# Patient Record
Sex: MALE | Race: OTHER | Employment: OTHER | URBAN - METROPOLITAN AREA
[De-identification: names, ages, dates, MRNs, and addresses within clinical notes are randomized per-mention and may not be internally consistent; named-entity substitution may affect disease eponyms.]

---

## 2017-01-16 ENCOUNTER — OFFICE VISIT (OUTPATIENT)
Dept: INTERNAL MEDICINE CLINIC | Age: 77
End: 2017-01-16

## 2017-01-16 VITALS
TEMPERATURE: 97.7 F | HEART RATE: 67 BPM | RESPIRATION RATE: 20 BRPM | HEIGHT: 72 IN | WEIGHT: 194 LBS | DIASTOLIC BLOOD PRESSURE: 56 MMHG | SYSTOLIC BLOOD PRESSURE: 98 MMHG | BODY MASS INDEX: 26.28 KG/M2 | OXYGEN SATURATION: 95 %

## 2017-01-16 DIAGNOSIS — D22.9 NUMEROUS MOLES: ICD-10-CM

## 2017-01-16 DIAGNOSIS — F41.9 ANXIETY: ICD-10-CM

## 2017-01-16 DIAGNOSIS — C64.9 RENAL CELL ADENOCARCINOMA, UNSPECIFIED LATERALITY (HCC): ICD-10-CM

## 2017-01-16 DIAGNOSIS — C61 PROSTATE CANCER (HCC): ICD-10-CM

## 2017-01-16 DIAGNOSIS — L71.9 ROSACEA: ICD-10-CM

## 2017-01-16 DIAGNOSIS — Z95.5 STENTED CORONARY ARTERY: ICD-10-CM

## 2017-01-16 DIAGNOSIS — Z90.5 H/O PARTIAL NEPHRECTOMY: ICD-10-CM

## 2017-01-16 DIAGNOSIS — I25.10 CORONARY ARTERY DISEASE INVOLVING NATIVE HEART WITHOUT ANGINA PECTORIS, UNSPECIFIED VESSEL OR LESION TYPE: Primary | ICD-10-CM

## 2017-01-16 RX ORDER — METRONIDAZOLE 10 MG/G
GEL TOPICAL DAILY
Qty: 45 G | Refills: 0 | Status: SHIPPED | OUTPATIENT
Start: 2017-01-16 | End: 2017-08-03 | Stop reason: ALTCHOICE

## 2017-01-16 RX ORDER — LORAZEPAM 1 MG/1
TABLET ORAL
Qty: 30 TAB | Refills: 1 | Status: SHIPPED | OUTPATIENT
Start: 2017-01-16 | End: 2017-11-07 | Stop reason: SDUPTHER

## 2017-01-16 NOTE — MR AVS SNAPSHOT
Visit Information Date & Time Provider Department Dept. Phone Encounter #  
 1/16/2017  1:15 PM [facility] Internal Medicine [phone] [id number] Follow-up Instructions Return in about 6 months (around 7/16/2017). Upcoming Health Maintenance Date Due DTaP/Tdap/Td series (1 - Tdap) 2/1/1962 ZOSTER VACCINE AGE 60> 2/1/2001 GLAUCOMA SCREENING Q2Y 2/1/2006 Pneumococcal 65+ High/Highest Risk (1 of 2 - PCV13) 2/1/2006 MEDICARE YEARLY EXAM 2/1/2006 INFLUENZA AGE 9 TO ADULT 8/1/2016 Allergies as of 1/16/2017  Review Complete On: 1/16/2017 By: Rizwana Saucedo,  Severity Noted Reaction Type Reactions Latex  04/21/2015    Other (comments) Blistering of the skin Current Immunizations  Reviewed on 6/25/2015 Name Date Pneumococcal Polysaccharide (PPSV-23)  Deferred (Patient Refused) Not reviewed this visit You Were Diagnosed With   
  
 Codes Comments Coronary artery disease involving native heart without angina pectoris, unspecified vessel or lesion type    -  Primary ICD-10-CM: I25.10 ICD-9-CM: 414.01 Stented coronary artery     ICD-10-CM: Z95.5 ICD-9-CM: V45.82 Renal cell adenocarcinoma, unspecified [facility]     ICD-10-CM: C64.9 ICD-9-CM: 189.0 Prostate cancer [facility])     ICD-10-CM: Z27 ICD-9-CM: 593 Anxiety     ICD-10-CM: F41.9 ICD-9-CM: 300.00 H/O partial nephrectomy     ICD-10-CM: Z90.5 ICD-9-CM: V15.29 Numerous moles     ICD-10-CM: D22.9 ICD-9-CM: 216.9 Rosacea     ICD-10-CM: L71.9 ICD-9-CM: 695.3 Vitals BP Pulse Temp Resp Height(growth percentile) Weight(growth percentile) 98/56 (BP 1 Location: Right arm, BP Patient Position: Sitting) 67 97.7 °F (36.5 °C) (Oral) 20 6' (1.829 m) 194 lb (88 kg) SpO2 BMI Smoking Status 95% 26.31 kg/m2 Current Every Day Smoker Vitals History BMI and BSA Data Body Mass Index Body Surface Area 26.31 kg/m 2 2.11 m 2 Preferred Pharmacy Pharmacy Name Phone [facility] [address] [facility], [address] [place] [address],[address] [phone] Your Updated Medication List  
  
   
This list is accurate as of: 1/16/17  1:38 PM.  Always use your most recent med list.  
  
  
  
  
 aspirin, buffered 81 mg Tab Take  by mouth. DULCOLAX (BISACODYL) 5 mg EC tablet Generic drug:  bisacodyl Take 5 mg by mouth daily. Indications: CONSTIPATION  
  
 lisinopril 5 mg tablet Commonly known as:  [place] Take 1 Tab by mouth daily. LORazepam 1 mg tablet Commonly known as:  ATIVAN  
1 daily prn anxiety  
  
 metroNIDAZOLE 1 % topical gel Commonly known as:  Rylee Larve Apply  to affected area daily. Use a thin layer to affected areas after washing  
  
 multivitamin tablet Commonly known as:  ONE A DAY Take 1 Tab by mouth daily. saw palmetto 160 mg Cap Take 1 Cap by mouth two (2) times a day. trimethoprim-sulfamethoxazole 160-800 mg per tablet Commonly known as:  BACTRIM DS, SEPTRA DS  
1 po on M,W,F for UTI prophylaxis VITAMIN B COMPLEX PO Take  by mouth daily. Prescriptions Sent to Pharmacy Refills  
 metroNIDAZOLE (METROGEL) 1 % topical gel 0 Sig: Apply  to affected area daily. Use a thin layer to affected areas after washing Class: Normal  
 Pharmacy: [address] [facility], [address] [place] [address],[facility] Floor Ph #: [phone] Route: Topical  
  
We Performed the Following CBC W/O DIFF [40798 CPT(R)] DESTRUC BENIGN LESION, UP TO 14 LESIONS [45467 CPT(R)] LIPID PANEL [35912 CPT(R)] METABOLIC PANEL, COMPREHENSIVE [66227 CPT(R)] Follow-up Instructions Return in about 6 months (around 7/16/2017). Please provide this summary of care documentation to your next provider. Your primary care clinician is listed as Aviva Gilmore.  If you have any questions after today's visit, please call [phone].

## 2017-01-16 NOTE — PROGRESS NOTES
HISTORY OF PRESENT ILLNESS  Faith Pardo is a 77 y.o. male. Patient comes in for f/u. Concerned about moles on his face. No significant changes. Also has rosacea. Has urine incontinence. Uses pads since having TURP for prostate cancer/BPH. No hematuria. Also had R partial nephrectomy for RCC. Has not seen  in a long time. Denies any symptoms. On prophylactic Bactrim. His cardiac status is stable. Has not seen cardiologist in a while either. Sees eye MD for macular degeneration. Medication list and most recent labs reviewed. Needs repeat labs. Needs refill of ativan prn. Skin Problem   Pertinent negatives include no chest pain, no abdominal pain, no headaches and no shortness of breath. Anxiety   Pertinent negatives include no chest pain, no abdominal pain, no headaches and no shortness of breath. Review of Systems   Constitutional: Negative. Eyes: Positive for blurred vision. Respiratory: Negative for shortness of breath. Cardiovascular: Negative for chest pain and leg swelling. Gastrointestinal: Negative for abdominal pain. Genitourinary: Positive for frequency and urgency. Negative for dysuria, flank pain and hematuria. Musculoskeletal: Negative for joint pain. Skin: Negative. Neurological: Negative for dizziness, sensory change and headaches. Psychiatric/Behavioral: Negative for depression. The patient is nervous/anxious. All other systems reviewed and are negative. Physical Exam   Constitutional: He is oriented to person, place, and time. He appears well-developed and well-nourished. No distress. pleasant   HENT:   Head: Normocephalic and atraumatic. Mouth/Throat: Oropharynx is clear and moist.   Eyes: Conjunctivae are normal.   Neck: Normal range of motion. Neck supple. Cardiovascular: Normal rate and regular rhythm. Pulmonary/Chest: Effort normal and breath sounds normal. No respiratory distress. He has no wheezes. He has no rales. Abdominal: Soft.  Bowel sounds are normal. He exhibits no distension. There is no tenderness. Musculoskeletal: He exhibits no tenderness. Neurological: He is alert and oriented to person, place, and time. Coordination normal.   Skin: Skin is warm and dry. No rash noted. There is erythema (nose and face c/w rosacea). Round brown mole on L temple  Small brown mole on R cheek  Both look benign   Psychiatric: He has a normal mood and affect. His behavior is normal.   Nursing note and vitals reviewed. ASSESSMENT and PLAN  Lev was seen today for skin problem, coronary artery disease and anxiety. Diagnoses and all orders for this visit:    Coronary artery disease involving native heart without angina pectoris, unspecified vessel or lesion type  -     CBC W/O DIFF  -     LIPID PANEL  -     METABOLIC PANEL, COMPREHENSIVE    Stented coronary artery    Renal cell adenocarcinoma, unspecified laterality (HCC)    Prostate cancer (HCC)    Anxiety    H/O partial nephrectomy    Numerous moles  -     DESTRUC BENIGN LESION, UP TO 14 LESIONS    Rosacea    Other orders  -     metroNIDAZOLE (METROGEL) 1 % topical gel; Apply  to affected area daily. Use a thin layer to affected areas after washing  -     LORazepam (ATIVAN) 1 mg tablet; 1 daily prn anxiety      Follow-up Disposition:  Return in about 6 months (around 7/16/2017).   reviewed diet, exercise and weight control  reviewed medications and side effects in detail  Reassurance  F/u with other MD's as scheduled

## 2017-01-16 NOTE — PROGRESS NOTES
Chief Complaint   Patient presents with    Skin Problem     mole left side of face, check nose and left hand     Reviewed record  In preparation for visit and have obtained necessary documentation. 1. Have you been to the ER, urgent care clinic since your last visit? Hospitalized since your last visit? [facility]--hernia    2. Have you seen or consulted any other health care providers outside of the [address] [place] [address] since your last visit? Include any pap smears or colon screening.   Dr. Malika Delarosa 2 patient I. D. 's

## 2017-04-04 RX ORDER — SULFAMETHOXAZOLE AND TRIMETHOPRIM 800; 160 MG/1; MG/1
TABLET ORAL
Qty: 15 TAB | Refills: 11 | Status: SHIPPED | OUTPATIENT
Start: 2017-04-04 | End: 2018-04-12 | Stop reason: SDUPTHER

## 2017-07-05 ENCOUNTER — TELEPHONE (OUTPATIENT)
Dept: INTERNAL MEDICINE CLINIC | Age: 77
End: 2017-07-05

## 2017-07-05 NOTE — TELEPHONE ENCOUNTER
Spoke to patient let him know he is due for a Medicare Wellness appt. Patient states he will check schedule and call back.

## 2017-08-03 ENCOUNTER — OFFICE VISIT (OUTPATIENT)
Dept: INTERNAL MEDICINE CLINIC | Age: 77
End: 2017-08-03

## 2017-08-03 VITALS
OXYGEN SATURATION: 98 % | SYSTOLIC BLOOD PRESSURE: 129 MMHG | TEMPERATURE: 96.9 F | DIASTOLIC BLOOD PRESSURE: 59 MMHG | HEART RATE: 43 BPM | BODY MASS INDEX: 26.55 KG/M2 | HEIGHT: 72 IN | WEIGHT: 196 LBS | RESPIRATION RATE: 18 BRPM

## 2017-08-03 DIAGNOSIS — J06.9 URTI (ACUTE UPPER RESPIRATORY INFECTION): Primary | ICD-10-CM

## 2017-08-03 DIAGNOSIS — C61 PROSTATE CANCER (HCC): ICD-10-CM

## 2017-08-03 RX ORDER — DOXYCYCLINE HYCLATE 100 MG
100 TABLET ORAL 2 TIMES DAILY
Qty: 14 TAB | Refills: 0 | Status: SHIPPED | OUTPATIENT
Start: 2017-08-03 | End: 2017-11-07 | Stop reason: ALTCHOICE

## 2017-08-03 NOTE — PROGRESS NOTES
Chief Complaint   Patient presents with    Cough     sx since late yesterday, productive, white phlegm    Back Pain     upper back     Health Maintenance Due   Topic Date Due    DTaP/Tdap/Td series (1 - Tdap) 02/01/1962    ZOSTER VACCINE AGE 60>  12/01/2000    GLAUCOMA SCREENING Q2Y  02/01/2006    Pneumococcal 65+ High/Highest Risk (1 of 2 - PCV13) 02/01/2006    MEDICARE YEARLY EXAM  02/01/2006    INFLUENZA AGE 9 TO ADULT  08/01/2017     Coordination of Care Questions    1. Have you been to the ER, urgent care clinic since your last visit? No       Hospitalized since your last visit? No    2. Have you seen or consulted any other health care providers outside of the [address] [place] [address] since your last visit? Include any pap smears or colon screening.  No

## 2017-08-03 NOTE — MR AVS SNAPSHOT
Visit Information Date & Time Provider Department Dept. Phone Encounter #  
 8/3/2017  2:30 PM Rajni Diaz NP [facility] Internal Medicine [phone] [id number] Upcoming Health Maintenance Date Due DTaP/Tdap/Td series (1 - Tdap) 2/1/1962 ZOSTER VACCINE AGE 60> 12/1/2000 GLAUCOMA SCREENING Q2Y 2/1/2006 Pneumococcal 65+ High/Highest Risk (1 of 2 - PCV13) 2/1/2006 MEDICARE YEARLY EXAM 2/1/2006 INFLUENZA AGE 9 TO ADULT 8/1/2017 Allergies as of 8/3/2017  Review Complete On: 8/3/2017 By: Rajni Diaz NP Severity Noted Reaction Type Reactions Latex  04/21/2015    Other (comments) Blistering of the skin Current Immunizations  Reviewed on 6/25/2015 Name Date Pneumococcal Polysaccharide (PPSV-23)  Deferred (Patient Refused) Not reviewed this visit You Were Diagnosed With   
  
 Codes Comments URTI (acute upper respiratory infection)    -  Primary ICD-10-CM: J06.9 ICD-9-CM: 465.9 Vitals BP Pulse Temp Resp Height(growth percentile) Weight(growth percentile) 129/59 (BP 1 Location: Right arm, BP Patient Position: Sitting) (!) 43 96.9 °F (36.1 °C) (Oral) 18 6' (1.829 m) 196 lb (88.9 kg) SpO2 BMI Smoking Status 98% 26.58 kg/m2 Current Every Day Smoker Vitals History BMI and BSA Data Body Mass Index Body Surface Area  
 26.58 kg/m 2 2.13 m 2 Preferred Pharmacy Pharmacy Name Phone [facility] [address] [place] [address], [address] [place] [address],[facility] Floor [phone] Your Updated Medication List  
  
   
This list is accurate as of: 8/3/17  2:38 PM.  Always use your most recent med list.  
  
  
  
  
 aspirin, buffered 81 mg Tab Take  by mouth. doxycycline 100 mg tablet Commonly known as:  VIBRA-TABS Take 1 Tab by mouth two (2) times a day. DULCOLAX (BISACODYL) 5 mg EC tablet Generic drug:  bisacodyl Take 5 mg by mouth daily.  Indications: CONSTIPATION  
 lisinopril 5 mg tablet Commonly known as:  Gabriella Fiatt Take 1 Tab by mouth daily. LORazepam 1 mg tablet Commonly known as:  ATIVAN  
1 daily prn anxiety  
  
 multivitamin tablet Commonly known as:  ONE A DAY Take 1 Tab by mouth daily. saw palmetto 160 mg Cap Take 1 Cap by mouth two (2) times a day. trimethoprim-sulfamethoxazole 160-800 mg per tablet Commonly known as:  BACTRIM DS, SEPTRA DS  
1 po on M,W,F for UTI prophylaxis Prescriptions Sent to Pharmacy Refills  
 doxycycline (VIBRA-TABS) 100 mg tablet 0 Sig: Take 1 Tab by mouth two (2) times a day. Class: Normal  
 Pharmacy: [address] [facility], [address] [place] [address],[address] Ph #: [phone] Route: Oral  
  
Introducing \[facility]\"" & HEALTH SERVICES! Dear Javier Backers: 
Thank you for requesting a [employer] account. Our records indicate that you have previously registered for a [employer] account but its currently inactive. Please call our [employer] support line at [phone]. Additional Information If you have questions, please visit the Frequently Asked Questions section of the [employer] website at [email or web address] Remember, [employer] is NOT to be used for urgent needs. For medical emergencies, dial 911. Now available from your iPhone and Android! Please provide this summary of care documentation to your next provider. Your primary care clinician is listed as Megan Domingo. If you have any questions after today's visit, please call [phone].

## 2017-08-03 NOTE — PROGRESS NOTES
Subjective:     Chief Complaint   Patient presents with    Cough     sx since late yesterday, productive, white phlegm    Back Pain     upper back       History of Present Illness    Matt Jacobson is a 69y.o. year old male who is a patient of Dr. Robert Villa that presents today with cough with sputum production and upper back pain related to coughing. Pt states the symptoms began last night. He denies and fever or CP. States for the past 2 days he has experienced some SOB while walking up the stairs in the parking deck at work. He is a smoker. The patient is worried that he has pneumonia. He states he took 2 ibuprofen today for the symptoms with moderate relief. His PMH includes CAD, MI, prostate CA, renal cell adenocarcinoma, anxiety, and urinary incontinence. He denies any other complaints today. Reviewed medications, recent lab work and imaging with patient. Pt reports compliance with medications. Current Outpatient Prescriptions on File Prior to Visit   Medication Sig Dispense Refill    trimethoprim-sulfamethoxazole (BACTRIM DS, SEPTRA DS) 160-800 mg per tablet 1 po on M,W,F for UTI prophylaxis 15 Tab 11    LORazepam (ATIVAN) 1 mg tablet 1 daily prn anxiety 30 Tab 1    Aspirin, Buffered 81 mg tab Take  by mouth.  bisacodyl (DULCOLAX, BISACODYL,) 5 mg EC tablet Take 5 mg by mouth daily. Indications: CONSTIPATION      lisinopril (PRINIVIL, ZESTRIL) 5 mg tablet Take 1 Tab by mouth daily. 90 Tab 1    saw palmetto 160 mg cap Take 1 Cap by mouth two (2) times a day.  multivitamin (ONE A DAY) tablet Take 1 Tab by mouth daily.  metroNIDAZOLE (METROGEL) 1 % topical gel Apply  to affected area daily. Use a thin layer to affected areas after washing (Patient not taking: Reported on 8/3/2017) 45 g 0    VITAMIN B COMPLEX PO Take  by mouth daily. No current facility-administered medications on file prior to visit.         Allergies   Allergen Reactions    Latex Other (comments) Blistering of the skin      Past Medical History:   Diagnosis Date    CAD (coronary artery disease)     MI 2004    Cancer [facility])     prostate s/p xrt    Macular degeneration     MI (myocardial infarction) ([facility] 2004      Past Surgical History:   Procedure Laterality Date    CARDIAC SURG PROCEDURE UNLIST      2 Stents    HX APPENDECTOMY  1955    HX CATARACT REMOVAL      bilateral    HX UROLOGICAL  6/24/15    RIGHT ROBOTIC PARTIAL NEPHRECTOMY  (LATEX ALLERGY), renal exploration, cystectomy x 3      Social History   Substance Use Topics    Smoking status: Current Every Day Smoker     Packs/day: 0.50     Years: 40.00    Smokeless tobacco: Never Used      Comment: 1 pack every 4 days    Alcohol use No      Family History   Problem Relation Age of Onset    Alzheimer Mother     Cancer Father      colon        Objective:     Vitals:    08/03/17 1422   BP: 129/59   Pulse: (!) 43   Resp: 18   Temp: 96.9 °F (36.1 °C)   TempSrc: Oral   SpO2: 98%   Weight: 196 lb (88.9 kg)   Height: 6' (1.829 m)       Review of Systems   Constitutional: Negative. HENT: Negative. Eyes: Negative. Respiratory: Positive for cough. Cardiovascular: Negative. Gastrointestinal: Negative. Genitourinary: Negative. Musculoskeletal: Positive for myalgias. Skin: Negative. Neurological: Negative. Psychiatric/Behavioral: Negative. Physical Exam   Constitutional: He is oriented to person, place, and time. He appears well-developed and well-nourished. HENT:   Head: Normocephalic and atraumatic. Eyes: EOM are normal. Pupils are equal, round, and reactive to light. Neck: Normal range of motion. Neck supple. Cardiovascular: Normal rate, regular rhythm and normal heart sounds. No murmur heard. Pulmonary/Chest: Effort normal. He has wheezes in the right upper field, the right middle field, the left upper field and the left middle field. Abdominal: Soft.  Bowel sounds are normal.   Musculoskeletal: Normal range of motion. He exhibits no tenderness or deformity. Neurological: He is alert and oriented to person, place, and time. Skin: Skin is warm. Psychiatric: His behavior is normal. His mood appears anxious. Nursing note and vitals reviewed. Assessment/ Plan:   Diagnoses and all orders for this visit:    1. URTI (acute upper respiratory infection)   Will order   -     doxycycline (VIBRA-TABS) 100 mg tablet; Take 1 Tab by mouth two (2) times a day. 2. Prostate cancer [facility])    Reviewed plan of care with patient. Patient verbalizes understanding. Patient without questions or concerns at this time. Patient encouraged to call or return to office if symptoms do not improve or worsen.

## 2017-08-03 NOTE — PATIENT INSTRUCTIONS

## 2017-11-07 ENCOUNTER — OFFICE VISIT (OUTPATIENT)
Dept: INTERNAL MEDICINE CLINIC | Age: 77
End: 2017-11-07

## 2017-11-07 VITALS
DIASTOLIC BLOOD PRESSURE: 57 MMHG | SYSTOLIC BLOOD PRESSURE: 128 MMHG | HEIGHT: 72 IN | WEIGHT: 198 LBS | BODY MASS INDEX: 26.82 KG/M2 | OXYGEN SATURATION: 97 % | HEART RATE: 41 BPM | RESPIRATION RATE: 18 BRPM

## 2017-11-07 DIAGNOSIS — F41.9 ANXIETY: ICD-10-CM

## 2017-11-07 DIAGNOSIS — I25.10 CORONARY ARTERY DISEASE INVOLVING NATIVE HEART WITHOUT ANGINA PECTORIS, UNSPECIFIED VESSEL OR LESION TYPE: ICD-10-CM

## 2017-11-07 DIAGNOSIS — Z90.5 H/O PARTIAL NEPHRECTOMY: ICD-10-CM

## 2017-11-07 DIAGNOSIS — H35.30 MACULAR DEGENERATION: ICD-10-CM

## 2017-11-07 DIAGNOSIS — Z00.00 MEDICARE ANNUAL WELLNESS VISIT, INITIAL: ICD-10-CM

## 2017-11-07 DIAGNOSIS — D22.39 MELANOCYTIC NEVUS OF FACE, OTHER LOCATION: Primary | ICD-10-CM

## 2017-11-07 DIAGNOSIS — Z90.79 S/P TURP: ICD-10-CM

## 2017-11-07 DIAGNOSIS — Z95.5 STENTED CORONARY ARTERY: ICD-10-CM

## 2017-11-07 DIAGNOSIS — L71.9 ROSACEA: ICD-10-CM

## 2017-11-07 RX ORDER — LORAZEPAM 1 MG/1
TABLET ORAL
Qty: 30 TAB | Refills: 2 | Status: SHIPPED | OUTPATIENT
Start: 2017-11-07 | End: 2019-01-04 | Stop reason: SDUPTHER

## 2017-11-07 NOTE — MR AVS SNAPSHOT
Visit Information Date & Time Provider Department Dept. Phone Encounter #  
 11/7/2017 10:00 AM Reynaldo Angel, [address] [facility] Internal Medicine [phone] [id number] Follow-up Instructions Return in about 6 months (around 5/7/2018). Upcoming Health Maintenance Date Due DTaP/Tdap/Td series (1 - Tdap) 2/1/1962 ZOSTER VACCINE AGE 60> 12/1/2000 GLAUCOMA SCREENING Q2Y 2/1/2006 Pneumococcal 65+ High/Highest Risk (1 of 2 - PCV13) 2/1/2006 MEDICARE YEARLY EXAM 2/1/2006 INFLUENZA AGE 9 TO ADULT 8/1/2017 Allergies as of 11/7/2017  Review Complete On: 11/7/2017 By: Reynaldo Angel, DO Severity Noted Reaction Type Reactions Latex  04/21/2015    Other (comments) Blistering of the skin Current Immunizations  Reviewed on 6/25/2015 Name Date Pneumococcal Polysaccharide (PPSV-23)  Deferred (Patient Refused) Not reviewed this visit You Were Diagnosed With   
  
 Codes Comments Melanocytic nevus of face, other location    -  Primary ICD-10-CM: D22.39 ICD-9-CM: 216.3 Coronary artery disease involving native heart without angina pectoris, unspecified vessel or lesion type     ICD-10-CM: I25.10 ICD-9-CM: 414.01 Stented coronary artery     ICD-10-CM: Z95.5 ICD-9-CM: V45.82 Anxiety     ICD-10-CM: F41.9 ICD-9-CM: 300.00 S/P TURP     ICD-10-CM: Z90.79 ICD-9-CM: V45.89 Macular degeneration     ICD-10-CM: H35.30 ICD-9-CM: 362.50 Rosacea     ICD-10-CM: L71.9 ICD-9-CM: 695.3 H/O partial nephrectomy     ICD-10-CM: Z90.5 ICD-9-CM: V15.29 Vitals BP Pulse Resp Height(growth percentile) Weight(growth percentile) SpO2  
 128/57 (BP 1 Location: Right arm, BP Patient Position: Sitting) (!) 41 18 6' (1.829 m) 198 lb (89.8 kg) 97% BMI Smoking Status 26.85 kg/m2 Current Every Day Smoker Vitals History BMI and BSA Data  Body Mass Index Body Surface Area  
 26.85 kg/m 2 2.14 m 2  
  
  
 Preferred Pharmacy Pharmacy Name Phone [facility] [address] [facility], [address] [place] [address],[address] [phone] Your Updated Medication List  
  
   
This list is accurate as of: 17 10:07 AM.  Always use your most recent med list.  
  
  
  
  
 aspirin, buffered 81 mg Tab Take  by mouth. DULCOLAX (BISACODYL) 5 mg EC tablet Generic drug:  bisacodyl Take 5 mg by mouth daily. Indications: CONSTIPATION  
  
 lisinopril 5 mg tablet Commonly known as:  Ronalee Ruffing Take 1 Tab by mouth daily. LORazepam 1 mg tablet Commonly known as:  ATIVAN  
1 daily prn anxiety  
  
 multivitamin tablet Commonly known as:  ONE A DAY Take 1 Tab by mouth daily. saw palmetto 160 mg Cap Take 1 Cap by mouth two (2) times a day. trimethoprim-sulfamethoxazole 160-800 mg per tablet Commonly known as:  BACTRIM DS, SEPTRA DS  
1 po on M,W,F for UTI prophylaxis Prescriptions Printed Refills LORazepam (ATIVAN) 1 mg tablet 2 Si daily prn anxiety Class: Print We Performed the Following CBC W/O DIFF [19935 CPT(R)] LIPID PANEL [52718 CPT(R)] METABOLIC PANEL, COMPREHENSIVE [77876 CPT(R)] Follow-up Instructions Return in about 6 months (around 2018). Introducing [facility] & HEALTH SERVICES! Maxine Snowden introduces [employer] patient portal. Now you can access parts of your medical record, email your doctor's office, and request medication refills online. 1. In your internet browser, go to [email or web address] 2. Click on the First Time User? Click Here link in the Sign In box. You will see the New Member Sign Up page. 3. Enter your [employer] Access Code exactly as it appears below. You will not need to use this code after youve completed the sign-up process. If you do not sign up before the expiration date, you must request a new code.  
 
· [employer] Access Code: [email or web address] 
 Expires: 2/5/2018  9:35 AM 
 
4. Enter the last four digits of your Social Security Number (xxxx) and Date of Birth (mm/dd/yyyy) as indicated and click Submit. You will be taken to the next sign-up page. 5. Create a [employer] ID. This will be your [employer] login ID and cannot be changed, so think of one that is secure and easy to remember. 6. Create a [employer] password. You can change your password at any time. 7. Enter your Password Reset Question and Answer. This can be used at a later time if you forget your password. 8. Enter your e-mail address. You will receive e-mail notification when new information is available in [address] 9. Click Sign Up. You can now view and download portions of your medical record. 10. Click the Download Summary menu link to download a portable copy of your medical information. If you have questions, please visit the Frequently Asked Questions section of the [employer] website. Remember, [employer] is NOT to be used for urgent needs. For medical emergencies, dial 911. Now available from your iPhone and Android! Please provide this summary of care documentation to your next provider. Your primary care clinician is listed as Lisa Dodson. If you have any questions after today's visit, please call [phone].

## 2017-11-07 NOTE — PROGRESS NOTES
Nico Figueroa is a 69 y.o. male and presents for annual Medicare Wellness Visit. Problem List: Reviewed with patient and discussed risk factors. Patient Active Problem List   Diagnosis Code    CAD (coronary artery disease) I25.10    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    Prostate cancer ([facility] [address] C61    Macular degeneration H35.30    Stented coronary artery Z95.5    Anxiety F41.9    Renal cell adenocarcinoma (HCC) C64.9    Renal mass N28.89    S/P TURP Z90.79    Other urinary incontinence N39.498    H/O partial nephrectomy Z90.5    Rosacea L71.9       Current medical providers:  Patient Care Team:  Dante Xavier DO as PCP - General (Internal Medicine)  Raul Garber LPN as Ambulatory Care Navigator    PSH: Reviewed with patient  Past Surgical History:   Procedure Laterality Date    CARDIAC SURG PROCEDURE UNLIST      2 Stents    HX APPENDECTOMY  1955    HX CATARACT REMOVAL      bilateral    HX UROLOGICAL  6/24/15    RIGHT ROBOTIC PARTIAL NEPHRECTOMY  (LATEX ALLERGY), renal exploration, cystectomy x 3        SH: Reviewed with patient  Social History   Substance Use Topics    Smoking status: Current Every Day Smoker     Packs/day: 0.50     Years: 40.00    Smokeless tobacco: Never Used      Comment: 1 pack every 4 days    Alcohol use No       FH: Reviewed with patient  Family History   Problem Relation Age of Onset    Alzheimer Mother     Cancer Father      colon       Medications/Allergies: Reviewed with patient  Current Outpatient Prescriptions on File Prior to Visit   Medication Sig Dispense Refill    LORazepam (ATIVAN) 1 mg tablet 1 daily prn anxiety 30 Tab 1    Aspirin, Buffered 81 mg tab Take  by mouth.  bisacodyl (DULCOLAX, BISACODYL,) 5 mg EC tablet Take 5 mg by mouth daily. Indications: CONSTIPATION      lisinopril (PRINIVIL, ZESTRIL) 5 mg tablet Take 1 Tab by mouth daily. 90 Tab 1    saw palmetto 160 mg cap Take 1 Cap by mouth two (2) times a day.       multivitamin (ONE A DAY) tablet Take 1 Tab by mouth daily.  doxycycline (VIBRA-TABS) 100 mg tablet Take 1 Tab by mouth two (2) times a day. 14 Tab 0    trimethoprim-sulfamethoxazole (BACTRIM DS, SEPTRA DS) 160-800 mg per tablet 1 po on M,W,F for UTI prophylaxis 15 Tab 11     No current facility-administered medications on file prior to visit. Allergies   Allergen Reactions    Latex Other (comments)     Blistering of the skin       Objective:  Visit Vitals    /57 (BP 1 Location: Right arm, BP Patient Position: Sitting)    Pulse (!) 41    Resp 18    Ht 6' (1.829 m)    Wt 198 lb (89.8 kg)    SpO2 97%    BMI 26.85 kg/m2    Body mass index is 26.85 kg/(m^2). Assessment of cognitive impairment: Alert and oriented x 3    Depression Screen:   PHQ over the last two weeks 11/7/2017   Little interest or pleasure in doing things Not at all   Feeling down, depressed or hopeless Not at all   Total Score PHQ 2 0       Fall Risk Assessment:    Fall Risk Assessment, last 12 mths 11/7/2017   Able to walk? Yes   Fall in past 12 months? No       Functional Ability:   Does the patient exhibit a steady gait? yes   How long did it take the patient to get up and walk from a sitting position? 10     Is the patient self reliant?  (ie can do own laundry, meals, household chores)  yes     Does the patient handle his/her own medications? yes     Does the patient handle his/her own money? yes     Is the patients home safe (ie good lighting, handrails on stairs and bath, etc.)? yes     Did you notice or did patient express any hearing difficulties? no     Did you notice or did patient express any vision difficulties?   no     Were distance and reading eye charts used? no       Advance Care Planning:   Patient was offered the opportunity to discuss advance care planning:  yes     Does patient have an Advance Directive:  yes   If no, did you provide information on Caring Connections?   yes       Plan:      No orders of the defined types were placed in this encounter. Health Maintenance   Topic Date Due    DTaP/Tdap/Td series (1 - Tdap) 02/01/1962    ZOSTER VACCINE AGE 60>  12/01/2000    GLAUCOMA SCREENING Q2Y  02/01/2006    Pneumococcal 65+ High/Highest Risk (1 of 2 - PCV13) 02/01/2006    MEDICARE YEARLY EXAM  02/01/2006    INFLUENZA AGE 9 TO ADULT  08/01/2017       *Patient verbalized understanding and agreement with the plan. A copy of the After Visit Summary with personalized health plan was given to the patient today.

## 2017-11-07 NOTE — PROGRESS NOTES
HISTORY OF PRESENT ILLNESS  Nela Rubin is a 69 y.o. male.   HPI    ROS    Physical Exam    ASSESSMENT and PLAN  {ASSESSMENT/PLAN:10888}

## 2017-11-07 NOTE — PROGRESS NOTES
Health Maintenance Due   Topic Date Due    DTaP/Tdap/Td series (1 - Tdap) 02/01/1962    ZOSTER VACCINE AGE 60>  12/01/2000    GLAUCOMA SCREENING Q2Y  02/01/2006    Pneumococcal 65+ High/Highest Risk (1 of 2 - PCV13) 02/01/2006    MEDICARE YEARLY EXAM  02/01/2006    INFLUENZA AGE 9 TO ADULT  08/01/2017       Chief Complaint   Patient presents with    Mole     eyebrow    Coronary Artery Disease       1. Have you been to the ER, urgent care clinic since your last visit? Hospitalized since your last visit? No    2. Have you seen or consulted any other health care providers outside of the [address] [place] [address] since your last visit? Include any pap smears or colon screening. No    3) Do you have an Advance Directive on file? no    4) Are you interested in receiving information on Advance Directives? NO      Patient is accompanied by self I have received verbal consent from [address] [place] [address] to discuss any/all medical information while they are present in the room. [address] [place] [address] Leonard is a 69 y.o. male and presents for annual Medicare Wellness Visit. Problem List: Reviewed with patient and discussed risk factors.     Patient Active Problem List   Diagnosis Code    CAD (coronary artery disease) I25.10    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    Prostate cancer ([facility] [address] C61    Macular degeneration H35.30    Stented coronary artery Z95.5    Anxiety F41.9    Renal cell adenocarcinoma ([facility] [address] C64.9    Renal mass N28.89    S/P TURP Z90.79    Other urinary incontinence N39.498    H/O partial nephrectomy Z90.5    Rosacea L71.9       Current medical providers:  Patient Care Team:  Clydie Sever, DO as PCP - General (Internal Medicine)  Xochitl Garcia LPN as Ambulatory Care Navigator    PSH: Reviewed with patient  Past Surgical History:   Procedure Laterality Date    CARDIAC SURG PROCEDURE UNLIST      2 Stents    HX APPENDECTOMY  1955    HX CATARACT REMOVAL      bilateral    HX UROLOGICAL  6/24/15 RIGHT ROBOTIC PARTIAL NEPHRECTOMY  (LATEX ALLERGY), renal exploration, cystectomy x 3        SH: Reviewed with patient  Social History   Substance Use Topics    Smoking status: Current Every Day Smoker     Packs/day: 0.50     Years: 40.00    Smokeless tobacco: Never Used      Comment: 1 pack every 4 days    Alcohol use No       FH: Reviewed with patient  Family History   Problem Relation Age of Onset    Alzheimer Mother     Cancer Father      colon       Medications/Allergies: Reviewed with patient  Current Outpatient Prescriptions on File Prior to Visit   Medication Sig Dispense Refill    LORazepam (ATIVAN) 1 mg tablet 1 daily prn anxiety 30 Tab 1    Aspirin, Buffered 81 mg tab Take  by mouth.  bisacodyl (DULCOLAX, BISACODYL,) 5 mg EC tablet Take 5 mg by mouth daily. Indications: CONSTIPATION      lisinopril (PRINIVIL, ZESTRIL) 5 mg tablet Take 1 Tab by mouth daily. 90 Tab 1    saw palmetto 160 mg cap Take 1 Cap by mouth two (2) times a day.  multivitamin (ONE A DAY) tablet Take 1 Tab by mouth daily.  doxycycline (VIBRA-TABS) 100 mg tablet Take 1 Tab by mouth two (2) times a day. 14 Tab 0    trimethoprim-sulfamethoxazole (BACTRIM DS, SEPTRA DS) 160-800 mg per tablet 1 po on M,W,F for UTI prophylaxis 15 Tab 11     No current facility-administered medications on file prior to visit. Allergies   Allergen Reactions    Latex Other (comments)     Blistering of the skin       Objective:  Visit Vitals    /57 (BP 1 Location: Right arm, BP Patient Position: Sitting)    Pulse (!) 41    Resp 18    Ht 6' (1.829 m)    Wt 198 lb (89.8 kg)    SpO2 97%    BMI 26.85 kg/m2    Body mass index is 26.85 kg/(m^2).     Assessment of cognitive impairment: Alert and oriented x 3      Depression Screen:   PHQ over the last two weeks 11/7/2017   Little interest or pleasure in doing things Not at all   Feeling down, depressed or hopeless Not at all   Total Score PHQ 2 0       Fall Risk Assessment: Fall Risk Assessment, last 12 mths 11/7/2017   Able to walk? Yes   Fall in past 12 months? No       Functional Ability:   Does the patient exhibit a steady gait? yes   How long did it take the patient to get up and walk from a sitting position? 2 sec   Is the patient self reliant?  (ie can do own laundry, meals, household chores)  yes     Does the patient handle his/her own medications? yes     Does the patient handle his/her own money? yes     Is the patients home safe (ie good lighting, handrails on stairs and bath, etc.)? yes     Did you notice or did patient express any hearing difficulties? no     Did you notice or did patient express any vision difficulties? yes     Were distance and reading eye charts used? no       Advance Care Planning:   Patient was offered the opportunity to discuss advance care planning:  yes     Does patient have an Advance Directive:  no   If no, did you provide information on Caring Connections? yes       Plan:      No orders of the defined types were placed in this encounter. Health Maintenance   Topic Date Due    DTaP/Tdap/Td series (1 - Tdap) 02/01/1962    ZOSTER VACCINE AGE 60>  12/01/2000    GLAUCOMA SCREENING Q2Y  02/01/2006    Pneumococcal 65+ High/Highest Risk (1 of 2 - PCV13) 02/01/2006    MEDICARE YEARLY EXAM  02/01/2006    INFLUENZA AGE 9 TO ADULT  08/01/2017       *Patient verbalized understanding and agreement with the plan. A copy of the After Visit Summary with personalized health plan was given to the patient today.

## 2017-11-07 NOTE — PROGRESS NOTES
HISTORY OF PRESENT ILLNESS  Eloy Joseph is a 69 y.o. male. Pt. comes in for f/u. Has multiple medical problems. His cardiac status has been stable. Has not seen his cardiologist in a while. Denies any chest pain or respiratory issues. Concerned about some moles on his face and hand. No significant changes. Reports anxiety and depression. Uses lorazepam as needed which is very helpful. Working as a  which is stressful. History of prostate and kidney cancer which have been stable. Followed by urologist.  Reports compliance with medications and diet. Med list and most recent labs/studies reviewed with pt. Trying to be active physically to control weight. Needs med refills. Due for repeat labs. Smokes daily. Denies alcohol use. Lives with his wife. Has 3 grown children. Reports no other new c/o. Mole   Pertinent negatives include no chest pain, no abdominal pain, no headaches and no shortness of breath. Review of Systems   Constitutional: Negative. HENT: Negative. Eyes: Positive for blurred vision. Respiratory: Negative for shortness of breath. Cardiovascular: Negative for chest pain and leg swelling. Gastrointestinal: Negative for abdominal pain. Genitourinary: Positive for urgency. Negative for dysuria. Musculoskeletal: Negative for joint pain. Skin: Negative. Neurological: Negative for dizziness, sensory change, focal weakness and headaches. Psychiatric/Behavioral: Negative for depression. The patient is nervous/anxious. All other systems reviewed and are negative. Physical Exam   Constitutional: He is oriented to person, place, and time. He appears well-developed and well-nourished. No distress. pleasant   HENT:   Head: Normocephalic and atraumatic. Mouth/Throat: Oropharynx is clear and moist.   Eyes: Conjunctivae are normal.   Neck: Normal range of motion. Neck supple. No JVD present. No thyromegaly present.    Cardiovascular: Normal rate and regular rhythm. Pulmonary/Chest: Effort normal and breath sounds normal. No respiratory distress. He has no wheezes. He has no rales. Abdominal: Soft. Bowel sounds are normal. He exhibits no distension. Musculoskeletal: He exhibits no tenderness. Neurological: He is alert and oriented to person, place, and time. Coordination normal.   Skin: Skin is warm and dry. No rash noted. There is erythema (nose and face c/w rosacea). Round brown mole on L temple  Small mole on left hand   Psychiatric: He has a normal mood and affect. His behavior is normal.   Nursing note and vitals reviewed. ASSESSMENT and PLAN  Diagnoses and all orders for this visit:    1. Melanocytic nevus of face, other location  I froze the mole on his temple and left hand without any difficulty, he tolerated this well    2. Coronary artery disease involving native heart without angina pectoris, unspecified vessel or lesion type  -     METABOLIC PANEL, COMPREHENSIVE  -     LIPID PANEL  -     CBC W/O DIFF    3. Stented coronary artery    4. Anxiety    5. S/P TURP    6. Macular degeneration    7. Rosacea    8. H/O partial nephrectomy    Other orders  -     LORazepam (ATIVAN) 1 mg tablet; 1 daily prn anxiety      Follow-up Disposition:  Return in about 6 months (around 5/7/2018).    lab results and schedule of future lab studies reviewed with patient  reviewed diet, exercise and weight control  reviewed medications and side effects in detail  F/u with other MD's as scheduled  Overall stable

## 2018-04-12 RX ORDER — SULFAMETHOXAZOLE AND TRIMETHOPRIM 800; 160 MG/1; MG/1
TABLET ORAL
Qty: 15 TAB | Refills: 11 | Status: SHIPPED | OUTPATIENT
Start: 2018-04-12 | End: 2019-04-20 | Stop reason: SDUPTHER

## 2018-07-25 ENCOUNTER — HOSPITAL ENCOUNTER (OUTPATIENT)
Dept: CT IMAGING | Age: 78
Discharge: HOME | End: 2018-07-25
Attending: UROLOGY
Payer: MEDICARE

## 2018-07-25 DIAGNOSIS — C64.9 RENAL CELL CARCINOMA (HCC): ICD-10-CM

## 2018-07-25 PROCEDURE — 74011000258 HC RX REV CODE- 258: Performed by: UROLOGY

## 2018-07-25 PROCEDURE — 74011636320 HC RX REV CODE- 636/320: Performed by: UROLOGY

## 2018-07-25 PROCEDURE — 74170 CT ABD WO CNTRST FLWD CNTRST: CPT

## 2018-07-25 PROCEDURE — 82565 ASSAY OF CREATININE: CPT

## 2018-07-25 RX ORDER — SODIUM CHLORIDE 0.9 % (FLUSH) 0.9 %
10 SYRINGE (ML) INJECTION
Status: COMPLETED | OUTPATIENT
Start: 2018-07-25 | End: 2018-07-25

## 2018-07-25 RX ORDER — SODIUM CHLORIDE 9 MG/ML
50 INJECTION, SOLUTION INTRAVENOUS
Status: COMPLETED | OUTPATIENT
Start: 2018-07-25 | End: 2018-07-25

## 2018-07-25 RX ADMIN — Medication 10 ML: at 15:39

## 2018-07-25 RX ADMIN — IOPAMIDOL 100 ML: 755 INJECTION, SOLUTION INTRAVENOUS at 15:39

## 2018-07-25 RX ADMIN — SODIUM CHLORIDE 50 ML/HR: 900 INJECTION, SOLUTION INTRAVENOUS at 15:39

## 2018-07-26 LAB — CREAT BLD-MCNC: 0.9 MG/DL (ref 0.6–1.3)

## 2018-08-10 LAB — PSA, EXTERNAL: 13.37

## 2018-08-15 ENCOUNTER — HOSPITAL ENCOUNTER (OUTPATIENT)
Dept: NUCLEAR MEDICINE | Age: 78
Discharge: HOME | End: 2018-08-15
Attending: UROLOGY
Payer: MEDICARE

## 2018-08-15 DIAGNOSIS — C61 MALIGNANT NEOPLASM OF PROSTATE (HCC): ICD-10-CM

## 2018-08-15 PROCEDURE — A9503 TC99M MEDRONATE: HCPCS

## 2018-08-18 ENCOUNTER — HOSPITAL ENCOUNTER (EMERGENCY)
Age: 78
Discharge: HOME | End: 2018-08-18
Attending: EMERGENCY MEDICINE
Payer: MEDICARE

## 2018-08-18 VITALS
BODY MASS INDEX: 25.65 KG/M2 | HEART RATE: 79 BPM | WEIGHT: 189.38 LBS | RESPIRATION RATE: 18 BRPM | TEMPERATURE: 97.6 F | SYSTOLIC BLOOD PRESSURE: 138 MMHG | OXYGEN SATURATION: 96 % | DIASTOLIC BLOOD PRESSURE: 88 MMHG | HEIGHT: 72 IN

## 2018-08-18 DIAGNOSIS — R33.9 URINARY RETENTION: Primary | ICD-10-CM

## 2018-08-18 LAB
ANION GAP SERPL CALC-SCNC: 6 MMOL/L (ref 5–15)
BUN SERPL-MCNC: 21 MG/DL (ref 6–20)
BUN/CREAT SERPL: 18 (ref 12–20)
CALCIUM SERPL-MCNC: 9.8 MG/DL (ref 8.5–10.1)
CHLORIDE SERPL-SCNC: 108 MMOL/L (ref 97–108)
CO2 SERPL-SCNC: 26 MMOL/L (ref 21–32)
COMMENT, HOLDF: NORMAL
CREAT SERPL-MCNC: 1.19 MG/DL (ref 0.7–1.3)
GLUCOSE SERPL-MCNC: 107 MG/DL (ref 65–100)
POTASSIUM SERPL-SCNC: 5 MMOL/L (ref 3.5–5.1)
SAMPLES BEING HELD,HOLD: NORMAL
SODIUM SERPL-SCNC: 140 MMOL/L (ref 136–145)

## 2018-08-18 PROCEDURE — 99282 EMERGENCY DEPT VISIT SF MDM: CPT

## 2018-08-18 PROCEDURE — 80048 BASIC METABOLIC PNL TOTAL CA: CPT | Performed by: PHYSICIAN ASSISTANT

## 2018-08-18 PROCEDURE — 51703 INSERT BLADDER CATH COMPLEX: CPT

## 2018-08-18 PROCEDURE — 36415 COLL VENOUS BLD VENIPUNCTURE: CPT | Performed by: PHYSICIAN ASSISTANT

## 2018-08-18 PROCEDURE — 74011000250 HC RX REV CODE- 250: Performed by: EMERGENCY MEDICINE

## 2018-08-18 PROCEDURE — 77030034850

## 2018-08-18 PROCEDURE — 51798 US URINE CAPACITY MEASURE: CPT

## 2018-08-18 PROCEDURE — 77030036654 HC CATH URETH FOL COUDE BARD -B

## 2018-08-18 PROCEDURE — 77030029179 HC BAG URIN DRNG SIMS -A

## 2018-08-18 RX ORDER — LIDOCAINE HYDROCHLORIDE 20 MG/ML
JELLY TOPICAL
Status: COMPLETED | OUTPATIENT
Start: 2018-08-18 | End: 2018-08-18

## 2018-08-18 RX ORDER — TAMSULOSIN HYDROCHLORIDE 0.4 MG/1
0.4 CAPSULE ORAL DAILY
Qty: 15 CAP | Refills: 0 | Status: SHIPPED | OUTPATIENT
Start: 2018-08-18 | End: 2018-09-02

## 2018-08-18 RX ADMIN — LIDOCAINE HYDROCHLORIDE: 20 JELLY TOPICAL at 17:45

## 2018-08-18 NOTE — ED PROVIDER NOTES
HPI Comments: 69 y.o. male with past medical history significant for CAD, MI, macular degeneration, prostate CA who presents via private vehicle with chief complaint of urinary retention. Pt c/o difficulty urinating since last night. Pt reports that he has the urge to urinate, but when he tries only a few drops comes out. Pt reports hx of similar sx a few weeks ago. Pt was seen in the ED and had a catheter placed for 10 days, which resolved the sx. Pt was told that he had an enlarged prostate at that time. Pt spoke to the doctor on call at [facility] and was sent to the ED. Pt denies pain, but endorses the urge to urinate. Last nl urine output was last night. Pt denies new medication. There are no other acute medical concerns at this time. Social hx: +tobacco smoker, denies EtOH consumption    PCP: Chase Mckinley DO    Note written by Donaldo Singh, as dictated by Rachel Vu MD 4:58 PM      The history is provided by the patient. No  was used. Past Medical History:   Diagnosis Date    CAD (coronary artery disease)     MI 2004    Cancer [facility])     prostate s/p xrt    Macular degeneration     MI (myocardial infarction) ([facility] [address] 2004       Past Surgical History:   Procedure Laterality Date    CARDIAC SURG PROCEDURE UNLIST      2 Stents    HX APPENDECTOMY  1955    HX CATARACT REMOVAL      bilateral    HX UROLOGICAL  6/24/15    RIGHT ROBOTIC PARTIAL NEPHRECTOMY  (LATEX ALLERGY), renal exploration, cystectomy x 3         Family History:   Problem Relation Age of Onset    Alzheimer Mother     Cancer Father      colon       Social History     Social History    Marital status:      Spouse name: N/A    Number of children: N/A    Years of education: N/A     Occupational History    Not on file.      Social History Main Topics    Smoking status: Current Every Day Smoker     Packs/day: 0.50     Years: 40.00    Smokeless tobacco: Never Used      Comment: 1 pack every 4 days    Alcohol use No    Drug use: No    Sexual activity: Not on file     Other Topics Concern    Not on file     Social History Narrative         ALLERGIES: Latex    Review of Systems   Constitutional: Negative for activity change, appetite change and fatigue. HENT: Negative for ear pain, facial swelling, sore throat and trouble swallowing. Eyes: Negative for pain, discharge and visual disturbance. Respiratory: Negative for chest tightness, shortness of breath and wheezing. Cardiovascular: Negative for chest pain and palpitations. Gastrointestinal: Negative for abdominal pain, blood in stool, nausea and vomiting. Genitourinary: Positive for decreased urine volume, difficulty urinating and urgency. Negative for flank pain and hematuria. Musculoskeletal: Negative for arthralgias, joint swelling, myalgias and neck pain. Skin: Negative for color change and rash. Neurological: Negative for dizziness, weakness, numbness and headaches. Hematological: Negative for adenopathy. Does not bruise/bleed easily. Psychiatric/Behavioral: Negative for behavioral problems, confusion and sleep disturbance. All other systems reviewed and are negative. Vitals:    08/18/18 1651   BP: 138/88   Pulse: 79   Resp: 18   Temp: 97.6 °F (36.4 °C)   SpO2: 96%   Weight: 85.9 kg (189 lb 6 oz)   Height: 6' (1.829 m)            Physical Exam   Constitutional: He is oriented to person, place, and time. He appears well-developed and well-nourished. He appears distressed (Mild with urinary retention). HENT:   Head: Normocephalic and atraumatic. Nose: Nose normal.   Mouth/Throat: Oropharynx is clear and moist.   Eyes: Conjunctivae and EOM are normal. Pupils are equal, round, and reactive to light. No scleral icterus. Neck: Normal range of motion. Neck supple. No JVD present. No tracheal deviation present. No thyromegaly present. No carotid bruits noted.    Cardiovascular: Normal rate, regular rhythm, normal heart sounds and intact distal pulses. Exam reveals no gallop and no friction rub. No murmur heard. Pulmonary/Chest: Effort normal and breath sounds normal. No respiratory distress. He has no wheezes. He has no rales. He exhibits no tenderness. Abdominal: Soft. Bowel sounds are normal. He exhibits no distension and no mass. There is no tenderness. There is no rebound and no guarding. Mild bladder fullness palpable. Musculoskeletal: Normal range of motion. He exhibits no edema or tenderness. Lymphadenopathy:     He has no cervical adenopathy. Neurological: He is alert and oriented to person, place, and time. He has normal reflexes. No cranial nerve deficit. Coordination normal.   Skin: Skin is warm and dry. No rash noted. No erythema. Psychiatric: He has a normal mood and affect. His behavior is normal. Judgment and thought content normal.   Nursing note and vitals reviewed.    Note written by Donaldo Flood, as dictated by Magan Faria MD 5:19 PM        MDM  Number of Diagnoses or Management Options  Urinary retention: established and worsening     Amount and/or Complexity of Data Reviewed  Decide to obtain previous medical records or to obtain history from someone other than the patient: yes  Review and summarize past medical records: yes    Risk of Complications, Morbidity, and/or Mortality  Presenting problems: moderate  Diagnostic procedures: minimal  Management options: moderate    Patient Progress  Patient progress: stable        ED Course       Bladder Catheterization  Date/Time: 8/18/2018 6:55 PM  Performed by: Viry Childs  Authorized by: Viry Childs     Consent:     Consent obtained:  Verbal    Consent given by:  Patient    Risks discussed:  Pain    Alternatives discussed:  No treatment  Pre-procedure details:     Procedure purpose:  Therapeutic    Preparation: Patient was prepped and draped in usual sterile fashion    Anesthesia (see MAR for exact dosages): Anesthesia method:  Topical application    Topical anesthetic:  Lidocaine gel  Procedure details:     Provider performed due to:  Complicated insertion    Catheter insertion:  Temporary indwelling    Catheter type:  Coude    Catheter size:  14 Fr    Bladder irrigation: no      Number of attempts:  1    Urine characteristics:  Clear  Post-procedure details:     Patient tolerance of procedure: Tolerated well, no immediate complications      The patient tolerated the procedure well. Discharged with leg bag and will follow up with own MD for continued difficulty and recheck/removal of the catheter in 7-10 days.

## 2018-08-18 NOTE — ED TRIAGE NOTES
Pt complains of urinary retention, states the last time he urinated was last night. States he feels the urge but only makes drops, states he feels like his bladder is full. Hx of enlarged prostate. Also complains of lower mid abd pain.

## 2018-08-18 NOTE — ED NOTES
4:50 PM  I have evaluated the patient as the Provider in Triage. I have reviewed His vital signs and the triage nurse assessment. I have talked with the patient and any available family and advised that I am the provider in triage and have ordered the appropriate study to initiate their work up based on the clinical presentation during my assessment. I have advised that the patient will be accommodated in the Main ED as soon as possible. I have also requested to contact the triage nurse or myself immediately if the patient experiences any changes in their condition during this brief waiting period. Hx BPH. Difficulty urinating since last night, only passing \"drops. \"    TEDDY Kumari

## 2018-08-18 NOTE — Clinical Note
Leave the catheter in place and follow up with Dr. Michael Montoya in the office in the next 7-10 days for removal and further evaluation.

## 2018-09-13 ENCOUNTER — HOSPITAL ENCOUNTER (EMERGENCY)
Age: 78
Discharge: HOME | End: 2018-09-13
Attending: EMERGENCY MEDICINE
Payer: MEDICARE

## 2018-09-13 VITALS
OXYGEN SATURATION: 99 % | TEMPERATURE: 98.1 F | HEIGHT: 72 IN | HEART RATE: 86 BPM | SYSTOLIC BLOOD PRESSURE: 151 MMHG | DIASTOLIC BLOOD PRESSURE: 87 MMHG | WEIGHT: 189.2 LBS | BODY MASS INDEX: 25.63 KG/M2 | RESPIRATION RATE: 18 BRPM

## 2018-09-13 DIAGNOSIS — R33.9 URINARY RETENTION: Primary | ICD-10-CM

## 2018-09-13 LAB
APPEARANCE UR: CLEAR
BACTERIA URNS QL MICRO: NEGATIVE /HPF
BILIRUB UR QL: NEGATIVE
COLOR UR: ABNORMAL
EPITH CASTS URNS QL MICRO: ABNORMAL /LPF
GLUCOSE UR STRIP.AUTO-MCNC: NEGATIVE MG/DL
HGB UR QL STRIP: ABNORMAL
HYALINE CASTS URNS QL MICRO: ABNORMAL /LPF (ref 0–5)
KETONES UR QL STRIP.AUTO: NEGATIVE MG/DL
LEUKOCYTE ESTERASE UR QL STRIP.AUTO: NEGATIVE
NITRITE UR QL STRIP.AUTO: NEGATIVE
PH UR STRIP: 5.5 [PH] (ref 5–8)
PROT UR STRIP-MCNC: NEGATIVE MG/DL
RBC #/AREA URNS HPF: ABNORMAL /HPF (ref 0–5)
SP GR UR REFRACTOMETRY: 1.02 (ref 1–1.03)
UR CULT HOLD, URHOLD: NORMAL
UROBILINOGEN UR QL STRIP.AUTO: 0.2 EU/DL (ref 0.2–1)
WBC URNS QL MICRO: ABNORMAL /HPF (ref 0–4)

## 2018-09-13 PROCEDURE — 77030005518 HC CATH URETH FOL 2W BARD -B

## 2018-09-13 PROCEDURE — 51702 INSERT TEMP BLADDER CATH: CPT

## 2018-09-13 PROCEDURE — 99282 EMERGENCY DEPT VISIT SF MDM: CPT

## 2018-09-13 PROCEDURE — 81001 URINALYSIS AUTO W/SCOPE: CPT | Performed by: PHYSICIAN ASSISTANT

## 2018-09-13 NOTE — ED TRIAGE NOTES
Pt complains of urinary retention. States last urination was this am.  Hx of same. Denies abd pain, fever, n/v but states having \"pain in the bladder\".

## 2018-09-14 NOTE — DISCHARGE INSTRUCTIONS
Urinary Retention: Care Instructions  Your Care Instructions    Urinary retention means that you aren't able to urinate. In men, it is often caused by a blockage of the urinary tract from an enlarged prostate gland. In men and women, it can also be caused by an infection or nerve damage. Or it may be a side effect of a medicine. The doctor may have drained the urine from your bladder. If you still have problems passing urine, you may need to use a catheter at home. This is used to empty your bladder until the problem can be fixed. Your doctor may put a catheter in your bladder before you go home. If so, he or she will tell you when to come back to have the catheter removed. The doctor has checked you closely. But problems can develop later. If you notice any problems or new symptoms, get medical treatment right away. Follow-up care is a key part of your treatment and safety. Be sure to make and go to all appointments, and call your doctor if you are having problems. It's also a good idea to know your test results and keep a list of the medicines you take. How can you care for yourself at home? · Take your medicines exactly as prescribed. Call your doctor if you think you are having a problem with your medicine. You will get more details on the specific medicines your doctor prescribes. · Check with your doctor before you use any over-the-counter medicines. Many cold and allergy medicines, for example, can make this problem worse. Make sure your doctor knows all of the medicines, vitamins, supplements, and herbal remedies you take. · Spread out through the day the amount of fluid you drink. Do not drink a lot at bedtime. · Avoid alcohol and caffeine. · If you have been given a catheter, or if one is already in place, follow the instructions you were given. Always wash your hands before and after you handle the catheter. When should you call for help?   Call your doctor now or seek immediate medical care if:    · You cannot urinate at all, or it is getting harder to urinate.     · You have symptoms of a urinary tract infection. These may include:  ¨ Pain or burning when you urinate. ¨ A frequent need to urinate without being able to pass much urine. ¨ Pain in the flank, which is just below the rib cage and above the waist on either side of the back. ¨ Blood in your urine. ¨ A fever.    Watch closely for changes in your health, and be sure to contact your doctor if:    · You have any problems with your catheter.     · You do not get better as expected. Where can you learn more? Go to http://rebeca-nae.info/. Enter M244 in the search box to learn more about \"Urinary Retention: Care Instructions. \"  Current as of: May 12, 2017  Content Version: 11.7  © 3372-3755 [employer]. Care instructions adapted under license by [employer] (which disclaims liability or warranty for this information). If you have questions about a medical condition or this instruction, always ask your healthcare professional. [place] [address] any warranty or liability for your use of this information.

## 2018-09-14 NOTE — ED PROVIDER NOTES
HPI Comments: 69 y.o. male with past medical history significant for CAD, MI, macular degeneration, and prostate cancer who presents from home with chief complaint of urine retention. Patient states that he has been unable to urinate since this morning. He complains of abdominal pain and abdominal distention. He admits to a h/o enlarged prostate and urinary retention. He follows up with Dr. Deb Abbasi as a urologist.  He notes that he ad 2 injections in his abdomen for testosterone about a week ago as well as a neurogenic bladder test.  He has an upcoming appointment on the 9/20/18 with a bladder specialist.  Patient denies fever, chills, and dysuria. There are no other acute medical concerns at this time. Social hx: Daily tobacco use; negative alcohol use; negative drug use PCP: Dave Guerrero DO Note written by Kelvin Cordova, as dictated by Halina Frazier MD 8:39 PM 
 
 
 
 
The history is provided by the patient. No  was used. Past Medical History:  
Diagnosis Date  CAD (coronary artery disease) MI 2004  Cancer [facility])   
 prostate s/p xrt  Macular degeneration  MI (myocardial infarction) ([facility] [address] 2004 Past Surgical History:  
Procedure Laterality Date  CARDIAC SURG PROCEDURE UNLIST 2 Stents [address]  HX CATARACT REMOVAL    
 bilateral  
 HX UROLOGICAL  6/24/15 RIGHT ROBOTIC PARTIAL NEPHRECTOMY  (LATEX ALLERGY), renal exploration, cystectomy x 3 Family History:  
Problem Relation Age of Onset  Alzheimer Mother  Cancer Father   
  colon Social History Social History  Marital status:  Spouse name: N/A  
 Number of children: N/A  
 Years of education: N/A Occupational History  Not on file. Social History Main Topics  Smoking status: Current Every Day Smoker Packs/day: 0.50 Years: 40.00  Smokeless tobacco: Never Used Comment: 1 pack every 4 days  Alcohol use No  
 Drug use: No  
 Sexual activity: Not on file Other Topics Concern  Not on file Social History Narrative ALLERGIES: Latex Review of Systems Constitutional: Negative for chills and fever. Respiratory: Negative for shortness of breath. Cardiovascular: Negative for chest pain. Gastrointestinal: Negative for abdominal pain, constipation, diarrhea and vomiting. Genitourinary: Positive for decreased urine volume. Negative for dysuria. Neurological: Negative for dizziness and light-headedness. All other systems reviewed and are negative. Vitals:  
 09/13/18 1937 BP: (!) 157/92 Pulse: 88 Resp: 20 Temp: 97.7 °F (36.5 °C) SpO2: 98% Weight: 85.8 kg (189 lb 3.2 oz) Height: 6' (1.829 m) Physical Exam  
Constitutional: He is oriented to person, place, and time. He appears well-developed. No distress. HENT:  
Head: Normocephalic and atraumatic. Eyes: Pupils are equal, round, and reactive to light. No scleral icterus. Neck: Normal range of motion. Neck supple. Cardiovascular: Normal rate and regular rhythm. Pulmonary/Chest: Effort normal and breath sounds normal.  
Abdominal: Soft. He exhibits no distension. There is no tenderness. There is no rebound and no guarding. Bruising at site of injections Genitourinary:  
Genitourinary Comments: Schaefer catheter in place and draining clear Musculoskeletal: Normal range of motion. Neurological: He is alert and oriented to person, place, and time. Skin: Skin is warm and dry. He is not diaphoretic. Psychiatric: He has a normal mood and affect. His behavior is normal. Thought content normal.  
Nursing note and vitals reviewed. Note written by Luther Adam, as dictated by Jelly Oshea MD 8:39 PM 
 
 
MDM Number of Diagnoses or Management Options Urinary retention: established and worsening Diagnosis management comments: Pt presents with hx of urinary retention presents with same - no sign/sysmptoms of UTI & improved after catheter placement. DC home with akins and urology follow-up ED Course Procedures PROGRESS NOTE: 
8:39 Patient states that he is feeling much better after placement of Akins catheter. He rates his current pain as a 0/10. The patient's results have been reviewed with them and/or available family. Patient and/or family verbally conveyed their understanding and agreement of the patient's signs, symptoms, diagnosis, treatment and prognosis and additionally agree to follow up as recommended in the discharge instructions or to return to the Emergency Room should their condition change prior to their follow-up appointment. The patient/family verbally agrees with the care-plan and verbally conveys that all of their questions have been answered. The discharge instructions have also been provided to the patient and/or family with some educational information regarding the patient's diagnosis as well a list of reasons why the patient would want to return to the ER prior to their follow-up appointment, should their condition change.

## 2018-09-14 NOTE — ED NOTES
Pt bladder scanned, 175mL shown. 14FR Coude placed, 300mL of clear yellow urine has drained 2130: MD reviewed discharge instructions and options with patient and patient verbalized understanding. RN reviewed discharge instructions using teachback method. Pt ambulated to exit without difficulty and in no signs of acute distress escorted by self who will drive home. No complaints or needs expressed at this time. VSS at time of discharge. Pt to call Urology in the morning for follow up.

## 2018-09-15 ENCOUNTER — HOSPITAL ENCOUNTER (EMERGENCY)
Age: 78
Discharge: HOME | End: 2018-09-15
Attending: EMERGENCY MEDICINE
Payer: MEDICARE

## 2018-09-15 VITALS
OXYGEN SATURATION: 96 % | DIASTOLIC BLOOD PRESSURE: 74 MMHG | WEIGHT: 192.8 LBS | TEMPERATURE: 97.8 F | BODY MASS INDEX: 26.11 KG/M2 | HEART RATE: 97 BPM | HEIGHT: 72 IN | RESPIRATION RATE: 16 BRPM | SYSTOLIC BLOOD PRESSURE: 117 MMHG

## 2018-09-15 DIAGNOSIS — T83.9XXA URINARY CATHETER COMPLICATION, INITIAL ENCOUNTER (HCC): Primary | ICD-10-CM

## 2018-09-15 PROCEDURE — 99282 EMERGENCY DEPT VISIT SF MDM: CPT

## 2018-09-15 PROCEDURE — 77030005514 HC CATH URETH FOL14 BARD -A

## 2018-09-15 NOTE — ED TRIAGE NOTES
TRIAGE NOTE: Patient reports being seen here for urinary retention on Thursday. Patient reports urinary catheter is leaking and his pants at getting wet.

## 2018-09-15 NOTE — ED PROVIDER NOTES
HPI Comments: 27-year-old male presents emergency room for evaluation of a leaking Schaefer catheter. Patient has an indwelling catheter due to urinary retention. Patient states today he felt like he began to leak around the insertion site and the Schaefer bag. He feels that this kinked. Not having any leaking from around the tip of the penis. He denies fever or chills. No abdominal pain, back pain. No blood in urine. No urinary pain. No change in output. No other complaints. Social hx 
+smoker No alcohol Patient is a 69 y.o. male presenting with urinary catheter problem. The history is provided by the patient. Urinary Catheter Problem Pertinent negatives include no chills, no nausea, no vomiting, no frequency, no hematuria, no urgency, no abdominal pain and no back pain. His past medical history is significant for urinary catheter problem. Past Medical History:  
Diagnosis Date  CAD (coronary artery disease) MI 2004  Cancer [facility])   
 prostate s/p xrt  Macular degeneration  MI (myocardial infarction) ([facility] [address] 2004 Past Surgical History:  
Procedure Laterality Date  CARDIAC SURG PROCEDURE UNLIST 2 Stents [address]  HX CATARACT REMOVAL    
 bilateral  
 HX UROLOGICAL  6/24/15 RIGHT ROBOTIC PARTIAL NEPHRECTOMY  (LATEX ALLERGY), renal exploration, cystectomy x 3 Family History:  
Problem Relation Age of Onset  Alzheimer Mother  Cancer Father   
  colon Social History Social History  Marital status:  Spouse name: N/A  
 Number of children: N/A  
 Years of education: N/A Occupational History  Not on file. Social History Main Topics  Smoking status: Current Every Day Smoker Packs/day: 0.50 Years: 40.00  Smokeless tobacco: Never Used Comment: 1 pack every 4 days  Alcohol use No  
 Drug use: No  
 Sexual activity: Not on file Other Topics Concern  Not on file Social History Narrative ALLERGIES: Latex Review of Systems Constitutional: Negative for chills and fever. Respiratory: Negative for chest tightness, shortness of breath and wheezing. Cardiovascular: Negative for chest pain and palpitations. Gastrointestinal: Negative for abdominal pain, nausea and vomiting. Genitourinary: Negative for decreased urine volume, difficulty urinating, dysuria, frequency, hematuria, testicular pain and urgency. Musculoskeletal: Negative for arthralgias, back pain, myalgias, neck pain and neck stiffness. Skin: Negative for rash and wound. All other systems reviewed and are negative. Vitals:  
 09/15/18 1639 BP: 117/74 Pulse: 97 Resp: 16 Temp: 97.8 °F (36.6 °C) SpO2: 96% Weight: 87.5 kg (192 lb 12.8 oz) Height: 6' (1.829 m) Physical Exam  
Constitutional: He is oriented to person, place, and time. He appears well-developed and well-nourished. No distress. HENT:  
Head: Normocephalic and atraumatic. Right Ear: External ear normal.  
Left Ear: External ear normal.  
Eyes: Conjunctivae and EOM are normal. Pupils are equal, round, and reactive to light. Neck: Normal range of motion. Neck supple. Cardiovascular: Normal rate, regular rhythm and normal heart sounds. Pulmonary/Chest: Effort normal and breath sounds normal. No respiratory distress. He has no wheezes. Abdominal: Soft. Normal appearance and bowel sounds are normal. He exhibits no shifting dullness, no distension, no pulsatile liver, no abdominal bruit, no pulsatile midline mass and no mass. There is no hepatosplenomegaly, splenomegaly or hepatomegaly. There is no tenderness. There is no rigidity, no rebound, no guarding, no CVA tenderness, no tenderness at McBurney's point and negative Sampson's sign. Genitourinary:  
Genitourinary Comments: Akins in place, kinked at insertion to akins bag No swelling or redness. Musculoskeletal: Normal range of motion. Neurological: He is alert and oriented to person, place, and time. Skin: Skin is warm and dry. Psychiatric: He has a normal mood and affect. His behavior is normal. Judgment and thought content normal.  
Nursing note and vitals reviewed. MDM Number of Diagnoses or Management Options Urinary catheter complication, initial encounter [facility]):  
Diagnosis management comments: Patient's Schaefer catheter was kinked at the insertion to the leg bag. Leg bag was replaced. Catheter is draining nicely. There is no leaking. . Will have the patient follow up with urology. Patient's results have been reviewed with them. Patient and/or family have verbally conveyed their understanding and agreement of the patient's signs, symptoms, diagnosis, treatment and prognosis and additionally agree to follow up as recommended or return to the Emergency Room should their condition change prior to follow-up. Discharge instructions have also been provided to the patient with some educational information regarding their diagnosis as well a list of reasons why they would want to return to the ER prior to their follow-up appointment should their condition change. Amount and/or Complexity of Data Reviewed Discuss the patient with other providers: yes (ER attending-Jose Juan) Patient Progress Patient progress: stable ED Course Procedures Pt case including HPI, PE, and all available lab and radiology results has been discussed with attending physician. Opportunity to evaluate patient has been provided to ER attending. Discharge and prescription plan has been agreed upon.

## 2018-09-15 NOTE — DISCHARGE INSTRUCTIONS
Learning About How to Care for an Indwelling Urinary Catheter  Learning About How to Care for an Indwelling Urinary Catheter    A urinary catheter is a flexible plastic tube used to drain urine from the bladder when a person cannot urinate. A doctor will place the catheter into the bladder by inserting it through the urethra. The urethra is the opening that carries urine from the bladder to the outside of the body. When the catheter is in the bladder, a small balloon is used to keep the catheter in place. The catheter lets urine drain from the bladder into a bag. The bag is usually attached to the thigh. Urinary catheters can be used in both men and women. A catheter that stays in place for a longer period of time is called an indwelling catheter. A catheter may be needed because of certain medical conditions. These include an enlarged prostate or problems controlling urine. It may be used after surgery on the pelvis or urinary tract. Urinary catheters are also used when the lower part of the body is paralyzed. When helping a loved one with a catheter, try to be relaxed. Caring for a catheter can be embarrassing for both of you. This may be especially true if you are caring for someone of the opposite sex. If you are calm and don't seem embarrassed, the person may feel more comfortable. How do you take care of the catheter? Wear disposable gloves when handling someone's catheter. Make sure to follow all of the instructions the doctor has given. And always wash your hands before and after you're done. Here are some other things to remember when caring for someone's catheter:  · Make sure that urine is running out of the catheter into the urine collection bag. And make sure that the catheter tubing does not get twisted or bent. · Keep the urine collection bag below the level of the bladder. At night it may be helpful to hang the bag on the side of the bed.   · Make sure that the urine collection bag does not drag and pull on the catheter. · It is okay to shower with a catheter and urine collection bag in place, unless the doctor says not to. · Check for swelling or signs of infection in the area around the catheter. Signs of infection include pus or irritated, swollen, red, or tender skin. · Clean the area around the catheter twice a day with soap and water. Dry with a clean towel afterward. · Do not apply powder or lotion to the skin around the catheter. · Do not tug or pull on the catheter. · Sexual intercourse may still be possible for individuals who wear a catheter. It is best to talk with a doctor about options. How do you empty the bag? The urine collection bag needs to be emptied regularly. It is best to empty the bag when it's about half full or at bedtime. If the doctor has asked you to measure the amount of urine, do that before you empty the urine into the toilet. When you are ready to empty the bag, follow these steps:  1. Put on disposable gloves. 2. Remove the drain spout from its sleeve at the bottom of the collection bag. Open the valve on the spout. 3. Let the urine flow out of the bag and into the toilet or a container. Do not let the tubing or drain spout touch anything. 4. After you empty the bag, wipe off any liquid on the end of the drain spout. Close the valve and put the drain spout back into its sleeve. 5. Remove your gloves and throw them away. 6. Wash your hands with soap and water. How do you care for someone after the catheter is removed? After the catheter is taken out, the person may have trouble urinating. If this happens, try helping them sit in a few inches of warm water (sitz bath). If the urge to urinate comes during the sitz bath, it may be easier for them to urinate while still in the bath. Some burning may happen the first few times the person urinates. If the burning lasts longer, it may be a sign of an infection.   If the catheter causes irritation or a rash, wearing loose, cotton underwear may help. Watch closely for changes in the person's health, and be sure to contact their doctor if you notice any problems. Where can you learn more? Go to http://rebeca-nae.info/. Enter W409 in the search box to learn more about \"Learning About How to Care for an Indwelling Urinary Catheter. \"  Current as of: October 6, 2017  Content Version: 11.7  © 0845-3813 [employer]. Care instructions adapted under license by [employer] (which disclaims liability or warranty for this information). If you have questions about a medical condition or this instruction, always ask your healthcare professional. [place] [address] any warranty or liability for your use of this information.

## 2018-12-04 RX ORDER — LORAZEPAM 1 MG/1
TABLET ORAL
Qty: 30 TAB | Refills: 2 | OUTPATIENT
Start: 2018-12-04

## 2018-12-27 NOTE — TELEPHONE ENCOUNTER
Called the pt and advised him that his prescription was denied due to the fact that he has not been seen in the office in over a year. The pt stated that he is fine and does not need to see Dr. Vernell Ortiz. Advised him that he must be seen in the office to maintain prescriptions and refills and that a provider cannot refill medications if the pt has not been seen in the office for over 13 months. The pt stated that he would check his schedule and call to make an appt.

## 2019-01-04 ENCOUNTER — OFFICE VISIT (OUTPATIENT)
Dept: INTERNAL MEDICINE CLINIC | Age: 79
End: 2019-01-04

## 2019-01-04 VITALS
WEIGHT: 187 LBS | HEIGHT: 72 IN | OXYGEN SATURATION: 98 % | RESPIRATION RATE: 18 BRPM | TEMPERATURE: 98.3 F | HEART RATE: 93 BPM | SYSTOLIC BLOOD PRESSURE: 102 MMHG | BODY MASS INDEX: 25.33 KG/M2 | DIASTOLIC BLOOD PRESSURE: 61 MMHG

## 2019-01-04 DIAGNOSIS — Z90.79 S/P TURP: ICD-10-CM

## 2019-01-04 DIAGNOSIS — Z00.00 MEDICARE ANNUAL WELLNESS VISIT, SUBSEQUENT: ICD-10-CM

## 2019-01-04 DIAGNOSIS — C61 PROSTATE CANCER (HCC): ICD-10-CM

## 2019-01-04 DIAGNOSIS — F41.9 ANXIETY: ICD-10-CM

## 2019-01-04 DIAGNOSIS — I25.10 CORONARY ARTERY DISEASE INVOLVING NATIVE HEART WITHOUT ANGINA PECTORIS, UNSPECIFIED VESSEL OR LESION TYPE: Primary | ICD-10-CM

## 2019-01-04 DIAGNOSIS — Z95.5 STENTED CORONARY ARTERY: ICD-10-CM

## 2019-01-04 RX ORDER — LORAZEPAM 1 MG/1
TABLET ORAL
Qty: 30 TAB | Refills: 5 | Status: SHIPPED | OUTPATIENT
Start: 2019-01-04 | End: 2019-10-22 | Stop reason: SDUPTHER

## 2019-01-04 NOTE — PROGRESS NOTES
Schedule of Personalized Health Plan (Provide Copy to Patient) The best way to stay healthy is to live a healthy lifestyle. A healthy lifestyle includes regular exercise, eating a well-balanced diet, keeping a healthy weight and not smoking. Regular physical exams and screening tests are another important way to take care of yourself. Preventive exams provided by health care providers can find health problems early when treatment works best and can keep you from getting certain diseases or illnesses. Preventive services include exams, lab tests, screenings, shots, monitoring and information to help you take care of your own health. All people over 65 should have a pneumonia shot. Pneumonia shots are usually only needed once in a lifetime unless your doctor decides differently. All people over 65 should have a yearly flu shot. People over 65 are at medium to high risk for Hepatitis B. Three shots are needed for complete protection. In addition to your physical exam, some screening tests are recommended: 
 
Bone mass measurement (dexa scan) is recommended every two years if you have certain risk factors, such as personal history of vertebral fracture or chronic steroid medication use Diabetes Mellitus screening is recommended every year. Glaucoma is an eye disease caused by high pressure in the eye. An eye exam is recommended every year. Cardiovascular screening tests that check your cholesterol and other blood fat (lipid) levels are recommended every five years. Colorectal Cancer screening tests help to find pre-cancerous polyps (growths in the colon) so they can be removed before they turn into cancer. Tests ordered for screening depend on your personal and family history risk factors. Screening for Prostate Cancer is recommended yearly with a digital rectal exam and/or a PSA test 
 
Here is a list of your current Health Maintenance items with a due date: 
Health Maintenance Topic Date Due  
 DTaP/Tdap/Td series (1 - Tdap) 02/01/1962  Shingrix Vaccine Age 50> (1 of 2) 02/01/1991  GLAUCOMA SCREENING Q2Y  02/01/2006  Pneumococcal 65+ Low/Medium Risk (1 of 2 - PCV13) 02/01/2006  Influenza Age 5 to Adult  06/18/2019 (Originally 8/1/2018)  MEDICARE YEARLY EXAM  01/05/2020

## 2019-01-04 NOTE — PROGRESS NOTES
Bree Harrison is a 69 y.o. male and presents for annual Medicare Wellness Visit. Problem List: Reviewed with patient and discussed risk factors. Patient Active Problem List  
Diagnosis Code  CAD (coronary artery disease) I25.10  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  Prostate cancer ([facility] [address] C61  Macular degeneration H35.30  Stented coronary artery Z95.5  Anxiety F41.9  Renal cell adenocarcinoma ([facility] [address] C64.9  Renal mass N28.89  S/P TURP Z90.79  
 Other urinary incontinence N39.498  H/O partial nephrectomy Z90.5  Rosacea L71.9 Current medical providers:  Patient Care Team: 
Jose Juan Lange DO as PCP - General (Internal Medicine) PSH: Reviewed with patient Past Surgical History:  
Procedure Laterality Date  CARDIAC SURG PROCEDURE UNLIST 2 Stents [address]  HX CATARACT REMOVAL    
 bilateral  
 HX UROLOGICAL  6/24/15 RIGHT ROBOTIC PARTIAL NEPHRECTOMY  (LATEX ALLERGY), renal exploration, cystectomy x 3 SH: Reviewed with patient Social History Tobacco Use  Smoking status: Current Every Day Smoker Packs/day: 0.50 Years: 40.00 Pack years: 20.00  Smokeless tobacco: Never Used  Tobacco comment: 1 pack every 4 days Substance Use Topics  Alcohol use: No  
  Alcohol/week: 0.0 oz  Drug use: No  
 
 
FH: Reviewed with patient Family History Problem Relation Age of Onset  Alzheimer Mother  Cancer Father   
     colon  No Known Problems Sister  No Known Problems Sister  No Known Problems Sister Medications/Allergies: Reviewed with patient Current Outpatient Medications on File Prior to Visit Medication Sig Dispense Refill  trimethoprim-sulfamethoxazole (BACTRIM DS, SEPTRA DS) 160-800 mg per tablet TAKE ONE TABLET BY MOUTH ON MON.,WED. AND FRI. FOR  UTI  PROPHYLAXIS 15 Tab 11  
 Aspirin, Buffered 81 mg tab Take  by mouth.  bisacodyl (DULCOLAX, BISACODYL,) 5 mg EC tablet Take 5 mg by mouth daily. Indications: CONSTIPATION    
 lisinopril (PRINIVIL, ZESTRIL) 5 mg tablet Take 1 Tab by mouth daily. 90 Tab 1  
 saw palmetto 160 mg cap Take 1 Cap by mouth two (2) times a day.  multivitamin (ONE A DAY) tablet Take 1 Tab by mouth daily.  vit A/vit C/vit E/zinc/copper (PRESERVISION AREDS PO) 2 Tabs. No current facility-administered medications on file prior to visit. Allergies Allergen Reactions  Latex Other (comments) Blistering of the skin Objective: 
Visit Vitals /61 (BP 1 Location: Left arm, BP Patient Position: Sitting) Pulse 93 Temp 98.3 °F (36.8 °C) (Oral) Resp 18 Ht 6' (1.829 m) Wt 187 lb (84.8 kg) SpO2 98% BMI 25.36 kg/m² Body mass index is 25.36 kg/m². Assessment of cognitive impairment: Alert and oriented x 3 Depression Screen: PHQ over the last two weeks 1/4/2019 Little interest or pleasure in doing things Not at all Feeling down, depressed, irritable, or hopeless Not at all Total Score PHQ 2 0 Fall Risk Assessment:   
Fall Risk Assessment, last 12 mths 1/4/2019 Able to walk? Yes Fall in past 12 months? No  
 
 
Functional Ability:  
Does the patient exhibit a steady gait? yes How long did it take the patient to get up and walk from a sitting position? 7 sec Is the patient self reliant?  (ie can do own laundry, meals, household chores)  yes Does the patient handle his/her own medications? yes Does the patient handle his/her own money? yes Is the patients home safe (ie good lighting, handrails on stairs and bath, etc.)? yes Did you notice or did patient express any hearing difficulties? no 
  
Did you notice or did patient express any vision difficulties?   no 
  
Were distance and reading eye charts used? no 
  
 
Advance Care Planning:  
Patient was offered the opportunity to discuss advance care planning:  yes Does patient have an Advance Directive:  yes If no, did you provide information on Caring Connections?  no  
 
Plan:   
 
Orders Placed This Encounter  vit A/vit C/vit E/zinc/copper (PRESERVISION AREDS PO)  LORazepam (ATIVAN) 1 mg tablet Health Maintenance Topic Date Due  
 DTaP/Tdap/Td series (1 - Tdap) 02/01/1962  Shingrix Vaccine Age 50> (1 of 2) 02/01/1991  GLAUCOMA SCREENING Q2Y  02/01/2006  Pneumococcal 65+ Low/Medium Risk (1 of 2 - PCV13) 02/01/2006  Influenza Age 5 to Adult  06/18/2019 (Originally 8/1/2018)  MEDICARE YEARLY EXAM  01/05/2020 *Patient verbalized understanding and agreement with the plan. A copy of the After Visit Summary with personalized health plan was given to the patient today.

## 2019-01-04 NOTE — PROGRESS NOTES
Health Maintenance Due Topic Date Due  
 DTaP/Tdap/Td series (1 - Tdap) 02/01/1962  Shingrix Vaccine Age 50> (1 of 2) 02/01/1991  GLAUCOMA SCREENING Q2Y  02/01/2006  Pneumococcal 65+ Low/Medium Risk (1 of 2 - PCV13) 02/01/2006  Influenza Age 5 to Adult  08/01/2018  MEDICARE YEARLY EXAM  11/08/2018 Chief Complaint Patient presents with  Medication Refill  Coronary Artery Disease  Anxiety  Incontinence  
  had urethra stretched in Nov 2018 and now states is incontinent 1. Have you been to the ER, urgent care clinic since your last visit? Hospitalized since your last visit? No 
 
2. Have you seen or consulted any other health care providers outside of the [address] [place] [address] since your last visit? Include any pap smears or colon screening. No 
 
3) Do you have an Advance Directive on file? no 
 
4) Are you interested in receiving information on Advance Directives? NO Patient is accompanied by self I have received verbal consent from [address] [place] [address] to discuss any/all medical information while they are present in the room.

## 2019-01-30 NOTE — PROGRESS NOTES
HISTORY OF PRESENT ILLNESS Kym Treviño is a 69 y.o. male. Pt. comes in after over a year for f/u. Has multiple medical problems. He has chronic stress and anxiety. Takes lorazepam as needed. Needs a prescription for it. His cardiac status has been stable. No chest pain or dyspnea. Lives with his wife. Has a coronary stent. Followed by cardiologist.  Also followed by urologist with history of prostate cancer. History of TURP. Has chronic urgency. Denies dysuria or hematuria. Lives with his wife. He is a . Reports compliance with medications and diet. Med list and most recent labs/studies reviewed with pt. Trying to be active physically to control weight. Needs med refills. Due for repeat labs. Reports no other new c/o. HPI Review of Systems Constitutional: Negative. HENT: Negative. Eyes: Negative. Respiratory: Negative for shortness of breath. Cardiovascular: Negative for chest pain and leg swelling. Gastrointestinal: Negative for abdominal pain. Genitourinary: Positive for urgency. Negative for dysuria and hematuria. Musculoskeletal: Negative for joint pain. Skin: Negative. Neurological: Negative for dizziness, sensory change, focal weakness and headaches. Psychiatric/Behavioral: Negative for depression. The patient is nervous/anxious. All other systems reviewed and are negative. Physical Exam  
Constitutional: He is oriented to person, place, and time. He appears well-developed and well-nourished. No distress. pleasant HENT:  
Head: Normocephalic and atraumatic. Mouth/Throat: Oropharynx is clear and moist.  
Eyes: Conjunctivae are normal. No scleral icterus. Neck: Normal range of motion. Neck supple. No JVD present. No thyromegaly present. Cardiovascular: Normal rate and regular rhythm. Pulmonary/Chest: Effort normal and breath sounds normal. No respiratory distress. He has no wheezes. He has no rales. Abdominal: Soft. Bowel sounds are normal. He exhibits no distension. There is no tenderness. Musculoskeletal: He exhibits no tenderness. Neurological: He is alert and oriented to person, place, and time. Coordination normal.  
Skin: Skin is warm and dry. No rash noted. There is erythema (nose and face c/w rosacea). Psychiatric: He has a normal mood and affect. His behavior is normal.  
Nursing note and vitals reviewed. ASSESSMENT and PLAN Diagnoses and all orders for this visit: 1. Coronary artery disease involving native heart without angina pectoris, unspecified vessel or lesion type -     METABOLIC PANEL, COMPREHENSIVE 
-     CBC W/O DIFF 
-     LIPID PANEL 2. Stented coronary artery -     LIPID PANEL 3. Anxiety -     LORazepam (ATIVAN) 1 mg tablet; 1 daily prn anxiety 4. Prostate cancer ([facility] [address] 5. S/P TURP 6. Medicare annual wellness visit, subsequent Follow-up Disposition: 
Return in about 6 months (around 7/4/2019). lab results and schedule of future lab studies reviewed with patient 
reviewed diet, exercise and weight control 
reviewed medications and side effects in detail F/u with other MD's as scheduled Overall stable

## 2019-04-22 RX ORDER — SULFAMETHOXAZOLE AND TRIMETHOPRIM 800; 160 MG/1; MG/1
TABLET ORAL
Qty: 15 TAB | Refills: 11 | Status: SHIPPED | OUTPATIENT
Start: 2019-04-22 | End: 2020-05-29

## 2019-05-20 RX ORDER — LISINOPRIL 5 MG/1
5 TABLET ORAL DAILY
Qty: 90 TAB | Refills: 1 | Status: SHIPPED | OUTPATIENT
Start: 2019-05-20 | End: 2019-10-22 | Stop reason: SDUPTHER

## 2019-10-22 ENCOUNTER — OFFICE VISIT (OUTPATIENT)
Dept: INTERNAL MEDICINE CLINIC | Age: 79
End: 2019-10-22

## 2019-10-22 VITALS
SYSTOLIC BLOOD PRESSURE: 92 MMHG | RESPIRATION RATE: 20 BRPM | DIASTOLIC BLOOD PRESSURE: 62 MMHG | OXYGEN SATURATION: 94 % | TEMPERATURE: 97.6 F | HEART RATE: 78 BPM | WEIGHT: 184.6 LBS | BODY MASS INDEX: 25 KG/M2 | HEIGHT: 72 IN

## 2019-10-22 DIAGNOSIS — I25.10 CORONARY ARTERY DISEASE INVOLVING NATIVE HEART WITHOUT ANGINA PECTORIS, UNSPECIFIED VESSEL OR LESION TYPE: ICD-10-CM

## 2019-10-22 DIAGNOSIS — F41.9 ANXIETY: ICD-10-CM

## 2019-10-22 DIAGNOSIS — C61 PROSTATE CANCER (HCC): ICD-10-CM

## 2019-10-22 DIAGNOSIS — J06.9 URI WITH COUGH AND CONGESTION: Primary | ICD-10-CM

## 2019-10-22 DIAGNOSIS — J30.89 NON-SEASONAL ALLERGIC RHINITIS, UNSPECIFIED TRIGGER: ICD-10-CM

## 2019-10-22 DIAGNOSIS — Z95.5 STENTED CORONARY ARTERY: ICD-10-CM

## 2019-10-22 RX ORDER — LORAZEPAM 1 MG/1
TABLET ORAL
Qty: 30 TAB | Refills: 5 | Status: SHIPPED | OUTPATIENT
Start: 2019-10-22 | End: 2020-06-19 | Stop reason: SDUPTHER

## 2019-10-22 RX ORDER — LISINOPRIL 5 MG/1
5 TABLET ORAL DAILY
Qty: 90 TAB | Refills: 1 | Status: SHIPPED | OUTPATIENT
Start: 2019-10-22 | End: 2020-09-01

## 2019-10-22 RX ORDER — CODEINE PHOSPHATE AND GUAIFENESIN 10; 100 MG/5ML; MG/5ML
5 SOLUTION ORAL
Qty: 118 ML | Refills: 0 | Status: SHIPPED | OUTPATIENT
Start: 2019-10-22 | End: 2019-10-25

## 2019-10-22 RX ORDER — FLUTICASONE PROPIONATE 50 MCG
2 SPRAY, SUSPENSION (ML) NASAL DAILY
Qty: 1 BOTTLE | Refills: 2 | Status: SHIPPED | OUTPATIENT
Start: 2019-10-22 | End: 2021-06-11 | Stop reason: ALTCHOICE

## 2019-10-22 NOTE — PROGRESS NOTES
Health Maintenance Due   Topic Date Due    DTaP/Tdap/Td series (1 - Tdap) 02/01/1962    Shingrix Vaccine Age 50> (1 of 2) 02/01/1991    GLAUCOMA SCREENING Q2Y  02/01/2006    Pneumococcal 65+ years (1 of 2 - PCV13) 02/01/2006       Chief Complaint   Patient presents with    Coronary Artery Disease     f/u care from January    Anxiety    Referral Request    Cold Symptoms     x 1 wk       1. Have you been to the ER, urgent care clinic since your last visit? Hospitalized since your last visit? No    2. Have you seen or consulted any other health care providers outside of the [address] [place] [address] since your last visit? Include any pap smears or colon screening. No    3) Do you have an Advance Directive on file? no    4) Are you interested in receiving information on Advance Directives? NO      Patient is accompanied by self I have received verbal consent from [address] [place] [address] to discuss any/all medical information while they are present in the room.

## 2019-10-31 NOTE — PROGRESS NOTES
HISTORY OF PRESENT ILLNESS  Bozena Carreno is a 67 y.o. male. Pt. comes in after over a year for f/u. Has multiple medical problems. Reports a few days of cold symptoms with congestion and cough. Has nasal congestion. No fevers, chest pain or dyspnea. Has chronic allergy issues. He has chronic stress and anxiety. Takes lorazepam as needed. His cardiac status has been stable. Has a coronary stent. Followed by cardiologist.  Followed by urologist with history of prostate cancer. History of TURP. Has chronic urgency. Denies dysuria or hematuria. Lives with his wife. He is a . Reports compliance with medications and diet. Med list and most recent labs/studies reviewed with pt. Trying to be active physically to control weight. Needs med refills. Due for repeat labs. Reports no other new c/o. Anxiety   Pertinent negatives include no chest pain, no abdominal pain, no headaches and no shortness of breath. Referral Request   Pertinent negatives include no chest pain, no abdominal pain, no headaches and no shortness of breath. Cold Symptoms   Pertinent negatives include no chest pain, no headaches and no shortness of breath. Review of Systems   Constitutional: Negative. HENT: Negative. Eyes: Negative. Respiratory: Negative for shortness of breath. Cardiovascular: Negative for chest pain and leg swelling. Gastrointestinal: Negative for abdominal pain. Genitourinary: Positive for urgency. Negative for dysuria and hematuria. Musculoskeletal: Negative for joint pain. Skin: Negative. Neurological: Negative for dizziness, sensory change, focal weakness and headaches. Psychiatric/Behavioral: Negative for depression. The patient is nervous/anxious. All other systems reviewed and are negative. Physical Exam   Constitutional: He is oriented to person, place, and time. He appears well-developed and well-nourished. No distress.    pleasant   HENT:   Head: Normocephalic and atraumatic. Mouth/Throat: Oropharynx is clear and moist.   Eyes: Conjunctivae are normal. No scleral icterus. Neck: Normal range of motion. Neck supple. No JVD present. No thyromegaly present. Cardiovascular: Normal rate and regular rhythm. Pulmonary/Chest: Effort normal and breath sounds normal. No respiratory distress. He has no wheezes. He has no rales. Abdominal: Soft. Bowel sounds are normal. He exhibits no distension. There is no tenderness. Musculoskeletal: He exhibits no tenderness. Neurological: He is alert and oriented to person, place, and time. Coordination normal.   Skin: Skin is warm and dry. No rash noted. There is erythema (nose and face c/w rosacea). Psychiatric: He has a normal mood and affect. His behavior is normal.   Nursing note and vitals reviewed. ASSESSMENT and PLAN  Diagnoses and all orders for this visit:    1. URI with cough and congestion  -     guaiFENesin-codeine (ROBAFEN AC) 100-10 mg/5 mL solution; Take 5 mL by mouth three (3) times daily as needed for Cough for up to 3 days. Max Daily Amount: 15 mL. 2. Non-seasonal allergic rhinitis, unspecified trigger    3. Prostate cancer ([facility] [address]    4. Coronary artery disease involving native heart without angina pectoris, unspecified vessel or lesion type  -     LIPID PANEL  -     METABOLIC PANEL, COMPREHENSIVE  -     CBC W/O DIFF    5. Stented coronary artery    6. Anxiety  -     LORazepam (ATIVAN) 1 mg tablet; 1 daily prn anxiety    Other orders  -     fluticasone propionate (FLONASE) 50 mcg/actuation nasal spray; 2 Sprays by Both Nostrils route daily. -     lisinopril (PRINIVIL, ZESTRIL) 5 mg tablet; Take 1 Tab by mouth daily. Follow-up and Dispositions    · Return in about 6 months (around 4/22/2020).         lab results and schedule of future lab studies reviewed with patient  reviewed diet, exercise and weight control  reviewed medications and side effects in detail  F/u with other MD's as scheduled  Overall stable

## 2019-12-03 ENCOUNTER — HOSPITAL ENCOUNTER (OUTPATIENT)
Dept: PET IMAGING | Age: 79
Discharge: HOME | End: 2019-12-03
Attending: UROLOGY
Payer: MEDICARE

## 2019-12-03 VITALS — BODY MASS INDEX: 26.01 KG/M2 | WEIGHT: 192 LBS | HEIGHT: 72 IN

## 2019-12-03 DIAGNOSIS — C61 PROSTATE CARCINOMA (HCC): ICD-10-CM

## 2019-12-03 PROCEDURE — 78815 PET IMAGE W/CT SKULL-THIGH: CPT

## 2019-12-03 RX ORDER — SODIUM CHLORIDE 0.9 % (FLUSH) 0.9 %
10 SYRINGE (ML) INJECTION
Status: DISCONTINUED | OUTPATIENT
Start: 2019-12-03 | End: 2019-12-04 | Stop reason: HOSPADM

## 2020-04-01 ENCOUNTER — OFFICE VISIT (OUTPATIENT)
Dept: SURGERY | Age: 80
End: 2020-04-01

## 2020-04-01 VITALS
SYSTOLIC BLOOD PRESSURE: 115 MMHG | DIASTOLIC BLOOD PRESSURE: 69 MMHG | TEMPERATURE: 97.4 F | HEIGHT: 72 IN | OXYGEN SATURATION: 96 % | WEIGHT: 186 LBS | HEART RATE: 91 BPM | BODY MASS INDEX: 25.19 KG/M2

## 2020-04-01 DIAGNOSIS — K86.89 PANCREATIC MASS: Primary | ICD-10-CM

## 2020-04-01 NOTE — PROGRESS NOTES
Chief Complaint   Patient presents with    Mass     seen at the request of Dr. Liila Solorio eval pelvic mass       Discussed advanced directive. Patient states that he does not have an advanced directive.

## 2020-04-01 NOTE — PROGRESS NOTES
HISTORY OF PRESENT ILLNESS  Zenobia Ross is a 79 y.o. male who comes in for consultation by Dr Natalia Ochoa for a pancreatic mass  HPI  He was noted on a CT for follow up for prostate and renal cell ca on 3/11/2020 to have a 1.3 cm hyperenhancing mass in the uncinate region of the pancreas. He denies prior pancreatic issues, abdominal pain, nausea, vomiting, back pain, unexplained weight loss or bone pain. A PET scan 12/3/2019 was normal.   He does have an elevated PSA to 8.7. Past Medical History:   Diagnosis Date    CAD (coronary artery disease)     MI 2004    Cancer [facility])     prostate s/p xrt    Macular degeneration     MI (myocardial infarction) ([facility] [address] 2004     Past Surgical History:   Procedure Laterality Date    CARDIAC SURG PROCEDURE UNLIST      2 Stents    HX APPENDECTOMY  1955    HX CATARACT REMOVAL      bilateral    HX UROLOGICAL  6/24/15    RIGHT ROBOTIC PARTIAL NEPHRECTOMY  (LATEX ALLERGY), renal exploration, cystectomy x 3     Family History   Problem Relation Age of Onset    Alzheimer Mother     Cancer Father         colon    No Known Problems Sister     No Known Problems Sister     No Known Problems Sister      Social History     Tobacco Use    Smoking status: Current Every Day Smoker     Packs/day: 0.50     Years: 40.00     Pack years: 20.00    Smokeless tobacco: Never Used    Tobacco comment: 1 pack every 4 days   Substance Use Topics    Alcohol use: No     Alcohol/week: 0.0 standard drinks    Drug use: No     Current Outpatient Medications   Medication Sig    lisinopril (PRINIVIL, ZESTRIL) 5 mg tablet Take 1 Tab by mouth daily.  trimethoprim-sulfamethoxazole (BACTRIM DS, SEPTRA DS) 160-800 mg per tablet TAKE ONE TABLET BY MOUTH ON MON,WED, AND FRI FOR UTI PROPHYLAXIS    vit A/vit C/vit E/zinc/copper (PRESERVISION AREDS PO) 2 Tabs.  Aspirin, Buffered 81 mg tab Take  by mouth.  saw palmetto 160 mg cap Take 1 Cap by mouth two (2) times a day.     multivitamin (ONE A DAY) tablet Take 1 Tab by mouth daily.  fluticasone propionate (FLONASE) 50 mcg/actuation nasal spray 2 Sprays by Both Nostrils route daily.  LORazepam (ATIVAN) 1 mg tablet 1 daily prn anxiety    bisacodyl (DULCOLAX, BISACODYL,) 5 mg EC tablet Take 5 mg by mouth daily. Indications: CONSTIPATION     No current facility-administered medications for this visit. Allergies   Allergen Reactions    Latex Other (comments)     Blistering of the skin       Review of Systems   Constitutional: Negative for chills, diaphoresis, fever, malaise/fatigue and weight loss. HENT: Negative for congestion, ear pain and sore throat. Eyes: Negative for blurred vision and pain. Respiratory: Negative for cough, hemoptysis, sputum production, shortness of breath, wheezing and stridor. Cardiovascular: Negative for chest pain, palpitations, orthopnea, claudication, leg swelling and PND. Gastrointestinal: Negative for blood in stool, constipation, diarrhea, heartburn, melena, nausea and vomiting. Genitourinary: Positive for frequency and urgency (incontinence). Negative for dysuria, flank pain and hematuria. Musculoskeletal: Negative for back pain, joint pain, myalgias and neck pain. Skin: Negative for itching and rash. Neurological: Negative for dizziness, tremors, focal weakness, seizures, weakness and headaches. Endo/Heme/Allergies: Negative for polydipsia. Bruises/bleeds easily. Psychiatric/Behavioral: Negative for depression and memory loss. The patient is not nervous/anxious. Visit Vitals  /69   Pulse 91   Temp 97.4 °F (36.3 °C)   Ht 6' (1.829 m)   Wt 84.4 kg (186 lb)   SpO2 96%   BMI 25.23 kg/m²       Physical Exam  Constitutional:       General: He is not in acute distress. Appearance: Normal appearance. He is well-developed. He is not diaphoretic. HENT:      Head: Normocephalic and atraumatic. Mouth/Throat:      Pharynx: No oropharyngeal exudate.    Eyes:      General: No scleral icterus. Conjunctiva/sclera: Conjunctivae normal.      Pupils: Pupils are equal, round, and reactive to light. Neck:      Musculoskeletal: Normal range of motion and neck supple. Thyroid: No thyromegaly. Trachea: No tracheal deviation. Cardiovascular:      Rate and Rhythm: Normal rate and regular rhythm. Heart sounds: Normal heart sounds. No murmur. No friction rub. No gallop. Pulmonary:      Effort: Pulmonary effort is normal. No respiratory distress. Breath sounds: Normal breath sounds. No stridor. No wheezing or rales. Abdominal:      General: Bowel sounds are normal. There is no distension. Palpations: Abdomen is soft. There is no mass. Tenderness: There is no abdominal tenderness. There is no guarding or rebound. Hernia: A hernia is present. Hernia is present in the ventral area (small reducible hernia near prior umbilical incision). There is no hernia in the right inguinal area or left inguinal area. Genitourinary:     Penis: Circumcised. Scrotum/Testes: Normal. Cremasteric reflex is present. Musculoskeletal: Normal range of motion. General: No tenderness. Lymphadenopathy:      Cervical: No cervical adenopathy. Skin:     General: Skin is warm and dry. Findings: No erythema or rash. Neurological:      Mental Status: He is alert and oriented to person, place, and time. Cranial Nerves: No cranial nerve deficit. Coordination: Coordination normal.   Psychiatric:         Behavior: Behavior normal.         Thought Content: Thought content normal.         Judgment: Judgment normal.     I reviewed his CT from [facility] as well as prior CT at [facility] from 7/2018 and 5/2013. Retrospectively it has been there since 2013    ASSESSMENT and PLAN  1. Mass in uncinate process of pancreas is very likely a neuroendocrine tumor of the pancreas. Fortunately it has been stable for many years.   Due to its location it would not be amenable to an enucleation and would require a pancreaticoduodenectomy. I explained to him about the procedure and risks and benefits. We also discussed EUS bx by GI.    2.  Hx prostate cancer and rising PSA to 8.7  3. Hx renal cell ca  4. CAD s/p PCI in 2004 and followed by Dr Christine Velasco  5. Essential hypertension. Stable on rx  6.   Tobacco abuse previously 1 ppd but now 1/4 ppd  Recommended cessation    After a lengthy discussion he prefers ongoing observation with plan for a repeat triple phase CT with contrast in 1 year  He will RTC after the CT in 1 year      Ellen De La Rosa MD FACS

## 2020-05-29 RX ORDER — SULFAMETHOXAZOLE AND TRIMETHOPRIM 800; 160 MG/1; MG/1
TABLET ORAL
Qty: 15 TAB | Refills: 0 | Status: SHIPPED | OUTPATIENT
Start: 2020-05-29 | End: 2020-07-18

## 2020-06-19 ENCOUNTER — VIRTUAL VISIT (OUTPATIENT)
Dept: INTERNAL MEDICINE CLINIC | Age: 80
End: 2020-06-19

## 2020-06-19 DIAGNOSIS — Z90.79 S/P TURP: ICD-10-CM

## 2020-06-19 DIAGNOSIS — Z00.00 MEDICARE ANNUAL WELLNESS VISIT, SUBSEQUENT: ICD-10-CM

## 2020-06-19 DIAGNOSIS — Z95.5 STENTED CORONARY ARTERY: ICD-10-CM

## 2020-06-19 DIAGNOSIS — I25.10 CORONARY ARTERY DISEASE INVOLVING NATIVE HEART WITHOUT ANGINA PECTORIS, UNSPECIFIED VESSEL OR LESION TYPE: Primary | ICD-10-CM

## 2020-06-19 DIAGNOSIS — C61 PROSTATE CANCER (HCC): ICD-10-CM

## 2020-06-19 DIAGNOSIS — F41.9 ANXIETY: ICD-10-CM

## 2020-06-19 RX ORDER — LORAZEPAM 1 MG/1
TABLET ORAL
Qty: 30 TAB | Refills: 5 | Status: SHIPPED | OUTPATIENT
Start: 2020-06-19 | End: 2021-07-16 | Stop reason: SDUPTHER

## 2020-06-19 NOTE — PROGRESS NOTES
Tj Adan is a 79 y.o. male who was seen by synchronous (real-time) audio-video technology on 6/19/2020. Consent: Tj Adan, who was seen by synchronous (real-time) audio-video technology, and/or his healthcare decision maker, is aware that this patient-initiated, Telehealth encounter on 6/19/2020 is a billable service, with coverage as determined by his insurance carrier. He is aware that he may receive a bill and has provided verbal consent to proceed: Yes. Assessment & Plan:   Diagnoses and all orders for this visit:    1. Coronary artery disease involving native heart without angina pectoris, unspecified vessel or lesion type    2. Anxiety  -     LORazepam (ATIVAN) 1 mg tablet; 1 daily prn anxiety    3. Prostate cancer ([facility] [address]    4. Stented coronary artery    5. S/P TURP    6. Medicare annual wellness visit, subsequent        I spent at least 25 minutes on this visit with this established patient. Subjective:   Tj Aadn is a 79 y.o. male who was seen for Coronary Artery Disease and Medication Refill    Patient has a few chronic medical issues including CAD, history of MI with stents, prostate cancer, history of partial nephrectomy, history of TURP, urine incontinence, and chronic anxiety. Med list and most recent studies reviewed. Reports compliance with medications. Takes lorazepam on daily basis for anxiety. Has not done labs as I have recommended the last couple of times. Followed by cardiologist.  Cardiac status has been stable. Followed by urologist.  Fatmata [place] on Lupron injections. Uses pads for incontinence. Denies tobacco or alcohol use. Lives with his wife. Works as a . Reports taking precautions about COVID-19. Denies any related signs or symptoms including fever, cough, dyspnea, chest pain. Needs repeat labs. Needs medication refill. No other new complaints.     Plan:  Refill lorazepam for 6 months  Repeat CMP, CBC, lipids  F/u with other MD's as scheduled  COVID-19 precautions discussed with pt  Follow-up 6 months or as needed  Prior to Admission medications    Medication Sig Start Date End Date Taking? Authorizing Provider   LORazepam (ATIVAN) 1 mg tablet 1 daily prn anxiety 6/19/20  Yes Abdon Kim DO   trimethoprim-sulfamethoxazole (BACTRIM DS, SEPTRA DS) 160-800 mg per tablet TAKE ONE TABLET BY MOUTH ON MONDAY, WEDNESDAY, & FRIDAY FOR UTI PROPHYLAXIS 5/29/20  Yes Abdon Kim DO   fluticasone propionate (FLONASE) 50 mcg/actuation nasal spray 2 Sprays by Both Nostrils route daily. 10/22/19  Yes Abdon Kim DO   lisinopril (PRINIVIL, ZESTRIL) 5 mg tablet Take 1 Tab by mouth daily. 10/22/19  Yes Emily Kim, DO   vit A/vit C/vit E/zinc/copper (PRESERVISION AREDS PO) 2 Tabs. Yes Provider, Historical   Aspirin, Buffered 81 mg tab Take  by mouth. Yes Provider, Historical   bisacodyl (DULCOLAX, BISACODYL,) 5 mg EC tablet Take 5 mg by mouth daily. Indications: CONSTIPATION   Yes Provider, Historical   saw palmetto 160 mg cap Take 1 Cap by mouth two (2) times a day. Yes Provider, Historical   multivitamin (ONE A DAY) tablet Take 1 Tab by mouth daily.    Yes Provider, Historical   LORazepam (ATIVAN) 1 mg tablet 1 daily prn anxiety 10/22/19 6/19/20  Mayda Gutiérrez, DO     Allergies   Allergen Reactions    Latex Other (comments)     Blistering of the skin       Patient Active Problem List    Diagnosis Date Noted    H/O partial nephrectomy 01/16/2017    Rosacea 01/16/2017    S/P TURP 01/29/2016    Other urinary incontinence 01/29/2016    Renal mass 06/24/2015    Stented coronary artery 08/15/2014    Anxiety 08/15/2014    CAD (coronary artery disease) 12/23/2013    Old MI (myocardial infarction) 12/23/2013    Prostate cancer ([facility] [address] 12/23/2013    Macular degeneration 12/23/2013     Current Outpatient Medications   Medication Sig Dispense Refill    LORazepam (ATIVAN) 1 mg tablet 1 daily prn anxiety 30 Tab 5    trimethoprim-sulfamethoxazole (BACTRIM DS, SEPTRA DS) 160-800 mg per tablet TAKE ONE TABLET BY MOUTH ON MONDAY, WEDNESDAY, & FRIDAY FOR UTI PROPHYLAXIS 15 Tab 0    fluticasone propionate (FLONASE) 50 mcg/actuation nasal spray 2 Sprays by Both Nostrils route daily. 1 Bottle 2    lisinopril (PRINIVIL, ZESTRIL) 5 mg tablet Take 1 Tab by mouth daily. 90 Tab 1    vit A/vit C/vit E/zinc/copper (PRESERVISION AREDS PO) 2 Tabs.  Aspirin, Buffered 81 mg tab Take  by mouth.  bisacodyl (DULCOLAX, BISACODYL,) 5 mg EC tablet Take 5 mg by mouth daily. Indications: CONSTIPATION      saw palmetto 160 mg cap Take 1 Cap by mouth two (2) times a day.  multivitamin (ONE A DAY) tablet Take 1 Tab by mouth daily. Allergies   Allergen Reactions    Latex Other (comments)     Blistering of the skin     Past Medical History:   Diagnosis Date    CAD (coronary artery disease)     MI 2004    Cancer [facility])     prostate s/p xrt    Macular degeneration     MI (myocardial infarction) ([facility] [address] 2004     Past Surgical History:   Procedure Laterality Date    CARDIAC SURG PROCEDURE UNLIST      2 Stents    HX APPENDECTOMY  1955    HX CATARACT REMOVAL      bilateral    HX UROLOGICAL  6/24/15    RIGHT ROBOTIC PARTIAL NEPHRECTOMY  (LATEX ALLERGY), renal exploration, cystectomy x 3     Social History     Tobacco Use    Smoking status: Current Every Day Smoker     Packs/day: 0.50     Years: 40.00     Pack years: 20.00    Smokeless tobacco: Never Used    Tobacco comment: 1 pack every 4 days   Substance Use Topics    Alcohol use: No     Alcohol/week: 0.0 standard drinks       ROS    Objective:   Vital Signs: (As obtained by patient/caregiver at home)  There were no vitals taken for this visit.      [INSTRUCTIONS:  \"[x]\" Indicates a positive item  \"[]\" Indicates a negative item  -- DELETE ALL ITEMS NOT EXAMINED]    Constitutional: [x] Appears well-developed and well-nourished [x] No apparent distress      [] Abnormal -     Mental status: [x] Alert and awake  [x] Oriented to person/place/time [x] Able to follow commands    [] Abnormal -     Eyes:   EOM    [x]  Normal    [] Abnormal -   Sclera  [x]  Normal    [] Abnormal -          Discharge [x]  None visible   [] Abnormal -     HENT: [x] Normocephalic, atraumatic  [] Abnormal -   [x] Mouth/Throat: Mucous membranes are moist    External Ears [x] Normal  [] Abnormal -    Neck: [x] No visualized mass [] Abnormal -     Pulmonary/Chest: [x] Respiratory effort normal   [x] No visualized signs of difficulty breathing or respiratory distress        [] Abnormal -      Musculoskeletal:   [x] Normal gait with no signs of ataxia         [x] Normal range of motion of neck        [] Abnormal -     Neurological:        [x] No Facial Asymmetry (Cranial nerve 7 motor function) (limited exam due to video visit)          [x] No gaze palsy        [] Abnormal -          Skin:        [x] No significant exanthematous lesions or discoloration noted on facial skin         [] Abnormal -            Psychiatric:       [x] Normal Affect [] Abnormal -        [x] No Hallucinations    Other pertinent observable physical exam findings:-        We discussed the expected course, resolution and complications of the diagnosis(es) in detail. Medication risks, benefits, costs, interactions, and alternatives were discussed as indicated. I advised him to contact the office if his condition worsens, changes or fails to improve as anticipated. He expressed understanding with the diagnosis(es) and plan. Charlie Ying is a 79 y.o. male who was evaluated by a video visit encounter for concerns as above. Patient identification was verified prior to start of the visit. A caregiver was present when appropriate. Due to this being a TeleHealth encounter (During OZMVY-19 public health emergency), evaluation of the following organ systems was limited: Vitals/Constitutional/EENT/Resp/CV/GI//MS/Neuro/Skin/Heme-Lymph-Imm.   Pursuant to the emergency declaration under the [facility], [address] [place] [address] authority and the [employer] Act, this Virtual  Visit was conducted, with patient's (and/or legal guardian's) consent, to reduce the patient's risk of exposure to COVID-19 and provide necessary medical care. Services were provided through a video synchronous discussion virtually to substitute for in-person clinic visit. Patient and provider were located at their individual homes.       Benoit Strong DO

## 2020-06-19 NOTE — PROGRESS NOTES
Schedule of Personalized Health Plan  (Provide Copy to Patient)  The best way to stay healthy is to live a healthy lifestyle. A healthy lifestyle includes regular exercise, eating a well-balanced diet, keeping a healthy weight and not smoking. Regular physical exams and screening tests are another important way to take care of yourself. Preventive exams provided by health care providers can find health problems early when treatment works best and can keep you from getting certain diseases or illnesses. Preventive services include exams, lab tests, screenings, shots, monitoring and information to help you take care of your own health. All people over 65 should have a pneumonia shot. Pneumonia shots are usually only needed once in a lifetime unless your doctor decides differently. All people over 65 should have a yearly flu shot. People over 65 are at medium to high risk for Hepatitis B. Three shots are needed for complete protection. In addition to your physical exam, some screening tests are recommended:    Bone mass measurement (dexa scan) is recommended every two years if you have certain risk factors, such as personal history of vertebral fracture or chronic steroid medication use    Diabetes Mellitus screening is recommended every year. Glaucoma is an eye disease caused by high pressure in the eye. An eye exam is recommended every year. Cardiovascular screening tests that check your cholesterol and other blood fat (lipid) levels are recommended every five years. Colorectal Cancer screening tests help to find pre-cancerous polyps (growths in the colon) so they can be removed before they turn into cancer. Tests ordered for screening depend on your personal and family history risk factors.     Screening for Prostate Cancer is recommended yearly with a digital rectal exam and/or a PSA test    Here is a list of your current Health Maintenance items with a due date:  Health Maintenance Topic Date Due    DTaP/Tdap/Td series (1 - Tdap) 02/01/1962    Shingrix Vaccine Age 50> (1 of 2) 02/01/1991    GLAUCOMA SCREENING Q2Y  02/01/2006    Pneumococcal 65+ years (1 of 1 - PPSV23) 02/01/2006    Influenza Age 5 to Adult  08/01/2020    Medicare Yearly Exam  06/20/2021       This is the Subsequent Medicare Annual Wellness Exam, performed 12 months or more after the Initial AWV or the last Subsequent AWV    I have reviewed the patient's medical history in detail and updated the computerized patient record. History     Patient Active Problem List   Diagnosis Code    CAD (coronary artery disease) I25.10    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    Prostate cancer ([facility] [address] C61    Macular degeneration H35.30    Stented coronary artery Z95.5    Anxiety F41.9    Renal mass N28.89    S/P TURP Z90.79    Other urinary incontinence N39.498    H/O partial nephrectomy Z90.5    Rosacea L71.9     Past Medical History:   Diagnosis Date    CAD (coronary artery disease)     MI 2004    Cancer [facility])     prostate s/p xrt    Macular degeneration     MI (myocardial infarction) ([facility] [address] 2004      Past Surgical History:   Procedure Laterality Date    CARDIAC SURG PROCEDURE UNLIST      2 Stents    HX APPENDECTOMY  1955    HX CATARACT REMOVAL      bilateral    HX UROLOGICAL  6/24/15    RIGHT ROBOTIC PARTIAL NEPHRECTOMY  (LATEX ALLERGY), renal exploration, cystectomy x 3     Current Outpatient Medications   Medication Sig Dispense Refill    LORazepam (ATIVAN) 1 mg tablet 1 daily prn anxiety 30 Tab 5    trimethoprim-sulfamethoxazole (BACTRIM DS, SEPTRA DS) 160-800 mg per tablet TAKE ONE TABLET BY MOUTH ON MONDAY, WEDNESDAY, & FRIDAY FOR UTI PROPHYLAXIS 15 Tab 0    fluticasone propionate (FLONASE) 50 mcg/actuation nasal spray 2 Sprays by Both Nostrils route daily. 1 Bottle 2    lisinopril (PRINIVIL, ZESTRIL) 5 mg tablet Take 1 Tab by mouth daily.  90 Tab 1    vit A/vit C/vit E/zinc/copper (PRESERVISION AREDS PO) 2 Tabs.  Aspirin, Buffered 81 mg tab Take  by mouth.  bisacodyl (DULCOLAX, BISACODYL,) 5 mg EC tablet Take 5 mg by mouth daily. Indications: CONSTIPATION      saw palmetto 160 mg cap Take 1 Cap by mouth two (2) times a day.  multivitamin (ONE A DAY) tablet Take 1 Tab by mouth daily. Allergies   Allergen Reactions    Latex Other (comments)     Blistering of the skin       Family History   Problem Relation Age of Onset    Alzheimer Mother    [facility] Cancer Father         colon    No Known Problems Sister     No Known Problems Sister     No Known Problems Sister      Social History     Tobacco Use    Smoking status: Current Every Day Smoker     Packs/day: 0.50     Years: 40.00     Pack years: 20.00    Smokeless tobacco: Never Used    Tobacco comment: 1 pack every 4 days   Substance Use Topics    Alcohol use: No     Alcohol/week: 0.0 standard drinks       Depression Risk Factor Screening:     3 most recent PHQ Screens 6/19/2020   Little interest or pleasure in doing things Not at all   Feeling down, depressed, irritable, or hopeless Not at all   Total Score PHQ 2 0       Alcohol Risk Factor Screening (MALE > 65): Do you average more 1 drink per night or more than 7 drinks a week: No    In the past three months have you have had more than 4 drinks containing alcohol on one occasion: No      Functional Ability and Level of Safety:   Hearing: Hearing is good. Activities of Daily Living: The home contains: no safety equipment. Patient does total self care     Ambulation: with no difficulty     Fall Risk:  Fall Risk Assessment, last 12 mths 6/19/2020   Able to walk? Yes   Fall in past 12 months?  No     Abuse Screen:  Patient is not abused       Cognitive Screening   Has your family/caregiver stated any concerns about your memory: no     Cognitive Screening: A+O x 3    Patient Care Team   Patient Care Team:  Loretta Toledo DO as PCP - General (Internal Medicine)  Loretta Toledo DO as PCP [facility]  Richi Slaughter MD as Surgeon (General Surgery)    Assessment/Plan   Education and counseling provided:  Are appropriate based on today's review and evaluation    Diagnoses and all orders for this visit:    1. Coronary artery disease involving native heart without angina pectoris, unspecified vessel or lesion type    2. Anxiety  -     LORazepam (ATIVAN) 1 mg tablet; 1 daily prn anxiety    3. Prostate cancer ([facility] [address]    4. Stented coronary artery    5. S/P TURP    6.  Medicare annual wellness visit, subsequent        Health Maintenance Due   Topic Date Due    DTaP/Tdap/Td series (1 - Tdap) 02/01/1962    Shingrix Vaccine Age 50> (1 of 2) 02/01/1991    GLAUCOMA SCREENING Q2Y  02/01/2006    Pneumococcal 65+ years (1 of 1 - PPSV23) 02/01/2006

## 2020-06-19 NOTE — PROGRESS NOTES
Health Maintenance Due   Topic Date Due    DTaP/Tdap/Td series (1 - Tdap) 02/01/1962    Shingrix Vaccine Age 50> (1 of 2) 02/01/1991    GLAUCOMA SCREENING Q2Y  02/01/2006    Pneumococcal 65+ years (1 of 1 - PPSV23) 02/01/2006    Medicare Yearly Exam  01/05/2020       Chief Complaint   Patient presents with    Coronary Artery Disease    Medication Refill       1. Have you been to the ER, urgent care clinic since your last visit? Hospitalized since your last visit? No    2. Have you seen or consulted any other health care providers outside of the [address] [place] [address] since your last visit? Include any pap smears or colon screening. No    3) Do you have an Advance Directive on file? no    4) Are you interested in receiving information on Advance Directives? NO      Patient is accompanied by self I have received verbal consent from [address] [place] [address] to discuss any/all medical information while they are present in the room.

## 2020-09-01 RX ORDER — LISINOPRIL 5 MG/1
TABLET ORAL
Qty: 90 TAB | Refills: 0 | Status: SHIPPED | OUTPATIENT
Start: 2020-09-01 | End: 2020-12-21 | Stop reason: SDUPTHER

## 2020-12-22 RX ORDER — LISINOPRIL 5 MG/1
TABLET ORAL
Qty: 90 TAB | Refills: 0 | Status: SHIPPED | OUTPATIENT
Start: 2020-12-22 | End: 2021-06-11 | Stop reason: SDUPTHER

## 2021-06-11 ENCOUNTER — VIRTUAL VISIT (OUTPATIENT)
Dept: INTERNAL MEDICINE CLINIC | Age: 81
End: 2021-06-11
Payer: MEDICARE

## 2021-06-11 DIAGNOSIS — F41.9 ANXIETY: ICD-10-CM

## 2021-06-11 DIAGNOSIS — I10 HYPERTENSION, UNSPECIFIED TYPE: Primary | ICD-10-CM

## 2021-06-11 DIAGNOSIS — I25.10 CORONARY ARTERY DISEASE INVOLVING NATIVE CORONARY ARTERY OF NATIVE HEART WITHOUT ANGINA PECTORIS: ICD-10-CM

## 2021-06-11 PROCEDURE — 99442 PR PHYS/QHP TELEPHONE EVALUATION 11-20 MIN: CPT | Performed by: INTERNAL MEDICINE

## 2021-06-11 RX ORDER — LISINOPRIL 5 MG/1
TABLET ORAL
Qty: 90 TABLET | Refills: 0 | Status: SHIPPED | OUTPATIENT
Start: 2021-06-11 | End: 2021-09-10

## 2021-06-11 NOTE — PROGRESS NOTES
Saulo Salcedo is a [de-identified] y.o. male, evaluated via audio-only technology on 6/11/2021 for Medication Refill and Hypertension  . Assessment & Plan:   Diagnoses and all orders for this visit:    1. Hypertension, unspecified type    2. Coronary artery disease involving native coronary artery of native heart without angina pectoris  -     lisinopriL (PRINIVIL, ZESTRIL) 5 mg tablet; Take 1 tablet by mouth once daily    3. Anxiety        12  Subjective:     Patient was spoken to via phone for a follow up. Patient reports that he needs a medications refill on his BP medications. Continues to take them daily, lisinopril 5 mg. Reports that his BP usually runs in the 100's  Or mildly higher. Reports no CP, SOB, of leg swelling. Continues to see cardiology regularly. Reports his next visit in the next few months. Reports no new changes. Has been limiting his salt intake   Prior to Admission medications    Medication Sig Start Date End Date Taking? Authorizing Provider   lisinopriL (PRINIVIL, ZESTRIL) 5 mg tablet Take 1 tablet by mouth once daily 6/11/21  Yes Jessica Zarco NP   vit A/vit C/vit E/zinc/copper (PRESERVISION AREDS PO) 2 Tabs. Yes Provider, Historical   Aspirin, Buffered 81 mg tab Take  by mouth. Yes Provider, Historical   bisacodyl (DULCOLAX, BISACODYL,) 5 mg EC tablet Take 5 mg by mouth daily. Indications: CONSTIPATION   Yes Provider, Historical   saw palmetto 160 mg cap Take 1 Cap by mouth two (2) times a day. Yes Provider, Historical   multivitamin (ONE A DAY) tablet Take 1 Tab by mouth daily.    Yes Provider, Historical   lisinopriL (PRINIVIL, ZESTRIL) 5 mg tablet Take 1 tablet by mouth once daily 12/22/20 6/11/21  Jessica Zarco NP   trimethoprim-sulfamethoxazole (BACTRIM DS, SEPTRA DS) 160-800 mg per tablet TAKE 1 TABLET BY MOUTH ON MONDAY, WEDNESDAY & FRIDAY FOR UTI PROPHYLAXIS 7/18/20 6/11/21  Osiris Mcneil, DO   LORazepam (ATIVAN) 1 mg tablet 1 daily prn anxiety  Patient not taking: Reported on 6/11/2021 6/19/20   Ty Elaine DO   fluticasone propionate (FLONASE) 50 mcg/actuation nasal spray 2 Sprays by Both Nostrils route daily. Patient not taking: Reported on 6/11/2021 10/22/19 6/11/21  Ty Elaine DO     Patient Active Problem List   Diagnosis Code    CAD (coronary artery disease) I25.10    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    Prostate cancer ([facility] [address] C61    Macular degeneration H35.30    Stented coronary artery Z95.5    Anxiety F41.9    Renal mass N28.89    S/P TURP Z90.79    Other urinary incontinence N39.498    H/O partial nephrectomy Z90.5    Rosacea L71.9     Patient Active Problem List    Diagnosis Date Noted    H/O partial nephrectomy 01/16/2017    Rosacea 01/16/2017    S/P TURP 01/29/2016    Other urinary incontinence 01/29/2016    Renal mass 06/24/2015    Stented coronary artery 08/15/2014    Anxiety 08/15/2014    CAD (coronary artery disease) 12/23/2013    Old MI (myocardial infarction) 12/23/2013    Prostate cancer ([facility] [address] 12/23/2013    Macular degeneration 12/23/2013     Current Outpatient Medications   Medication Sig Dispense Refill    lisinopriL (PRINIVIL, ZESTRIL) 5 mg tablet Take 1 tablet by mouth once daily 90 Tablet 0    vit A/vit C/vit E/zinc/copper (PRESERVISION AREDS PO) 2 Tabs.  Aspirin, Buffered 81 mg tab Take  by mouth.  bisacodyl (DULCOLAX, BISACODYL,) 5 mg EC tablet Take 5 mg by mouth daily. Indications: CONSTIPATION      saw palmetto 160 mg cap Take 1 Cap by mouth two (2) times a day.  multivitamin (ONE A DAY) tablet Take 1 Tab by mouth daily.       LORazepam (ATIVAN) 1 mg tablet 1 daily prn anxiety (Patient not taking: Reported on 6/11/2021) 30 Tab 5     Allergies   Allergen Reactions    Latex Other (comments)     Blistering of the skin     Past Medical History:   Diagnosis Date    CAD (coronary artery disease)     MI 2004    Cancer [facility])     prostate s/p xrt    Macular degeneration     MI (myocardial infarction) [facility]) 2004     Past Surgical History:   Procedure Laterality Date    CARDIAC SURG PROCEDURE UNLIST      2 Stents    HX APPENDECTOMY  1955    HX CATARACT REMOVAL      bilateral    HX UROLOGICAL  6/24/15    RIGHT ROBOTIC PARTIAL NEPHRECTOMY  (LATEX ALLERGY), renal exploration, cystectomy x 3     Family History   Problem Relation Age of Onset    Alzheimer Mother     Cancer Father         colon    No Known Problems Sister     No Known Problems Sister     No Known Problems Sister      Social History     Tobacco Use    Smoking status: Current Every Day Smoker     Packs/day: 0.50     Years: 40.00     Pack years: 20.00    Smokeless tobacco: Never Used    Tobacco comment: 1 pack every 4 days   Substance Use Topics    Alcohol use: No     Alcohol/week: 0.0 standard drinks       Review of Systems   Constitutional: Negative. Eyes: Negative. Respiratory: Negative. Cardiovascular: Negative. Gastrointestinal: Negative. Musculoskeletal: Positive for joint pain. Neurological: Negative. Patient-Reported Vitals 6/19/2020   Patient-Reported Weight 190lb   Patient-Reported Height 6f        Lev [place] Nan, who was evaluated through a patient-initiated, synchronous (real-time) audio only encounter, and/or her healthcare decision maker, is aware that it is a billable service, with coverage as determined by his insurance carrier. He provided verbal consent to proceed: Yes. He has not had a related appointment within my department in the past 7 days or scheduled within the next 24 hours.       Total Time: minutes: 11-20 minutes    Gerardo Raphael NP

## 2021-06-11 NOTE — PROGRESS NOTES
Health Maintenance Due   Topic Date Due    DTaP/Tdap/Td series (1 - Tdap) Never done    Shingrix Vaccine Age 50> (1 of 2) Never done    Pneumococcal 65+ years (1 of 1 - PPSV23) Never done    Medicare Yearly Exam  06/20/2021       Chief Complaint   Patient presents with    Medication Refill    Hypertension       1. Have you been to the ER, urgent care clinic since your last visit? Hospitalized since your last visit? No    2. Have you seen or consulted any other health care providers outside of the [address] [place] [address] since your last visit? Include any pap smears or colon screening. No    3) Do you have an Advance Directive on file? no    4) Are you interested in receiving information on Advance Directives? NO      Patient is accompanied by self I have received verbal consent from [address] [place] [address] to discuss any/all medical information while they are present in the room.

## 2021-07-16 ENCOUNTER — OFFICE VISIT (OUTPATIENT)
Dept: INTERNAL MEDICINE CLINIC | Age: 81
End: 2021-07-16
Payer: MEDICARE

## 2021-07-16 VITALS
SYSTOLIC BLOOD PRESSURE: 122 MMHG | BODY MASS INDEX: 24.52 KG/M2 | WEIGHT: 181 LBS | HEART RATE: 73 BPM | RESPIRATION RATE: 20 BRPM | HEIGHT: 72 IN | TEMPERATURE: 98.3 F | OXYGEN SATURATION: 96 % | DIASTOLIC BLOOD PRESSURE: 72 MMHG

## 2021-07-16 DIAGNOSIS — F41.9 ANXIETY: ICD-10-CM

## 2021-07-16 DIAGNOSIS — C61 PROSTATE CANCER (HCC): ICD-10-CM

## 2021-07-16 DIAGNOSIS — Z00.00 MEDICARE ANNUAL WELLNESS VISIT, SUBSEQUENT: ICD-10-CM

## 2021-07-16 DIAGNOSIS — Z90.5 H/O PARTIAL NEPHRECTOMY: ICD-10-CM

## 2021-07-16 DIAGNOSIS — I25.10 CORONARY ARTERY DISEASE INVOLVING NATIVE CORONARY ARTERY OF NATIVE HEART WITHOUT ANGINA PECTORIS: Primary | ICD-10-CM

## 2021-07-16 DIAGNOSIS — Z95.5 STENTED CORONARY ARTERY: ICD-10-CM

## 2021-07-16 DIAGNOSIS — Z90.79 S/P TURP: ICD-10-CM

## 2021-07-16 PROCEDURE — G8752 SYS BP LESS 140: HCPCS | Performed by: INTERNAL MEDICINE

## 2021-07-16 PROCEDURE — G8420 CALC BMI NORM PARAMETERS: HCPCS | Performed by: INTERNAL MEDICINE

## 2021-07-16 PROCEDURE — 1101F PT FALLS ASSESS-DOCD LE1/YR: CPT | Performed by: INTERNAL MEDICINE

## 2021-07-16 PROCEDURE — G8754 DIAS BP LESS 90: HCPCS | Performed by: INTERNAL MEDICINE

## 2021-07-16 PROCEDURE — G0439 PPPS, SUBSEQ VISIT: HCPCS | Performed by: INTERNAL MEDICINE

## 2021-07-16 PROCEDURE — G8536 NO DOC ELDER MAL SCRN: HCPCS | Performed by: INTERNAL MEDICINE

## 2021-07-16 PROCEDURE — G8510 SCR DEP NEG, NO PLAN REQD: HCPCS | Performed by: INTERNAL MEDICINE

## 2021-07-16 PROCEDURE — 99214 OFFICE O/P EST MOD 30 MIN: CPT | Performed by: INTERNAL MEDICINE

## 2021-07-16 PROCEDURE — G8427 DOCREV CUR MEDS BY ELIG CLIN: HCPCS | Performed by: INTERNAL MEDICINE

## 2021-07-16 RX ORDER — ABIRATERONE ACETATE 250 MG/1
TABLET ORAL
COMMUNITY
Start: 2021-07-02

## 2021-07-16 RX ORDER — LORAZEPAM 1 MG/1
TABLET ORAL
Qty: 30 TABLET | Refills: 5 | Status: SHIPPED | OUTPATIENT
Start: 2021-07-16 | End: 2022-06-07 | Stop reason: SDUPTHER

## 2021-07-16 RX ORDER — PREDNISONE 5 MG/1
TABLET ORAL
COMMUNITY
Start: 2021-07-02

## 2021-07-16 NOTE — PROGRESS NOTES
Health Maintenance Due   Topic Date Due    DTaP/Tdap/Td series (1 - Tdap) Never done    Shingrix Vaccine Age 50> (1 of 2) Never done    Pneumococcal 65+ years (1 of 1 - PPSV23) Never done    Medicare Yearly Exam  06/20/2021       Chief Complaint   Patient presents with    Annual Wellness Visit    Hypertension    Anxiety       1. Have you been to the ER, urgent care clinic since your last visit? Hospitalized since your last visit? No    2. Have you seen or consulted any other health care providers outside of the [address] [place] [address] since your last visit? Include any pap smears or colon screening. No    3) Do you have an Advance Directive on file? no    4) Are you interested in receiving information on Advance Directives? NO      Patient is accompanied by self I have received verbal consent from [address] [place] [address] to discuss any/all medical information while they are present in the room.

## 2021-07-16 NOTE — PROGRESS NOTES
HISTORY OF PRESENT ILLNESS  Jeremias Cardenas is a [de-identified] y.o. male. Pt. comes in for f/u. Has a few chronic medical issues as documented. Vital signs are stable. History of CAD with stents. Denies any related issues. Followed by cardiologist.  History of prostate cancer. Followed by urologist and remains on medications. Reports PSA being very low. Has urine urgency incontinence because of previous TURP. Has chronic anxiety. Takes lorazepam as needed. Stressed because of wife's declining health condition. Denies tobacco or alcohol use. Has had Covid vaccination. Denies any related signs of symptoms. PMH/PSH/Allergies/Social History/medication list and most recent studies reviewed with patient. Tobacco use: No  Alcohol use: Social    Reports compliance with medications and diet. Trying to be active physically to control weight. Reports no other new c/o. HPI    Review of Systems   Constitutional: Negative. HENT: Negative. Eyes: Negative. Respiratory: Negative for shortness of breath. Cardiovascular: Negative for chest pain and leg swelling. Gastrointestinal: Negative for abdominal pain. Genitourinary: Positive for urgency. Negative for dysuria and hematuria. Musculoskeletal: Negative for joint pain. Skin: Negative. Neurological: Negative for dizziness, sensory change, focal weakness and headaches. Endo/Heme/Allergies: Negative. Psychiatric/Behavioral: Negative for depression. The patient is nervous/anxious. All other systems reviewed and are negative. Physical Exam  Vitals and nursing note reviewed. Constitutional:       General: He is not in acute distress. Appearance: He is well-developed. Comments: pleasant   HENT:      Head: Normocephalic and atraumatic. Mouth/Throat:      Mouth: Mucous membranes are moist.   Eyes:      General: No scleral icterus. Conjunctiva/sclera: Conjunctivae normal.   Neck:      Thyroid: No thyromegaly.       Vascular: No JVD.   Cardiovascular:      Rate and Rhythm: Normal rate and regular rhythm. Pulses: Normal pulses. Heart sounds: Normal heart sounds. No murmur heard. Pulmonary:      Effort: Pulmonary effort is normal. No respiratory distress. Breath sounds: Normal breath sounds. No wheezing or rales. Abdominal:      General: Bowel sounds are normal. There is no distension. Palpations: Abdomen is soft. Tenderness: There is no abdominal tenderness. There is no right CVA tenderness or left CVA tenderness. Musculoskeletal:         General: No tenderness. Cervical back: Normal range of motion and neck supple. Right lower leg: No edema. Left lower leg: No edema. Skin:     General: Skin is warm and dry. Findings: Erythema (nose and face c/w rosacea) present. No rash. Neurological:      Mental Status: He is alert and oriented to person, place, and time. Coordination: Coordination normal.      Gait: Gait normal.   Psychiatric:         Behavior: Behavior normal.      Comments: Seems distressed/anxious         ASSESSMENT and PLAN  Diagnoses and all orders for this visit:    1. Coronary artery disease involving native coronary artery of native heart without angina pectoris  -     LIPID PANEL; Future  -     METABOLIC PANEL, COMPREHENSIVE; Future  -     CBC W/O DIFF; Future    2. Prostate cancer ([facility] [address]    3. H/O partial nephrectomy    4. Stented coronary artery  -     LIPID PANEL; Future  -     METABOLIC PANEL, COMPREHENSIVE; Future  -     CBC W/O DIFF; Future    5. Anxiety  -     LORazepam (ATIVAN) 1 mg tablet; 1 daily prn anxiety    6. S/P TURP    7. Medicare annual wellness visit, subsequent      Follow-up and Dispositions    · Return in about 6 months (around 1/16/2022).      lab results and schedule of future lab studies reviewed with patient  reviewed diet, exercise and weight control  reviewed medications and side effects in detail  F/u with other MD's as scheduled  COVID-19 precautions discussed with pt

## 2021-07-16 NOTE — PROGRESS NOTES
Schedule of Personalized Health Plan  (Provide Copy to Patient)  The best way to stay healthy is to live a healthy lifestyle. A healthy lifestyle includes regular exercise, eating a well-balanced diet, keeping a healthy weight and not smoking. Regular physical exams and screening tests are another important way to take care of yourself. Preventive exams provided by health care providers can find health problems early when treatment works best and can keep you from getting certain diseases or illnesses. Preventive services include exams, lab tests, screenings, shots, monitoring and information to help you take care of your own health. All people over 65 should have a pneumonia shot. Pneumonia shots are usually only needed once in a lifetime unless your doctor decides differently. All people over 65 should have a yearly flu shot. People over 65 are at medium to high risk for Hepatitis B. Three shots are needed for complete protection. In addition to your physical exam, some screening tests are recommended:    Bone mass measurement (dexa scan) is recommended every two years if you have certain risk factors, such as personal history of vertebral fracture or chronic steroid medication use    Diabetes Mellitus screening is recommended every year. Glaucoma is an eye disease caused by high pressure in the eye. An eye exam is recommended every year. Cardiovascular screening tests that check your cholesterol and other blood fat (lipid) levels are recommended every five years. Colorectal Cancer screening tests help to find pre-cancerous polyps (growths in the colon) so they can be removed before they turn into cancer. Tests ordered for screening depend on your personal and family history risk factors.     Screening for Prostate Cancer is recommended yearly with a digital rectal exam and/or a PSA test    Here is a list of your current Health Maintenance items with a due date:  Health Maintenance Topic Date Due    DTaP/Tdap/Td series (1 - Tdap) Never done    Shingrix Vaccine Age 50> (1 of 2) Never done    LDCT Smoker 30 Pack Years  Never done    Pneumococcal 65+ years (1 of 1 - PPSV23) Never done    Flu Vaccine (1) 09/01/2021    Medicare Yearly Exam  07/17/2022    COVID-19 Vaccine  Completed       This is the Subsequent Medicare Annual Wellness Exam, performed 12 months or more after the Initial AWV or the last Subsequent AWV    I have reviewed the patient's medical history in detail and updated the computerized patient record. Assessment/Plan   Education and counseling provided:  Are appropriate based on today's review and evaluation    1. Coronary artery disease involving native coronary artery of native heart without angina pectoris  -     LIPID PANEL; Future  -     METABOLIC PANEL, COMPREHENSIVE; Future  -     CBC W/O DIFF; Future  2. Prostate cancer ([facility] [address]  3. H/O partial nephrectomy  4. Stented coronary artery  -     LIPID PANEL; Future  -     METABOLIC PANEL, COMPREHENSIVE; Future  -     CBC W/O DIFF; Future  5. Anxiety  -     LORazepam (ATIVAN) 1 mg tablet; 1 daily prn anxiety, Normal, Disp-30 Tablet, R-5  6. S/P TURP       Depression Risk Factor Screening     3 most recent PHQ Screens 7/16/2021   Little interest or pleasure in doing things Not at all   Feeling down, depressed, irritable, or hopeless Not at all   Total Score PHQ 2 0       Alcohol Risk Screen    Do you average more than 1 drink per night or more than 7 drinks a week: No    In the past three months have you have had more than 4 drinks containing alcohol on one occasion: No        Functional Ability and Level of Safety    Hearing: Hearing is good. Activities of Daily Living: The home contains: no safety equipment. Patient does total self care      Ambulation: with no difficulty     Fall Risk:  Fall Risk Assessment, last 12 mths 7/16/2021   Able to walk? Yes   Fall in past 12 months? 0   Do you feel unsteady?  0   Are you worried about falling 0      Abuse Screen:  Patient is not abused       Cognitive Screening    Has your family/caregiver stated any concerns about your memory: no     Cognitive Screening: A+O x 3    Health Maintenance Due     Health Maintenance Due   Topic Date Due    DTaP/Tdap/Td series (1 - Tdap) Never done    Shingrix Vaccine Age 50> (1 of 2) Never done    LDCT Smoker 30 Pack Years  Never done    Pneumococcal 65+ years (1 of 1 - PPSV23) Never done       Patient Care Team   Patient Care Team:  Dhruv Enriquez DO as PCP - General (Internal Medicine)  [facility]hru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 [address] C61    Macular degeneration H35.30    Stented coronary artery Z95.5    Anxiety F41.9    Renal mass N28.89    S/P TURP Z90.79    Other urinary incontinence N39.498    H/O partial nephrectomy Z90.5    Rosacea L71.9     Past Medical History:   Diagnosis Date    CAD (coronary artery disease)     MI 2004    Cancer [facility])     prostate s/p xrt    Macular degeneration     MI (myocardial infarction) ([facility] [address] 2004      Past Surgical History:   Procedure Laterality Date    HX APPENDECTOMY  1955    HX CATARACT REMOVAL      bilateral    HX UROLOGICAL  6/24/15    RIGHT ROBOTIC PARTIAL NEPHRECTOMY  (LATEX ALLERGY), renal exploration, cystectomy x 3    [place] CARDIAC SURG PROCEDURE UNLIST      2 Stents     Current Outpatient Medications   Medication Sig Dispense Refill    predniSONE (DELTASONE) 5 mg tablet TAKE 1 TABLET BY MOUTH ONCE DAILY WITH FOOD      abiraterone 250 mg tab TAKE 1 TABLET BY MOUTH ONCE DAILY WITH A LOW FAT BREAKFAST      LORazepam (ATIVAN) 1 mg tablet 1 daily prn anxiety 30 Tablet 5    lisinopriL (PRINIVIL, ZESTRIL) 5 mg tablet Take 1 tablet by mouth once daily 90 Tablet 0  vit A/vit C/vit E/zinc/copper (PRESERVISION AREDS PO) 2 Tabs.  Aspirin, Buffered 81 mg tab Take  by mouth.  bisacodyl (DULCOLAX, BISACODYL,) 5 mg EC tablet Take 5 mg by mouth daily. Indications: CONSTIPATION      saw palmetto 160 mg cap Take 1 Cap by mouth two (2) times a day.  multivitamin (ONE A DAY) tablet Take 1 Tab by mouth daily.        Allergies   Allergen Reactions    Latex Other (comments)     Blistering of the skin       Family History   Problem Relation Age of Onset    Alzheimer Mother    Shantel Brunner Cancer Father         colon    No Known Problems Sister     No Known Problems Sister     No Known Problems Sister      Social History     Tobacco Use    Smoking status: Current Every Day Smoker     Packs/day: 0.50     Years: 40.00     Pack years: 20.00    Smokeless tobacco: Never Used    Tobacco comment: 1 pack every 4 days   Substance Use Topics    Alcohol use: No     Alcohol/week: 0.0 standard drinks         Abdon Kim DO

## 2021-09-10 DIAGNOSIS — I25.10 CORONARY ARTERY DISEASE INVOLVING NATIVE CORONARY ARTERY OF NATIVE HEART WITHOUT ANGINA PECTORIS: ICD-10-CM

## 2021-09-10 RX ORDER — LISINOPRIL 5 MG/1
TABLET ORAL
Qty: 90 TABLET | Refills: 0 | Status: SHIPPED | OUTPATIENT
Start: 2021-09-10 | End: 2021-12-20

## 2021-12-19 DIAGNOSIS — I25.10 CORONARY ARTERY DISEASE INVOLVING NATIVE CORONARY ARTERY OF NATIVE HEART WITHOUT ANGINA PECTORIS: ICD-10-CM

## 2021-12-20 RX ORDER — LISINOPRIL 5 MG/1
TABLET ORAL
Qty: 90 TABLET | Refills: 0 | Status: SHIPPED | OUTPATIENT
Start: 2021-12-20 | End: 2022-03-22 | Stop reason: SDUPTHER

## 2022-03-19 PROBLEM — L71.9 ROSACEA: Status: ACTIVE | Noted: 2017-01-16

## 2022-03-19 PROBLEM — Z90.5 H/O PARTIAL NEPHRECTOMY: Status: ACTIVE | Noted: 2017-01-16

## 2022-03-22 ENCOUNTER — OFFICE VISIT (OUTPATIENT)
Dept: INTERNAL MEDICINE CLINIC | Age: 82
End: 2022-03-22
Payer: MEDICARE

## 2022-03-22 VITALS
TEMPERATURE: 97.5 F | DIASTOLIC BLOOD PRESSURE: 74 MMHG | SYSTOLIC BLOOD PRESSURE: 136 MMHG | OXYGEN SATURATION: 97 % | BODY MASS INDEX: 24.52 KG/M2 | WEIGHT: 181 LBS | HEART RATE: 74 BPM | RESPIRATION RATE: 20 BRPM | HEIGHT: 72 IN

## 2022-03-22 DIAGNOSIS — H35.30 MACULAR DEGENERATION OF BOTH EYES, UNSPECIFIED TYPE: ICD-10-CM

## 2022-03-22 DIAGNOSIS — Z90.5 H/O PARTIAL NEPHRECTOMY: ICD-10-CM

## 2022-03-22 DIAGNOSIS — L98.9 FACE LESION: ICD-10-CM

## 2022-03-22 DIAGNOSIS — Z95.5 STENTED CORONARY ARTERY: ICD-10-CM

## 2022-03-22 DIAGNOSIS — I10 PRIMARY HYPERTENSION: ICD-10-CM

## 2022-03-22 DIAGNOSIS — I25.10 CORONARY ARTERY DISEASE INVOLVING NATIVE CORONARY ARTERY OF NATIVE HEART WITHOUT ANGINA PECTORIS: Primary | ICD-10-CM

## 2022-03-22 DIAGNOSIS — C61 PROSTATE CANCER (HCC): ICD-10-CM

## 2022-03-22 DIAGNOSIS — F41.9 ANXIETY: ICD-10-CM

## 2022-03-22 PROCEDURE — G8420 CALC BMI NORM PARAMETERS: HCPCS | Performed by: INTERNAL MEDICINE

## 2022-03-22 PROCEDURE — G8754 DIAS BP LESS 90: HCPCS | Performed by: INTERNAL MEDICINE

## 2022-03-22 PROCEDURE — G8427 DOCREV CUR MEDS BY ELIG CLIN: HCPCS | Performed by: INTERNAL MEDICINE

## 2022-03-22 PROCEDURE — G8536 NO DOC ELDER MAL SCRN: HCPCS | Performed by: INTERNAL MEDICINE

## 2022-03-22 PROCEDURE — G8510 SCR DEP NEG, NO PLAN REQD: HCPCS | Performed by: INTERNAL MEDICINE

## 2022-03-22 PROCEDURE — 1101F PT FALLS ASSESS-DOCD LE1/YR: CPT | Performed by: INTERNAL MEDICINE

## 2022-03-22 PROCEDURE — G8752 SYS BP LESS 140: HCPCS | Performed by: INTERNAL MEDICINE

## 2022-03-22 PROCEDURE — 99214 OFFICE O/P EST MOD 30 MIN: CPT | Performed by: INTERNAL MEDICINE

## 2022-03-22 RX ORDER — LISINOPRIL 5 MG/1
5 TABLET ORAL DAILY
Qty: 90 TABLET | Refills: 1 | Status: SHIPPED | OUTPATIENT
Start: 2022-03-22 | End: 2022-10-24

## 2022-03-22 NOTE — PROGRESS NOTES
ADVISED PATIENT OF THE FOLLOWING HEALTH MAINTAINCE DUE  Health Maintenance Due   Topic Date Due    DTaP/Tdap/Td series (1 - Tdap) Never done    Shingrix Vaccine Age 50> (1 of 2) Never done    Pneumococcal 65+ years (1 of 1 - PPSV23) Never done    COVID-19 Vaccine (3 - Booster for Pfizer series) 08/10/2021    Flu Vaccine (1) Never done      Chief Complaint   Patient presents with    Coronary Artery Disease    Anxiety    Prostate Cancer       1. Have you been to the ER, urgent care clinic since your last visit? Hospitalized since your last visit? No    2. Have you seen or consulted any other health care providers outside of the [address] [place] [address] since your last visit? Include any DEXA scan, mammography  or colon screening. No    3. Do you have an Advance Directive on file? no    4. Do you have a DNR on file? no    Patient is accompanied by self I have received verbal consent from [address] [place] [address] to discuss any/all medical information while they are present in the room.       Advance Care Planning 6/24/2015   Patient's Healthcare Decision Maker is: Verbal report of preferred healthcare decision maker   Primary Decision Maker Name Sheryl Benavidez   Primary Decision Maker Relationship to Patient Spouse   Confirm Advance Directive None   Patient Would Like to Complete Advance Directive No         [address] [facility], [facility] [address] [id number]  Phone: [phone] Fax: [phone]

## 2022-03-24 PROBLEM — I10 PRIMARY HYPERTENSION: Status: ACTIVE | Noted: 2022-03-22

## 2022-03-30 ENCOUNTER — TELEPHONE (OUTPATIENT)
Dept: INTERNAL MEDICINE CLINIC | Age: 82
End: 2022-03-30

## 2022-03-30 DIAGNOSIS — L98.9 FACE LESION: Primary | ICD-10-CM

## 2022-03-30 NOTE — TELEPHONE ENCOUNTER
Dr. Weinstein Self office does not accept patients insurance. Pt is requesting another referral to someone else who is highly recommended by Dr. Ronal Payton. Pt is requesting a telephone call when new referral is placed.

## 2022-03-31 NOTE — TELEPHONE ENCOUNTER
Spoke w/ pt and referred him to Dr. Tracy Cristobal and Dr. Tracy Guzman. Advised to call and make an appt and hoping that one of the offices accept his insurance.

## 2022-04-01 NOTE — PROGRESS NOTES
HISTORY OF PRESENT ILLNESS  Kentrell Zavala is a 81 y.o. male. Pt. comes in for f/u. Has a few chronic medical issues as documented. Vital signs are stable. BMI is 24.6. History of CAD with stents. Denies any related issues. Followed by cardiologist.    History of prostate cancer. Followed by urologist and remains on medications. Reports PSA being very low. Has urine urgency incontinence because of previous TURP. Has chronic anxiety. Takes lorazepam as needed. Stressed because of wife's declining health condition. Denies tobacco or alcohol use. Has had Covid vaccination. Denies any related signs of symptoms. PMH/PSH/Allergies/Social History/medication list and most recent studies reviewed with patient. Tobacco use: No  Alcohol use: Social  Reports compliance with medications and diet. Trying to be active physically to control weight. Reports no other new c/o. HPI    Review of Systems   Constitutional: Negative. HENT: Negative. Eyes: Negative. Respiratory: Negative for shortness of breath. Cardiovascular: Negative for chest pain and leg swelling. Gastrointestinal: Negative for abdominal pain. Genitourinary: Positive for urgency. Negative for dysuria and hematuria. Musculoskeletal: Negative for joint pain. Skin: Negative. Neurological: Negative for dizziness, sensory change, focal weakness and headaches. Endo/Heme/Allergies: Negative. Psychiatric/Behavioral: Negative for depression. The patient is nervous/anxious. All other systems reviewed and are negative. Physical Exam  Vitals and nursing note reviewed. Constitutional:       General: He is not in acute distress. Appearance: He is well-developed. Comments: pleasant   HENT:      Head: Normocephalic and atraumatic. Mouth/Throat:      Mouth: Mucous membranes are moist.   Eyes:      General: No scleral icterus. Conjunctiva/sclera: Conjunctivae normal.   Neck:      Thyroid: No thyromegaly. Vascular: No carotid bruit or JVD. Cardiovascular:      Rate and Rhythm: Normal rate and regular rhythm. Pulses: Normal pulses. Heart sounds: Normal heart sounds. No murmur heard. Pulmonary:      Effort: Pulmonary effort is normal. No respiratory distress. Breath sounds: Normal breath sounds. No wheezing or rales. Abdominal:      General: Bowel sounds are normal. There is no distension. Palpations: Abdomen is soft. Tenderness: There is no abdominal tenderness. There is no right CVA tenderness or left CVA tenderness. Musculoskeletal:         General: No tenderness. Cervical back: Normal range of motion and neck supple. Right lower leg: No edema. Left lower leg: No edema. Skin:     General: Skin is warm and dry. Findings: Erythema (nose and face c/w rosacea) present. No rash. Neurological:      Mental Status: He is alert and oriented to person, place, and time. Coordination: Coordination normal.      Gait: Gait normal.   Psychiatric:         Behavior: Behavior normal.      Comments: Seems distressed/anxious         ASSESSMENT and PLAN  Diagnoses and all orders for this visit:    1. Coronary artery disease involving native coronary artery of native heart without angina pectoris  -     LIPID PANEL; Future  -     METABOLIC PANEL, COMPREHENSIVE; Future  -     CBC W/O DIFF; Future  -     lisinopriL (PRINIVIL, ZESTRIL) 5 mg tablet; Take 1 Tablet by mouth daily. 2. Stented coronary artery  -     LIPID PANEL; Future  -     METABOLIC PANEL, COMPREHENSIVE; Future  -     CBC W/O DIFF; Future    3. H/O partial nephrectomy    4. Prostate cancer ([facility] [address]    5. Anxiety    6. Primary hypertension    7. Macular degeneration of both eyes, unspecified type    8. Face lesion  -     REFERRAL TO DERMATOLOGY      Follow-up and Dispositions    · Return in about 6 months (around 9/22/2022).      All chronic medical problems are stable  Continue with current medical management and plan  lab results and schedule of future lab studies reviewed with patient  reviewed diet, exercise and weight control  reviewed medications and side effects in detail  F/u with other MD's/ providers as scheduled  COVID-19 precautions discussed with pt  An After Visit Summary was printed and given to the patient.

## 2022-06-07 ENCOUNTER — OFFICE VISIT (OUTPATIENT)
Dept: INTERNAL MEDICINE CLINIC | Age: 82
End: 2022-06-07
Payer: MEDICARE

## 2022-06-07 VITALS
DIASTOLIC BLOOD PRESSURE: 78 MMHG | RESPIRATION RATE: 18 BRPM | BODY MASS INDEX: 24.87 KG/M2 | SYSTOLIC BLOOD PRESSURE: 122 MMHG | HEIGHT: 72 IN | HEART RATE: 79 BPM | TEMPERATURE: 97.6 F | OXYGEN SATURATION: 98 % | WEIGHT: 183.6 LBS

## 2022-06-07 DIAGNOSIS — I10 PRIMARY HYPERTENSION: ICD-10-CM

## 2022-06-07 DIAGNOSIS — R30.0 DYSURIA: Primary | ICD-10-CM

## 2022-06-07 DIAGNOSIS — Z90.5 H/O PARTIAL NEPHRECTOMY: ICD-10-CM

## 2022-06-07 DIAGNOSIS — N39.45 CONTINUOUS LEAKAGE OF URINE: ICD-10-CM

## 2022-06-07 DIAGNOSIS — I25.10 CORONARY ARTERY DISEASE INVOLVING NATIVE CORONARY ARTERY OF NATIVE HEART WITHOUT ANGINA PECTORIS: ICD-10-CM

## 2022-06-07 DIAGNOSIS — Z90.79 S/P TURP: ICD-10-CM

## 2022-06-07 DIAGNOSIS — Z95.5 STENTED CORONARY ARTERY: ICD-10-CM

## 2022-06-07 DIAGNOSIS — C61 PROSTATE CANCER (HCC): ICD-10-CM

## 2022-06-07 DIAGNOSIS — F41.9 ANXIETY: ICD-10-CM

## 2022-06-07 PROCEDURE — G8536 NO DOC ELDER MAL SCRN: HCPCS | Performed by: INTERNAL MEDICINE

## 2022-06-07 PROCEDURE — G8754 DIAS BP LESS 90: HCPCS | Performed by: INTERNAL MEDICINE

## 2022-06-07 PROCEDURE — G8427 DOCREV CUR MEDS BY ELIG CLIN: HCPCS | Performed by: INTERNAL MEDICINE

## 2022-06-07 PROCEDURE — 1101F PT FALLS ASSESS-DOCD LE1/YR: CPT | Performed by: INTERNAL MEDICINE

## 2022-06-07 PROCEDURE — G8752 SYS BP LESS 140: HCPCS | Performed by: INTERNAL MEDICINE

## 2022-06-07 PROCEDURE — G8510 SCR DEP NEG, NO PLAN REQD: HCPCS | Performed by: INTERNAL MEDICINE

## 2022-06-07 PROCEDURE — 99214 OFFICE O/P EST MOD 30 MIN: CPT | Performed by: INTERNAL MEDICINE

## 2022-06-07 PROCEDURE — G8420 CALC BMI NORM PARAMETERS: HCPCS | Performed by: INTERNAL MEDICINE

## 2022-06-07 RX ORDER — LORAZEPAM 0.5 MG/1
TABLET ORAL
Qty: 30 TABLET | Refills: 0 | Status: SHIPPED | OUTPATIENT
Start: 2022-06-07

## 2022-06-07 NOTE — PROGRESS NOTES
HISTORY OF PRESENT ILLNESS  Carlo Lal is a 81 y.o. male. Pt. comes in for f/u. Has a few chronic medical issues as documented including HTN, CAD, anxiety, prostate cancer with previous TURP and nephrectomy, urine incontinence. Vital signs are stable. Weight is stable with BMI 24.9. Followed by urologist.  Reports having continues urinary leakage. Using pads and diapers. Reports having some dysuria. Has been treated with antibiotics for UTI recently. Unable to give a urine sample now. Denies any hematuria. Tells me incontinence is very frustrating. All other chronic medical issues are stable on current treatment regimen. Has had Covid-19 vaccination. Reports taking proper precautions. Denies any related signs or symptoms. PMH/PSH/Allergies/Social History/medication list and most recent studies reviewed with patient. Tobacco use: No.  Recently quit. Alcohol use: Social    Reports compliance with medications and diet. Trying to be active physically to control weight. Reports no other new c/o. HPI    Review of Systems   Constitutional: Negative. HENT: Negative. Eyes: Negative. Respiratory: Negative for shortness of breath. Cardiovascular: Negative for chest pain and leg swelling. Gastrointestinal: Negative for abdominal pain. Genitourinary: Positive for dysuria, frequency and urgency (Continues incontinence. Wears diapers. ). Negative for flank pain and hematuria. Musculoskeletal: Negative for joint pain. Skin: Negative. Neurological: Negative for dizziness, sensory change, focal weakness and headaches. Endo/Heme/Allergies: Negative. Psychiatric/Behavioral: Negative for depression. The patient is nervous/anxious. All other systems reviewed and are negative. Physical Exam  Vitals and nursing note reviewed. Constitutional:       General: He is not in acute distress. Appearance: He is well-developed.       Comments: pleasant   HENT:      Head: Normocephalic and atraumatic. Mouth/Throat:      Mouth: Mucous membranes are moist.   Eyes:      General: No scleral icterus. Conjunctiva/sclera: Conjunctivae normal.   Neck:      Thyroid: No thyromegaly. Vascular: No carotid bruit or JVD. Cardiovascular:      Rate and Rhythm: Normal rate and regular rhythm. Pulses: Normal pulses. Heart sounds: Normal heart sounds. No murmur heard. Pulmonary:      Effort: Pulmonary effort is normal. No respiratory distress. Breath sounds: Normal breath sounds. No wheezing or rales. Abdominal:      General: Bowel sounds are normal. There is no distension. Palpations: Abdomen is soft. Tenderness: There is no abdominal tenderness. There is no right CVA tenderness or left CVA tenderness. Musculoskeletal:         General: No tenderness. Cervical back: Normal range of motion and neck supple. Right lower leg: No edema. Left lower leg: No edema. Skin:     General: Skin is warm and dry. Findings: Erythema (nose and face c/w rosacea) present. No rash. Neurological:      Mental Status: He is alert and oriented to person, place, and time. Coordination: Coordination normal.      Gait: Gait normal.   Psychiatric:         Behavior: Behavior normal.      Comments: Seems distressed/anxious         ASSESSMENT and PLAN  Diagnoses and all orders for this visit:    1. Dysuria  Patient is unable to give us a urine sample in the office  Specimen cup given to bring sample if possible  No need for more antibiotics since he just finished course of Cipro through urologist  2. Continuous leakage of urine    3. S/P TURP    4. Prostate cancer [facility])  Followed by urologist  Plan for XRT according to patient  5. H/O partial nephrectomy    6. Primary hypertension  Monitor BP at home with goal of 140/90 or less   Stable chronic condition. Continue current treatment/medications.   7. Coronary artery disease involving native coronary artery of native heart without angina pectoris  Stable chronic condition. Continue current treatment/medications. See cardiologist as scheduled  8. Stented coronary artery    9. Anxiety  -     LORazepam (ATIVAN) 0.5 mg tablet; 1 daily prn anxiety  Potential side effects and habit formation of lorazepam discussed    Follow-up and Dispositions    · Return in about 6 months (around 12/7/2022), or if symptoms worsen or fail to improve. Continue with current medical management and plan  lab results and schedule of future lab studies reviewed with patient  reviewed diet, exercise and weight control  reviewed medications and side effects in detail  F/u with other MD's/ providers as scheduled  COVID-19 precautions discussed with pt  An After Visit Summary was printed and given to the patient.

## 2022-06-07 NOTE — PROGRESS NOTES
Rm    Chief Complaint   Patient presents with    Urinary Retention     pt states he does not go urinate     Hemorrhoids        Visit Vitals  /78 (BP 1 Location: Left upper arm, BP Patient Position: Sitting, BP Cuff Size: Adult)   Pulse 79   Temp 97.6 °F (36.4 °C) (Oral)   Resp 18   Ht 6' (1.829 m)   Wt 183 lb 9.6 oz (83.3 kg)   SpO2 98%   BMI 24.90 kg/m²        1. Have you been to the ER, urgent care clinic since your last visit? Hospitalized since your last visit? No    2. Have you seen or consulted any other health care providers outside of the [address] [place] [address] since your last visit? Include any pap smears or colon screening. No     Health Maintenance Due   Topic Date Due    Pneumococcal 65+ years (1 - PCV) Never done    DTaP/Tdap/Td series (1 - Tdap) Never done    Shingrix Vaccine Age 50> (1 of 2) Never done    COVID-19 Vaccine (3 - Booster for Pfizer series) 08/10/2021        3 most recent PHQ Screens 6/7/2022   Little interest or pleasure in doing things Not at all   Feeling down, depressed, irritable, or hopeless Not at all   Total Score PHQ 2 0   Trouble falling or staying asleep, or sleeping too much -   Feeling tired or having little energy -   Poor appetite, weight loss, or overeating -   Feeling bad about yourself - or that you are a failure or have let yourself or your family down -   Trouble concentrating on things such as school, work, reading, or watching TV -   Moving or speaking so slowly that other people could have noticed; or the opposite being so fidgety that others notice -   Thoughts of being better off dead, or hurting yourself in some way -   PHQ 9 Score -        Fall Risk Assessment, last 12 mths 6/7/2022   Able to walk? Yes   Fall in past 12 months? 0   Do you feel unsteady?  0   Are you worried about falling 0       Learning Assessment 8/15/2014   PRIMARY LEARNER Patient   HIGHEST LEVEL OF EDUCATION - PRIMARY LEARNER  > 4 YEARS OF COLLEGE   BARRIERS PRIMARY LEARNER NONE   PRIMARY LANGUAGE OTHER (COMMENT)   LEARNER PREFERENCE PRIMARY READING   ANSWERED BY patient   RELATIONSHIP SELF

## 2022-08-05 ENCOUNTER — HOSPITAL ENCOUNTER (OUTPATIENT)
Dept: RADIATION THERAPY | Age: 82
Discharge: HOME | End: 2022-08-05

## 2022-08-05 DIAGNOSIS — R97.21 RISING PSA FOLLOWING TREATMENT FOR MALIGNANT NEOPLASM OF PROSTATE: ICD-10-CM

## 2022-08-05 DIAGNOSIS — C61 PROSTATE CANCER (HCC): Primary | ICD-10-CM

## 2022-08-05 DIAGNOSIS — N39.45 CONTINUOUS LEAKAGE OF URINE: ICD-10-CM

## 2022-08-23 ENCOUNTER — HOSPITAL ENCOUNTER (OUTPATIENT)
Dept: MRI IMAGING | Age: 82
Discharge: HOME | End: 2022-08-23
Attending: RADIOLOGY
Payer: MEDICARE

## 2022-08-23 DIAGNOSIS — C61 PROSTATE CANCER (HCC): ICD-10-CM

## 2022-08-23 DIAGNOSIS — N39.45 CONTINUOUS LEAKAGE OF URINE: ICD-10-CM

## 2022-08-23 DIAGNOSIS — R97.21 RISING PSA FOLLOWING TREATMENT FOR MALIGNANT NEOPLASM OF PROSTATE: ICD-10-CM

## 2022-08-23 PROCEDURE — 77030021566 MRI PELV W WO CONT

## 2022-08-23 PROCEDURE — A9576 INJ PROHANCE MULTIPACK: HCPCS

## 2022-08-23 PROCEDURE — 74011000258 HC RX REV CODE- 258: Performed by: RADIOLOGY

## 2022-08-23 PROCEDURE — 74011250636 HC RX REV CODE- 250/636

## 2022-08-23 PROCEDURE — 74011000250 HC RX REV CODE- 250: Performed by: RADIOLOGY

## 2022-08-23 RX ORDER — SODIUM CHLORIDE 0.9 % (FLUSH) 0.9 %
10 SYRINGE (ML) INJECTION
Status: COMPLETED | OUTPATIENT
Start: 2022-08-23 | End: 2022-08-23

## 2022-08-23 RX ADMIN — SODIUM CHLORIDE, PRESERVATIVE FREE 10 ML: 5 INJECTION INTRAVENOUS at 18:00

## 2022-08-23 RX ADMIN — SODIUM CHLORIDE 100 ML: 900 INJECTION, SOLUTION INTRAVENOUS at 18:00

## 2022-08-23 RX ADMIN — GADOTERIDOL 15 ML: 279.3 INJECTION, SOLUTION INTRAVENOUS at 17:57

## 2022-08-30 ENCOUNTER — HOSPITAL ENCOUNTER (OUTPATIENT)
Dept: RADIATION THERAPY | Age: 82
Discharge: HOME | End: 2022-08-30

## 2022-10-20 ENCOUNTER — HOSPITAL ENCOUNTER (OUTPATIENT)
Dept: RADIATION THERAPY | Age: 82
Discharge: HOME | End: 2022-10-20

## 2022-10-24 DIAGNOSIS — I25.10 CORONARY ARTERY DISEASE INVOLVING NATIVE CORONARY ARTERY OF NATIVE HEART WITHOUT ANGINA PECTORIS: ICD-10-CM

## 2022-10-24 RX ORDER — LISINOPRIL 5 MG/1
5 TABLET ORAL DAILY
Qty: 90 TABLET | Refills: 1 | Status: SHIPPED | OUTPATIENT
Start: 2022-10-24 | End: 2022-11-30

## 2022-10-24 RX ORDER — LISINOPRIL 5 MG/1
TABLET ORAL
Qty: 90 TABLET | Refills: 0 | Status: SHIPPED | OUTPATIENT
Start: 2022-10-24

## 2022-11-23 ENCOUNTER — APPOINTMENT (OUTPATIENT)
Dept: GENERAL RADIOLOGY | Age: 82
DRG: 189 | End: 2022-11-23
Attending: STUDENT IN AN ORGANIZED HEALTH CARE EDUCATION/TRAINING PROGRAM
Payer: MEDICARE

## 2022-11-23 ENCOUNTER — APPOINTMENT (OUTPATIENT)
Dept: CT IMAGING | Age: 82
DRG: 189 | End: 2022-11-23
Attending: STUDENT IN AN ORGANIZED HEALTH CARE EDUCATION/TRAINING PROGRAM
Payer: MEDICARE

## 2022-11-23 ENCOUNTER — HOSPITAL ENCOUNTER (INPATIENT)
Age: 82
LOS: 6 days | Discharge: HOME | DRG: 189 | End: 2022-11-30
Attending: STUDENT IN AN ORGANIZED HEALTH CARE EDUCATION/TRAINING PROGRAM | Admitting: STUDENT IN AN ORGANIZED HEALTH CARE EDUCATION/TRAINING PROGRAM
Payer: MEDICARE
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
## 2022-11-23 DIAGNOSIS — J96.01 ACUTE RESPIRATORY FAILURE WITH HYPOXIA (HCC): Primary | ICD-10-CM

## 2022-11-23 PROCEDURE — 72125 CT NECK SPINE W/O DYE: CPT

## 2022-11-23 PROCEDURE — 70450 CT HEAD/BRAIN W/O DYE: CPT

## 2022-11-23 PROCEDURE — 71045 X-RAY EXAM CHEST 1 VIEW: CPT

## 2022-11-23 PROCEDURE — 99285 EMERGENCY DEPT VISIT HI MDM: CPT

## 2022-11-23 PROCEDURE — 93005 ELECTROCARDIOGRAM TRACING: CPT

## 2022-11-23 RX ORDER — ONDANSETRON 2 MG/ML
4 INJECTION INTRAMUSCULAR; INTRAVENOUS ONCE
Status: COMPLETED | OUTPATIENT
Start: 2022-11-24 | End: 2022-11-24

## 2022-11-23 RX ORDER — ONDANSETRON 4 MG/1
4 TABLET, ORALLY DISINTEGRATING ORAL
Status: DISCONTINUED | OUTPATIENT
Start: 2022-11-24 | End: 2022-11-23

## 2022-11-24 ENCOUNTER — APPOINTMENT (OUTPATIENT)
Dept: ULTRASOUND IMAGING | Age: 82
DRG: 189 | End: 2022-11-24
Attending: FAMILY MEDICINE
Payer: MEDICARE

## 2022-11-24 ENCOUNTER — APPOINTMENT (OUTPATIENT)
Dept: CT IMAGING | Age: 82
DRG: 189 | End: 2022-11-24
Attending: STUDENT IN AN ORGANIZED HEALTH CARE EDUCATION/TRAINING PROGRAM
Payer: MEDICARE

## 2022-11-24 PROBLEM — J96.91 RESPIRATORY FAILURE WITH HYPOXIA (HCC): Status: ACTIVE | Noted: 2022-11-24

## 2022-11-24 LAB
ALBUMIN SERPL-MCNC: 3.9 G/DL (ref 3.5–5)
ALBUMIN/GLOB SERPL: 1.2 {RATIO} (ref 1.1–2.2)
ALP SERPL-CCNC: 92 U/L (ref 45–117)
ALT SERPL-CCNC: 48 U/L (ref 12–78)
ANION GAP SERPL CALC-SCNC: 11 MMOL/L (ref 5–15)
APPEARANCE UR: ABNORMAL
ARTERIAL PATENCY WRIST A: POSITIVE
AST SERPL-CCNC: 40 U/L (ref 15–37)
ATRIAL RATE: 92 BPM
B PERT DNA SPEC QL NAA+PROBE: NOT DETECTED
BACTERIA URNS QL MICRO: ABNORMAL /HPF
BASE DEFICIT BLD-SCNC: 0.7 MMOL/L
BASOPHILS # BLD: 0 K/UL (ref 0–0.1)
BASOPHILS NFR BLD: 0 % (ref 0–1)
BDY SITE: ABNORMAL
BILIRUB SERPL-MCNC: 0.4 MG/DL (ref 0.2–1)
BILIRUB UR QL: NEGATIVE
BORDETELLA PARAPERTUSSIS PCR, BORPAR: NOT DETECTED
BUN SERPL-MCNC: 31 MG/DL (ref 6–20)
BUN/CREAT SERPL: 20 (ref 12–20)
C PNEUM DNA SPEC QL NAA+PROBE: NOT DETECTED
CALCIUM SERPL-MCNC: 9.7 MG/DL (ref 8.5–10.1)
CALCULATED P AXIS, ECG09: 51 DEGREES
CALCULATED R AXIS, ECG10: -13 DEGREES
CALCULATED T AXIS, ECG11: -28 DEGREES
CHLORIDE SERPL-SCNC: 105 MMOL/L (ref 97–108)
CO2 SERPL-SCNC: 26 MMOL/L (ref 21–32)
COLOR UR: ABNORMAL
CREAT SERPL-MCNC: 1.53 MG/DL (ref 0.7–1.3)
DIAGNOSIS, 93000: NORMAL
DIFFERENTIAL METHOD BLD: ABNORMAL
EOSINOPHIL # BLD: 0.1 K/UL (ref 0–0.4)
EOSINOPHIL NFR BLD: 0 % (ref 0–7)
EPITH CASTS URNS QL MICRO: ABNORMAL /LPF
ERYTHROCYTE [DISTWIDTH] IN BLOOD BY AUTOMATED COUNT: 12.7 % (ref 11.5–14.5)
FLUAV SUBTYP SPEC NAA+PROBE: NOT DETECTED
FLUBV RNA SPEC QL NAA+PROBE: NOT DETECTED
GAS FLOW.O2 O2 DELIVERY SYS: ABNORMAL L/MIN
GLOBULIN SER CALC-MCNC: 3.3 G/DL (ref 2–4)
GLUCOSE SERPL-MCNC: 139 MG/DL (ref 65–100)
GLUCOSE UR STRIP.AUTO-MCNC: NEGATIVE MG/DL
HADV DNA SPEC QL NAA+PROBE: NOT DETECTED
HCO3 BLD-SCNC: 23.1 MMOL/L (ref 22–26)
HCOV 229E RNA SPEC QL NAA+PROBE: NOT DETECTED
HCOV HKU1 RNA SPEC QL NAA+PROBE: NOT DETECTED
HCOV NL63 RNA SPEC QL NAA+PROBE: NOT DETECTED
HCOV OC43 RNA SPEC QL NAA+PROBE: NOT DETECTED
HCT VFR BLD AUTO: 40.2 % (ref 36.6–50.3)
HGB BLD-MCNC: 13.1 G/DL (ref 12.1–17)
HGB UR QL STRIP: ABNORMAL
HMPV RNA SPEC QL NAA+PROBE: NOT DETECTED
HPIV1 RNA SPEC QL NAA+PROBE: NOT DETECTED
HPIV2 RNA SPEC QL NAA+PROBE: NOT DETECTED
HPIV3 RNA SPEC QL NAA+PROBE: NOT DETECTED
HPIV4 RNA SPEC QL NAA+PROBE: NOT DETECTED
IMM GRANULOCYTES # BLD AUTO: 0.2 K/UL (ref 0–0.04)
IMM GRANULOCYTES NFR BLD AUTO: 1 % (ref 0–0.5)
KETONES UR QL STRIP.AUTO: NEGATIVE MG/DL
LACTATE SERPL-SCNC: 1.6 MMOL/L (ref 0.4–2)
LEUKOCYTE ESTERASE UR QL STRIP.AUTO: ABNORMAL
LYMPHOCYTES # BLD: 1.1 K/UL (ref 0.8–3.5)
LYMPHOCYTES NFR BLD: 6 % (ref 12–49)
M PNEUMO DNA SPEC QL NAA+PROBE: NOT DETECTED
MCH RBC QN AUTO: 32.1 PG (ref 26–34)
MCHC RBC AUTO-ENTMCNC: 32.6 G/DL (ref 30–36.5)
MCV RBC AUTO: 98.5 FL (ref 80–99)
MONOCYTES # BLD: 1 K/UL (ref 0–1)
MONOCYTES NFR BLD: 6 % (ref 5–13)
NEUTS SEG # BLD: 15 K/UL (ref 1.8–8)
NEUTS SEG NFR BLD: 87 % (ref 32–75)
NITRITE UR QL STRIP.AUTO: POSITIVE
NRBC # BLD: 0 K/UL (ref 0–0.01)
NRBC BLD-RTO: 0 PER 100 WBC
O2/TOTAL GAS SETTING VFR VENT: 3 %
P-R INTERVAL, ECG05: 162 MS
PCO2 BLD: 34.5 MMHG (ref 35–45)
PH BLD: 7.43 [PH] (ref 7.35–7.45)
PH UR STRIP: 5.5 [PH] (ref 5–8)
PLATELET # BLD AUTO: 203 K/UL (ref 150–400)
PMV BLD AUTO: 10.2 FL (ref 8.9–12.9)
PO2 BLD: 62 MMHG (ref 80–100)
POTASSIUM SERPL-SCNC: 3.9 MMOL/L (ref 3.5–5.1)
PROCALCITONIN SERPL-MCNC: 2.07 NG/ML
PROT SERPL-MCNC: 7.2 G/DL (ref 6.4–8.2)
PROT UR STRIP-MCNC: 100 MG/DL
Q-T INTERVAL, ECG07: 392 MS
QRS DURATION, ECG06: 116 MS
QTC CALCULATION (BEZET), ECG08: 484 MS
RBC # BLD AUTO: 4.08 M/UL (ref 4.1–5.7)
RBC #/AREA URNS HPF: ABNORMAL /HPF (ref 0–5)
RSV RNA SPEC QL NAA+PROBE: NOT DETECTED
RV+EV RNA SPEC QL NAA+PROBE: NOT DETECTED
SAO2 % BLD: 92.1 % (ref 92–97)
SARS-COV-2 PCR, COVPCR: NOT DETECTED
SODIUM SERPL-SCNC: 142 MMOL/L (ref 136–145)
SP GR UR REFRACTOMETRY: 1.02
SPECIMEN TYPE: ABNORMAL
TROPONIN-HIGH SENSITIVITY: 23 NG/L (ref 0–76)
TROPONIN-HIGH SENSITIVITY: 52 NG/L (ref 0–76)
TROPONIN-HIGH SENSITIVITY: 71 NG/L (ref 0–76)
UROBILINOGEN UR QL STRIP.AUTO: 0.2 EU/DL (ref 0.2–1)
VENTRICULAR RATE, ECG03: 92 BPM
WBC # BLD AUTO: 17.4 K/UL (ref 4.1–11.1)
WBC URNS QL MICRO: ABNORMAL /HPF (ref 0–4)

## 2022-11-24 PROCEDURE — 0202U NFCT DS 22 TRGT SARS-COV-2: CPT

## 2022-11-24 PROCEDURE — 74011000250 HC RX REV CODE- 250: Performed by: FAMILY MEDICINE

## 2022-11-24 PROCEDURE — 74011250636 HC RX REV CODE- 250/636: Performed by: FAMILY MEDICINE

## 2022-11-24 PROCEDURE — 81001 URINALYSIS AUTO W/SCOPE: CPT

## 2022-11-24 PROCEDURE — 80053 COMPREHEN METABOLIC PANEL: CPT

## 2022-11-24 PROCEDURE — 74011250636 HC RX REV CODE- 250/636: Performed by: STUDENT IN AN ORGANIZED HEALTH CARE EDUCATION/TRAINING PROGRAM

## 2022-11-24 PROCEDURE — 87040 BLOOD CULTURE FOR BACTERIA: CPT

## 2022-11-24 PROCEDURE — 36600 WITHDRAWAL OF ARTERIAL BLOOD: CPT

## 2022-11-24 PROCEDURE — 74011000636 HC RX REV CODE- 636: Performed by: STUDENT IN AN ORGANIZED HEALTH CARE EDUCATION/TRAINING PROGRAM

## 2022-11-24 PROCEDURE — 76705 ECHO EXAM OF ABDOMEN: CPT

## 2022-11-24 PROCEDURE — 74177 CT ABD & PELVIS W/CONTRAST: CPT

## 2022-11-24 PROCEDURE — 84145 PROCALCITONIN (PCT): CPT

## 2022-11-24 PROCEDURE — 82803 BLOOD GASES ANY COMBINATION: CPT

## 2022-11-24 PROCEDURE — 36415 COLL VENOUS BLD VENIPUNCTURE: CPT

## 2022-11-24 PROCEDURE — 71275 CT ANGIOGRAPHY CHEST: CPT

## 2022-11-24 PROCEDURE — 90714 TD VACC NO PRESV 7 YRS+ IM: CPT | Performed by: STUDENT IN AN ORGANIZED HEALTH CARE EDUCATION/TRAINING PROGRAM

## 2022-11-24 PROCEDURE — 85025 COMPLETE CBC W/AUTO DIFF WBC: CPT

## 2022-11-24 PROCEDURE — 83605 ASSAY OF LACTIC ACID: CPT

## 2022-11-24 PROCEDURE — 74011250637 HC RX REV CODE- 250/637: Performed by: STUDENT IN AN ORGANIZED HEALTH CARE EDUCATION/TRAINING PROGRAM

## 2022-11-24 PROCEDURE — 65270000046 HC RM TELEMETRY

## 2022-11-24 PROCEDURE — 96374 THER/PROPH/DIAG INJ IV PUSH: CPT

## 2022-11-24 PROCEDURE — 84484 ASSAY OF TROPONIN QUANT: CPT

## 2022-11-24 RX ORDER — HEPARIN SODIUM 5000 [USP'U]/ML
5000 INJECTION, SOLUTION INTRAVENOUS; SUBCUTANEOUS EVERY 8 HOURS
Status: DISCONTINUED | OUTPATIENT
Start: 2022-11-24 | End: 2022-11-30 | Stop reason: HOSPADM

## 2022-11-24 RX ORDER — IPRATROPIUM BROMIDE AND ALBUTEROL SULFATE 2.5; .5 MG/3ML; MG/3ML
3 SOLUTION RESPIRATORY (INHALATION)
Status: DISCONTINUED | OUTPATIENT
Start: 2022-11-24 | End: 2022-11-30 | Stop reason: HOSPADM

## 2022-11-24 RX ORDER — ONDANSETRON 2 MG/ML
4 INJECTION INTRAMUSCULAR; INTRAVENOUS ONCE
Status: COMPLETED | OUTPATIENT
Start: 2022-11-24 | End: 2022-11-24

## 2022-11-24 RX ORDER — SODIUM CHLORIDE 0.9 % (FLUSH) 0.9 %
5-40 SYRINGE (ML) INJECTION AS NEEDED
Status: DISCONTINUED | OUTPATIENT
Start: 2022-11-24 | End: 2022-11-30 | Stop reason: HOSPADM

## 2022-11-24 RX ORDER — ACETAMINOPHEN 325 MG/1
650 TABLET ORAL
Status: DISCONTINUED | OUTPATIENT
Start: 2022-11-24 | End: 2022-11-30 | Stop reason: HOSPADM

## 2022-11-24 RX ORDER — FAMOTIDINE 20 MG/1
20 TABLET, FILM COATED ORAL ONCE
Status: COMPLETED | OUTPATIENT
Start: 2022-11-24 | End: 2022-11-24

## 2022-11-24 RX ORDER — SODIUM CHLORIDE 0.9 % (FLUSH) 0.9 %
5-40 SYRINGE (ML) INJECTION EVERY 8 HOURS
Status: DISCONTINUED | OUTPATIENT
Start: 2022-11-24 | End: 2022-11-30 | Stop reason: HOSPADM

## 2022-11-24 RX ORDER — SODIUM CHLORIDE 9 MG/ML
75 INJECTION, SOLUTION INTRAVENOUS CONTINUOUS
Status: DISCONTINUED | OUTPATIENT
Start: 2022-11-24 | End: 2022-11-26

## 2022-11-24 RX ADMIN — ONDANSETRON HYDROCHLORIDE 4 MG: 2 SOLUTION INTRAMUSCULAR; INTRAVENOUS at 06:36

## 2022-11-24 RX ADMIN — SODIUM CHLORIDE, PRESERVATIVE FREE 10 ML: 5 INJECTION INTRAVENOUS at 18:58

## 2022-11-24 RX ADMIN — FAMOTIDINE 20 MG: 20 TABLET, FILM COATED ORAL at 06:19

## 2022-11-24 RX ADMIN — HEPARIN SODIUM 5000 UNITS: 5000 INJECTION, SOLUTION INTRAVENOUS; SUBCUTANEOUS at 18:58

## 2022-11-24 RX ADMIN — IOPAMIDOL 100 ML: 755 INJECTION, SOLUTION INTRAVENOUS at 02:09

## 2022-11-24 RX ADMIN — SODIUM CHLORIDE 75 ML/HR: 9 INJECTION, SOLUTION INTRAVENOUS at 12:46

## 2022-11-24 RX ADMIN — ONDANSETRON HYDROCHLORIDE 4 MG: 2 SOLUTION INTRAMUSCULAR; INTRAVENOUS at 01:01

## 2022-11-24 RX ADMIN — HEPARIN SODIUM 5000 UNITS: 5000 INJECTION, SOLUTION INTRAVENOUS; SUBCUTANEOUS at 12:46

## 2022-11-24 RX ADMIN — SODIUM CHLORIDE 1000 ML: 9 INJECTION, SOLUTION INTRAVENOUS at 01:00

## 2022-11-24 RX ADMIN — AZITHROMYCIN DIHYDRATE 500 MG: 500 INJECTION, POWDER, LYOPHILIZED, FOR SOLUTION INTRAVENOUS at 12:45

## 2022-11-24 RX ADMIN — SODIUM CHLORIDE, PRESERVATIVE FREE 1 G: 5 INJECTION INTRAVENOUS at 12:44

## 2022-11-24 RX ADMIN — TETANUS AND DIPHTHERIA TOXOIDS ADSORBED 0.5 ML: 2; 2 INJECTION INTRAMUSCULAR at 03:55

## 2022-11-24 NOTE — ROUTINE PROCESS
TRANSFER - IN REPORT:    Verbal report received from [facility] (name) on Molly Rojas  being received from [place] ED (unit) for routine progression of care      Report consisted of patients Situation, Background, Assessment and   Recommendations(SBAR). Information from the following report(s) SBAR, Kardex, ED Summary, Procedure Summary, Intake/Output, MAR, Accordion, Recent Results, Med Rec Status, Cardiac Rhythm SR, Alarm Parameters , Pre Procedure Checklist, Procedure Verification, Quality Measures, and Dual Neuro Assessment was reviewed with the receiving nurse. Opportunity for questions and clarification was provided. Assessment completed upon patients arrival to unit and care assumed.         Primary Nurse Mable Dougherty, CHANDA and Felipe Dixon RN performed a dual skin assessment on this patient Impairment noted- see wound doc flow sheet  Alex score is 17

## 2022-11-24 NOTE — ED PROVIDER NOTES
71-year-old male with history of prostate cancer on oral radiation, MI presents to the ED with chief complaint of ground-level fall. Patient was walking down stairs, tripped, and fell backwards hitting his head. Did not lose consciousness. Patient has since had nausea and vomited twice. Has associated pale skin. No fevers, chills, headache, neck pain, numbness, weakness, vision changes, speech changes, chest pain, difficulty breathing, abdominal pain, urinary symptoms, bowel symptoms. Patient on 81 mg of aspirin, no other blood thinners. In addition, has abrasions to dorsum of bilateral hands secondary to fall. The history is provided by the patient. Fall  Associated symptoms include nausea and vomiting. Pertinent negatives include no fever, no numbness, no abdominal pain, no hematuria and no headaches.       Past Medical History:   Diagnosis Date    Bladder cancer ([facility] [address]     CAD (coronary artery disease)     MI 2004    Cancer [facility])     prostate s/p xrt    Macular degeneration     MI (myocardial infarction) ([facility] [address] 01/01/2004       Past Surgical History:   Procedure Laterality Date    HX APPENDECTOMY  1955    HX CATARACT REMOVAL      bilateral    HX UROLOGICAL  6/24/15    RIGHT ROBOTIC PARTIAL NEPHRECTOMY  (LATEX ALLERGY), renal exploration, cystectomy x 3    [place] CARDIAC SURG PROCEDURE UNLIST      2 Stents         Family History:   Problem Relation Age of Onset    Alzheimer's Disease Mother     Cancer Father         colon    No Known Problems Sister     No Known Problems Sister     No Known Problems Sister        Social History     Socioeconomic History    Marital status:      Spouse name: Not on file    Number of children: Not on file    Years of education: Not on file    Highest education level: Not on file   Occupational History    Not on file   Tobacco Use    Smoking status: Every Day     Packs/day: 0.50     Years: 40.00     Pack years: 20.00     Types: Cigarettes    Smokeless tobacco: Never Tobacco comments:     1 pack every 4 days   Substance and Sexual Activity    Alcohol use: No     Alcohol/week: 0.0 standard drinks    Drug use: No    Sexual activity: Not on file   Other Topics Concern    Not on file   Social History Narrative    Not on file     Social Determinants of Health     Financial Resource Strain: Not on file   Food Insecurity: Not on file   Transportation Needs: Not on file   Physical Activity: Not on file   Stress: Not on file   Social Connections: Not on file   Intimate Partner Violence: Not on file   Housing Stability: Not on file         ALLERGIES: Latex    Review of Systems   Constitutional:  Negative for chills and fever. HENT:  Negative for congestion and rhinorrhea. Respiratory:  Negative for cough, shortness of breath and wheezing. Cardiovascular:  Negative for chest pain and leg swelling. Gastrointestinal:  Positive for nausea and vomiting. Negative for abdominal pain, constipation and diarrhea. Genitourinary:  Negative for difficulty urinating, dysuria and hematuria. Musculoskeletal:  Negative for back pain and neck pain. Skin:  Positive for color change and wound. Negative for rash. Neurological:  Negative for weakness, numbness and headaches. Psychiatric/Behavioral:  Negative for behavioral problems and confusion. Vitals:    11/23/22 2036 11/24/22 0142   BP: 134/68    Pulse: 78    Resp: 15    Temp: 98 °F (36.7 °C) 98.5 °F (36.9 °C)   SpO2: 98%    Weight: 80.5 kg (177 lb 7.5 oz)    Height: 5' 9\" (1.753 m)             Physical Exam  Constitutional:       General: He is not in acute distress. Appearance: He is well-developed. He is diaphoretic. HENT:      Head: Normocephalic and atraumatic. Eyes:      Conjunctiva/sclera: Conjunctivae normal.      Pupils: Pupils are equal, round, and reactive to light. Neck:      Trachea: No tracheal deviation. Cardiovascular:      Rate and Rhythm: Normal rate and regular rhythm.       Heart sounds: No murmur heard.    No friction rub. No gallop. Pulmonary:      Effort: No respiratory distress. Breath sounds: Normal breath sounds. Abdominal:      General: Bowel sounds are normal. There is no distension. Palpations: Abdomen is soft. Tenderness: There is no abdominal tenderness. Musculoskeletal:         General: No deformity. Cervical back: Neck supple. Skin:     General: Skin is warm. Coloration: Skin is pale. Neurological:      Mental Status: He is alert and oriented to person, place, and time. MDM  Number of Diagnoses or Management Options  Acute respiratory failure with hypoxia ([address]  Fall from ground level  Diagnosis management comments: 28-year-old male presenting to the ED with chief complaint of fall. Patient pale and diaphoretic on initial exam with nausea and vomiting. Differential includes intracranial hemorrhage, concussion, ACS, PE. While in the ED patient developed hypoxia into the lower 80s with a good waveform though did not report any worsening symptoms. Pulse ox changed to different fingers, earlobe, forehead without any improvement so placed on 4 L of oxygen via nasal cannula with improvement in sats to the 90s. CTA of the chest obtained without any evidence of PE. Patient did have elevated white blood cell count of 17 but no obvious source of infection. Vital signs otherwise stable. Patient admitted to the hospitalist for further work-up. Amount and/or Complexity of Data Reviewed  Clinical lab tests: ordered and reviewed  Tests in the radiology section of CPT®: ordered and reviewed           Procedures    2:49 AM    Admission Note:  Patient is being admitted to the hospital, Service: Hospitalist.  The results of their tests and reasons for their admission have been discussed with them and available family. They convey agreement and understanding for the need to be admitted and for their admission diagnosis.      LABORATORY TESTS:  Labs Reviewed METABOLIC PANEL, COMPREHENSIVE - Abnormal; Notable for the following components:       Result Value    Glucose 139 (*)     BUN 31 (*)     Creatinine 1.53 (*)     eGFR 45 (*)     AST (SGOT) 40 (*)     All other components within normal limits   CBC WITH AUTOMATED DIFF - Abnormal; Notable for the following components:    WBC 17.4 (*)     RBC 4.08 (*)     NEUTROPHILS 87 (*)     LYMPHOCYTES 6 (*)     IMMATURE GRANULOCYTES 1 (*)     ABS. NEUTROPHILS 15.0 (*)     ABS. IMM. GRANS. 0.2 (*)     All other components within normal limits   TROPONIN-HIGH SENSITIVITY   URINALYSIS W/MICROSCOPIC       IMAGING RESULTS:      MEDICATIONS GIVEN:  Medications   tetanus-diphtheria toxoids-Td (Td) 2-2 Lf unit/0.5 mL injection 0.5 mL (has no administration in time range)   ondansetron (ZOFRAN) injection 4 mg (4 mg IntraVENous Given 11/24/22 0101)   sodium chloride 0.9 % bolus infusion 1,000 mL (1,000 mL IntraVENous New Bag 11/24/22 0100)   iopamidoL (ISOVUE-370) 76 % injection 100 mL (100 mL IntraVENous Given 11/24/22 0209)       IMPRESSION:  1. Acute respiratory failure with hypoxia ([address]    2. Fall from ground level        PLAN:  - Admit to hospitalist    CONDITION:  stable    Hospitalist Perfect Serve for Admission  2:49 AM    ED Room Number: SER03/03  Patient Name and age:  Amilcar Lamb 81 y.o.  male  Working Diagnosis:   1. Acute respiratory failure with hypoxia ([address]    2. Fall from ground level        COVID-19 Suspicion:  no    Code Status:  Full Code  Readmission: no  Isolation Requirements:  no  Recommended Level of Care:  med/surg  Department:[place] [facility] - [phone]  Other:  Hx prostate Ca, oral radiation. GLF today, nausea and vomiting afterwards. Hypoxic in the low 80's in the ED improved to 92-94% on 4 L NC. Unclear cause, CTA chest negative. Head CT negative as well. Does have WBC of 17 but no source of infection, afebrile, normal heart rate and BP.     Signed By: Lizett Villa MD     November 24, 2022

## 2022-11-24 NOTE — ED NOTES
TRANSFER - OUT REPORT:    Verbal report given to Yamilet Pearson RN(name) on [address] [place] [address] Avenue  being transferred to [facility](unit) for routine progression of care       Report consisted of patients Situation, Background, Assessment and   Recommendations(SBAR). Information from the following report(s) SBAR, Kardex, ED Summary, MAR, and Recent Results was reviewed with the receiving nurse. Lines:   Peripheral IV 11/24/22 Anterior;Left;Proximal Forearm (Active)        Opportunity for questions and clarification was provided.       Patient transported with:   Oxygen and Tech

## 2022-11-24 NOTE — ED NOTES
Tried turning patient in bed. Pt unable unable to help us to roll him to his side and request us to stop.

## 2022-11-24 NOTE — ED TRIAGE NOTES
Pt reports falling down stairs (3-4 steps). Hit his head on a big plastic container breaking it with his head. Pt reported dizziness after falling. Pt on aspirin 81mg, no other blood thinners. Pt denies LOC. Also has abrasions to both hands dorsal side.

## 2022-11-24 NOTE — H&P
[facility] Adult  Hospitalist Group  History and Physical    Date of Service:  11/24/2022  Primary Care Provider: Delfina Huntley DO  Source of information: The patient and Chart review    Chief Complaint: Fall      History of Presenting Illness:   Corina Mcghee is a 81 y.o. male who presents with Fall. Patient presents to the ER complaining of fall, history of prostate cancer, presents to the ER with fall, patient reports that he was walking down the stairs, tripped and fell backwards, hitting his head, did not lose consciousness, denies any pain or discomfort anywhere, had 2 episodes of nausea and vomiting, got concerned and came to the ER, patient was found to be hypoxic in the ER, and was requested to be admitted to the hospitalist service, denies any other complaints or problems      The patient denies any headache, blurry vision, sore throat, trouble swallowing, trouble with speech, chest pain, , cough, fever, chills,, abd pain, urinary symptoms, constipation, recent travels, sick contacts, focal or generalized neurological symptoms, falls, injuries, rashes, contact with COVID-19 diagnosed patients, hematemesis, melena, hemoptysis, hematuria, rashes, denies starting any new medications and denies any other concerns or problems besides as mentioned above. REVIEW OF SYSTEMS:  A comprehensive review of systems was negative except for that written in the History of Present Illness.      Past Medical History:   Diagnosis Date    Bladder cancer ([facility] [address]     CAD (coronary artery disease)     MI 2004    Cancer [facility])     prostate s/p xrt    Macular degeneration     MI (myocardial infarction) ([facility] [address] 01/01/2004      Past Surgical History:   Procedure Laterality Date    HX APPENDECTOMY  1955    HX CATARACT REMOVAL      bilateral    HX UROLOGICAL  6/24/15    RIGHT ROBOTIC PARTIAL NEPHRECTOMY  (LATEX ALLERGY), renal exploration, cystectomy x 3    [place] CARDIAC SURG PROCEDURE UNLIST      2 Stents Prior to Admission medications    Medication Sig Start Date End Date Taking? Authorizing Provider   lisinopriL (PRINIVIL, ZESTRIL) 5 mg tablet Take 1 Tablet by mouth daily. 10/24/22   Naty Caraballo NP   lisinopriL (PRINIVIL, ZESTRIL) 5 mg tablet Take 1 tablet by mouth once daily 10/24/22   Naty Caraballo NP   LORazepam (ATIVAN) 0.5 mg tablet 1 daily prn anxiety 6/7/22   Sally Kim DO   predniSONE (DELTASONE) 5 mg tablet TAKE 1 TABLET BY MOUTH ONCE DAILY WITH FOOD 7/2/21   Provider, Historical   abiraterone 250 mg tab TAKE 1 TABLET BY MOUTH ONCE DAILY WITH A LOW FAT BREAKFAST 7/2/21   Provider, Historical   vit A/vit C/vit E/zinc/copper (PRESERVISION AREDS PO) 2 Tabs. Provider, Historical   Aspirin, Buffered 81 mg tab Take  by mouth. Provider, Historical   bisacodyl (DULCOLAX, BISACODYL,) 5 mg EC tablet Take 5 mg by mouth daily. Indications: CONSTIPATION    Provider, Historical   saw palmetto 160 mg cap Take 1 Cap by mouth two (2) times a day. Provider, Historical   multivitamin (ONE A DAY) tablet Take 1 Tab by mouth daily. Provider, Historical     Allergies   Allergen Reactions    Latex Other (comments)     Blistering of the skin      Family History   Problem Relation Age of Onset    Alzheimer's Disease Mother     Cancer Father         colon    No Known Problems Sister     No Known Problems Sister     No Known Problems Sister       Social History:  reports that he has been smoking cigarettes. He has a 20.00 pack-year smoking history. He has never used smokeless tobacco. He reports that he does not drink alcohol and does not use drugs. Family and social history were personally reviewed, all pertinent and relevant details are outlined as above.     Objective:   Visit Vitals  /60   Pulse 85   Temp 98.5 °F (36.9 °C)   Resp 16   Ht 5' 9\" (1.753 m)   Wt 80.5 kg (177 lb 7.5 oz)   SpO2 94%   BMI 26.21 kg/m²    O2 Flow Rate (L/min): 4 l/min O2 Device: Nasal cannula    PHYSICAL EXAM: General: Alert x oriented x 3, awake,   HEENT: PEERL, EOMI, moist mucus membranes  Neck: Supple, no JVD, no meningeal signs  Chest: Decreased basal breath sounds  CVS: RRR, S1 S2 heard, no murmurs/rubs/gallops  Abd: Soft, non-tender, non-distended, +bowel sounds   Ext: No clubbing, no cyanosis, no edema  Neuro/Psych: Pleasant mood and affect, CN 2-12 grossly intact, sensory grossly within normal limit, Strength 5/5 in all extremities, DTR 1+ x 4  Cap refill: Brisk, less than 3 seconds  Pulses: 2+, symmetric in all extremities  Skin: Warm, dry, without rashes or lesions    Data Review: All diagnostic labs and studies have been reviewed. Abnormal Labs Reviewed   METABOLIC PANEL, COMPREHENSIVE - Abnormal; Notable for the following components:       Result Value    Glucose 139 (*)     BUN 31 (*)     Creatinine 1.53 (*)     eGFR 45 (*)     AST (SGOT) 40 (*)     All other components within normal limits   CBC WITH AUTOMATED DIFF - Abnormal; Notable for the following components:    WBC 17.4 (*)     RBC 4.08 (*)     NEUTROPHILS 87 (*)     LYMPHOCYTES 6 (*)     IMMATURE GRANULOCYTES 1 (*)     ABS. NEUTROPHILS 15.0 (*)     ABS. IMM. GRANS. 0.2 (*)     All other components within normal limits       All Micro Results       Procedure Component Value Units Date/Time    CULTURE, BLOOD, PAIRED [[id number]]     Order Status: Sent Specimen: Blood             IMAGING:   CTA CHEST W OR W WO CONT   Final Result   1. No evidence of pulmonary embolism. 2. Delayed right renal nephrogram, right hydronephrosis and right hydroureter to   the level of the right UVJ where there is a 5 mm calculus and a right UVJ   urinary bladder neoplasm. Additional, irregular urinary bladder masses, as   described above. Recommend cystoscopy for further evaluation. 3. Mild bibasilar atelectasis. CT ABD PELV W CONT   Final Result   1. No evidence of pulmonary embolism.     2. Delayed right renal nephrogram, right hydronephrosis and right hydroureter to   the level of the right UVJ where there is a 5 mm calculus and a right UVJ   urinary bladder neoplasm. Additional, irregular urinary bladder masses, as   described above. Recommend cystoscopy for further evaluation. 3. Mild bibasilar atelectasis. XR CHEST PORT   Final Result   No pneumothorax or obvious fracture seen. No acute cardiopulmonary abnormality. CT HEAD WO CONT   Final Result   1. No evidence of acute intracranial abnormality by this modality. CT SPINE CERV WO CONT   Final Result      No fracture, vertebral listhesis or facet malalignment. ECG/ECHO:    Results for orders placed or performed during the hospital encounter of 11/23/22   EKG, 12 LEAD, INITIAL   Result Value Ref Range    Ventricular Rate 92 BPM    Atrial Rate 92 BPM    P-R Interval 162 ms    QRS Duration 116 ms    Q-T Interval 392 ms    QTC Calculation (Bezet) 484 ms    Calculated P Axis 51 degrees    Calculated R Axis -13 degrees    Calculated T Axis -28 degrees    Diagnosis       Normal sinus rhythm  Right bundle branch block    When compared with ECG of 07-MAY-2013 09:23,  No significant change was found  Confirmed by Teresa Flores M.D., Marylou Knox ([id number]) on 11/24/2022 10:21:12 AM          Assessment:   Given the patient's current clinical presentation, there is a high level of concern for decompensation if discharged from the emergency department. Complex decision making was performed, which includes reviewing the patient's available past medical records, laboratory results, and imaging studies.       Acute hypoxic respiratory failure  Fall  Leukocytosis  Acute kidney injury  Prostate cancer with possible bladder metastasis  Plan:     Patient will be admitted on a telemetry bed, unclear etiology for hypoxia, check procalcitonin level, CT of the chest with no acute pathology, check COVID and influenza testing, start patient on empirical antibiotics, pulmonary consult, DuoNebs, oxygen support, ABG, troponin, close monitoring and further intervention per hospital course, reassess as needed  Likely mechanical fall, PT OT consult, fall precautions, monitor, CT of the head with no acute pathology  Unclear etiology, procalcitonin levels pending, imaging does not show any acute signs of infection, continue to trend and monitor  Likely prerenal, avoid nephrotoxic medication, renally dose all medication, trend creatinine, IV hydration, repeat labs in the morning  Urology consult, supportive care, monitor      DIET: ADULT DIET Regular; 4 carb choices (60 gm/meal); Low Fat/Low Chol/High Fiber/2 gm Na; Low Sodium (2 gm)   ISOLATION PRECAUTIONS: There are currently no Active Isolations  CODE STATUS: Full Code   DVT PROPHYLAXIS: Heparin  FUNCTIONAL STATUS PRIOR TO HOSPITALIZATION: Fully active and ambulatory; able to carry on all self-care without restriction. Ambulatory status/function: By self   EARLY MOBILITY ASSESSMENT: Recommend an assessment from physical therapy and/or occupational therapy  ANTICIPATED DISCHARGE: 24-48 hours. ANTICIPATED DISPOSITION: Home with Home Healthcare    CRITICAL CARE WAS PERFORMED FOR THIS ENCOUNTER: YES. I had a face to face encounter with the patient, reviewed and interpreted patient data including clinical events, labs, images, vital signs, I/O's, and examined patient. I have discussed the case and the plan and management of the patient's care with the consulting services, the bedside nurses and necessary ancillary providers. This patient has a high probability of imminent, clinically significant deterioration, which requires the highest level of preparedness to intervene urgently. I participated in the decision-making and personally managed or directed the management of the following life and organ supporting interventions that required my frequent assessment to treat or prevent imminent deterioration. I personally spent 40 minutes of critical care time.   This is time spent at this critically ill patient's bedside actively involved in patient care as well as the coordination of care and discussions with the patient's family. This does not include any procedural time which has been billed separately. Signed By: Malu Vu MD     November 24, 2022         Please note that this dictation may have been completed with Dragon, the computer voice recognition software. Quite often unanticipated grammatical, syntax, homophones, and other interpretive errors are inadvertently transcribed by the computer software. Please disregard these errors. Please excuse any errors that have escaped final proofreading.

## 2022-11-24 NOTE — ED NOTES
Bedside and Verbal shift change report given to Isabel Singh RN (oncoming nurse) by Filiberto Alcazar RN (offgoing nurse). Report included the following information SBAR, Kardex, ED Summary, MAR, and Recent Results.

## 2022-11-24 NOTE — CONSULTS
Urology Consult    Subjective:     Date of Consultation:  November 24, 2022    Referring Physician [address] [place] [address]    Reason for Consultation:  CAP    History of Present Illness:   Patient is a 81 y.o. [place] male who is being seen for prostate cancer mgt. He was admitted to the hospital for Respiratory failure with hypoxia ([facility] [J96.91].   Oliverio [place] down  No precipitating events such as UTI, pain  Takes Zytiga/steroid for mets prostate cancer -local and bladder - nodes and bone on current scans neg    Lactate, Chest CT no PE    CT abd shows bladder lesion suspicious for prostate cancer progression into trigone  Confirmed [place] 10 cancer on cysto cystolitopaxy 10/2022 which prompted [address] note VU scans 8/22 10/22 showed no renal or ureteral stones strongly suggesting subtrigonal progression of disease    NO sx pain  Incontinent w device  Scr 1.53    Discused Gu findings with pt and large extended fam    Past Medical History:   Diagnosis Date    Bladder cancer ([facility]     CAD (coronary artery disease)     MI 2004    Cancer [facility])     prostate s/p xrt    Macular degeneration     MI (myocardial infarction) ([facility] 01/01/2004      Past Surgical History:   Procedure Laterality Date    HX APPENDECTOMY  1955    HX CATARACT REMOVAL      bilateral    HX UROLOGICAL  6/24/15    RIGHT ROBOTIC PARTIAL NEPHRECTOMY  (LATEX ALLERGY), renal exploration, cystectomy x 3    [facility] CARDIAC SURG PROCEDURE UNLIST      2 Stents      Family History   Problem Relation Age of Onset    Alzheimer's Disease Mother     Cancer Father         colon    No Known Problems Sister     No Known Problems Sister     No Known Problems Sister       Social History     Tobacco Use    Smoking status: Every Day     Packs/day: 0.50     Years: 40.00     Pack years: 20.00     Types: Cigarettes    Smokeless tobacco: Never    Tobacco comments:     1 pack every 4 days   Substance Use Topics    Alcohol use: No     Alcohol/week: 0.0 standard drinks     Allergies   Allergen Reactions    Latex Other (comments)     Blistering of the skin      Prior to Admission medications    Medication Sig Start Date End Date Taking? Authorizing Provider   lisinopriL (PRINIVIL, ZESTRIL) 5 mg tablet Take 1 Tablet by mouth daily. 10/24/22  Yes Stephanie Hernandez NP   LORazepam (ATIVAN) 0.5 mg tablet 1 daily prn anxiety 22  Yes Abdon Kim,    predniSONE (DELTASONE) 5 mg tablet TAKE 1 TABLET BY MOUTH ONCE DAILY WITH FOOD 21  Yes Provider, Historical   abiraterone 250 mg tab TAKE 1 TABLET BY MOUTH ONCE DAILY WITH A LOW FAT BREAKFAST 21  Yes Provider, Historical   vit A/vit C/vit E/zinc/copper (PRESERVISION AREDS PO) 2 Tabs. Yes Provider, Historical   Aspirin, Buffered 81 mg tab Take  by mouth. Yes Provider, Historical   saw palmetto 160 mg cap Take 1 Cap by mouth two (2) times a day. Yes Provider, Historical   multivitamin (ONE A DAY) tablet Take 1 Tab by mouth daily. Yes Provider, Historical   lisinopriL (PRINIVIL, ZESTRIL) 5 mg tablet Take 1 tablet by mouth once daily 10/24/22   Stephanie Hernandez NP   bisacodyL (DULCOLAX) 5 mg EC tablet Take 5 mg by mouth daily. Indications: CONSTIPATION  Patient not taking: Reported on 2022    Provider, Historical         Review of Systems:  Pertinent items are noted in HPI. Objective:     Patient Vitals for the past 8 hrs:   BP Temp Pulse Resp SpO2   22 1209 (!) 118/57 98.7 °F (37.1 °C) 84 20 95 %   22 1200 -- -- 87 -- --   22 1000 -- -- 85 -- --   22 0600 127/60 -- 87 16 94 %     Temp (24hrs), Av.4 °F (36.9 °C), Min:98 °F (36.7 °C), Max:98.7 °F (37.1 °C)      Intake and Output:   No intake/output data recorded. Physical Exam:            General:    alert, cooperative, no distress, appears stated age                     Skin:  no rash or abnormalities                HEENT:  Normal        Throat/Neck:  normal and no erythema or exudates noted.     Lymph nodes:  Cervical, supraclavicular, and axillary nodes normal.                 Lungs:  clear to auscultation bilaterally      Cardiovascular:  Regular rate and rhythm and no edema             Abdomen[de-identified]  soft, non-tender. Bowel sounds normal. No masses,  no organomegaly             Genitalia:  penis exam: non focal uncircumcised, device Purewick in place          Extremities:  negative       Assessment:     Prostate cancer mets to bone/bladder on ADT Zytiga Steroid  Rt hydro suspect ASX small stone in tunnel  No SSX of infection sepsis. Uncertain how hypoxia related - richter neg so fa          Plan:     Monitor renal function - if worsens would need cysto and ATTEMPT at rt stent  His seeds anatomy and suspected local recurrence of CAprostate at trigone BN is relevant and is likely to  limit surgical mgt by cysto and stent RK. Alt IR R nephrostomy tube primarily is a consideration (local), bypasses recurrence zone. Last option observe since ASX and low Scr < 2  No need for akins latex allergic noted  4. Review records and other imaging if available at VU  5. Ok to [facility], NOT steroid (can get addisonian)  Many Q's from family today.  Dr Curtis Olvera is primary Yesica Butts w his care

## 2022-11-25 ENCOUNTER — APPOINTMENT (OUTPATIENT)
Dept: NON INVASIVE DIAGNOSTICS | Age: 82
DRG: 189 | End: 2022-11-25
Attending: INTERNAL MEDICINE
Payer: MEDICARE

## 2022-11-25 LAB
ALBUMIN SERPL-MCNC: 3.1 G/DL (ref 3.5–5)
ALBUMIN/GLOB SERPL: 0.9 {RATIO} (ref 1.1–2.2)
ALP SERPL-CCNC: 78 U/L (ref 45–117)
ALT SERPL-CCNC: 29 U/L (ref 12–78)
ANION GAP SERPL CALC-SCNC: 6 MMOL/L (ref 5–15)
AST SERPL-CCNC: 20 U/L (ref 15–37)
BASOPHILS # BLD: 0 K/UL (ref 0–0.1)
BASOPHILS NFR BLD: 0 % (ref 0–1)
BILIRUB SERPL-MCNC: 0.6 MG/DL (ref 0.2–1)
BUN SERPL-MCNC: 19 MG/DL (ref 6–20)
BUN/CREAT SERPL: 18 (ref 12–20)
CALCIUM SERPL-MCNC: 9.1 MG/DL (ref 8.5–10.1)
CHLORIDE SERPL-SCNC: 112 MMOL/L (ref 97–108)
CO2 SERPL-SCNC: 24 MMOL/L (ref 21–32)
CREAT SERPL-MCNC: 1.05 MG/DL (ref 0.7–1.3)
DIFFERENTIAL METHOD BLD: ABNORMAL
EOSINOPHIL # BLD: 0.1 K/UL (ref 0–0.4)
EOSINOPHIL NFR BLD: 1 % (ref 0–7)
ERYTHROCYTE [DISTWIDTH] IN BLOOD BY AUTOMATED COUNT: 12.6 % (ref 11.5–14.5)
GLOBULIN SER CALC-MCNC: 3.4 G/DL (ref 2–4)
GLUCOSE SERPL-MCNC: 122 MG/DL (ref 65–100)
HCT VFR BLD AUTO: 35.2 % (ref 36.6–50.3)
HGB BLD-MCNC: 11.6 G/DL (ref 12.1–17)
IMM GRANULOCYTES # BLD AUTO: 0.1 K/UL (ref 0–0.04)
IMM GRANULOCYTES NFR BLD AUTO: 1 % (ref 0–0.5)
LYMPHOCYTES # BLD: 0.9 K/UL (ref 0.8–3.5)
LYMPHOCYTES NFR BLD: 8 % (ref 12–49)
MCH RBC QN AUTO: 32.2 PG (ref 26–34)
MCHC RBC AUTO-ENTMCNC: 33 G/DL (ref 30–36.5)
MCV RBC AUTO: 97.8 FL (ref 80–99)
MONOCYTES # BLD: 0.6 K/UL (ref 0–1)
MONOCYTES NFR BLD: 5 % (ref 5–13)
NEUTS SEG # BLD: 9.4 K/UL (ref 1.8–8)
NEUTS SEG NFR BLD: 85 % (ref 32–75)
NRBC # BLD: 0 K/UL (ref 0–0.01)
NRBC BLD-RTO: 0 PER 100 WBC
PLATELET # BLD AUTO: 153 K/UL (ref 150–400)
PMV BLD AUTO: 9.8 FL (ref 8.9–12.9)
POTASSIUM SERPL-SCNC: 3.8 MMOL/L (ref 3.5–5.1)
PROT SERPL-MCNC: 6.5 G/DL (ref 6.4–8.2)
RBC # BLD AUTO: 3.6 M/UL (ref 4.1–5.7)
SODIUM SERPL-SCNC: 142 MMOL/L (ref 136–145)
WBC # BLD AUTO: 11 K/UL (ref 4.1–11.1)

## 2022-11-25 PROCEDURE — 74011250637 HC RX REV CODE- 250/637: Performed by: HOSPITALIST

## 2022-11-25 PROCEDURE — 65270000046 HC RM TELEMETRY

## 2022-11-25 PROCEDURE — 74011250636 HC RX REV CODE- 250/636: Performed by: FAMILY MEDICINE

## 2022-11-25 PROCEDURE — 97530 THERAPEUTIC ACTIVITIES: CPT

## 2022-11-25 PROCEDURE — 74011250636 HC RX REV CODE- 250/636: Performed by: HOSPITALIST

## 2022-11-25 PROCEDURE — 94640 AIRWAY INHALATION TREATMENT: CPT

## 2022-11-25 PROCEDURE — 74011636637 HC RX REV CODE- 636/637: Performed by: HOSPITALIST

## 2022-11-25 PROCEDURE — 77010033678 HC OXYGEN DAILY

## 2022-11-25 PROCEDURE — 36415 COLL VENOUS BLD VENIPUNCTURE: CPT

## 2022-11-25 PROCEDURE — 94664 DEMO&/EVAL PT USE INHALER: CPT

## 2022-11-25 PROCEDURE — 97161 PT EVAL LOW COMPLEX 20 MIN: CPT

## 2022-11-25 PROCEDURE — 74011000250 HC RX REV CODE- 250: Performed by: FAMILY MEDICINE

## 2022-11-25 PROCEDURE — 74011000250 HC RX REV CODE- 250: Performed by: INTERNAL MEDICINE

## 2022-11-25 PROCEDURE — 85025 COMPLETE CBC W/AUTO DIFF WBC: CPT

## 2022-11-25 PROCEDURE — 80053 COMPREHEN METABOLIC PANEL: CPT

## 2022-11-25 RX ORDER — IPRATROPIUM BROMIDE AND ALBUTEROL SULFATE 2.5; .5 MG/3ML; MG/3ML
3 SOLUTION RESPIRATORY (INHALATION)
Status: DISCONTINUED | OUTPATIENT
Start: 2022-11-25 | End: 2022-11-27

## 2022-11-25 RX ORDER — AMLODIPINE BESYLATE 5 MG/1
10 TABLET ORAL DAILY
Status: DISCONTINUED | OUTPATIENT
Start: 2022-11-25 | End: 2022-11-26

## 2022-11-25 RX ORDER — ABIRATERONE ACETATE 250 MG/1
TABLET ORAL
Status: DISCONTINUED | OUTPATIENT
Start: 2022-11-26 | End: 2022-11-30 | Stop reason: HOSPADM

## 2022-11-25 RX ORDER — LORAZEPAM 0.5 MG/1
0.5 TABLET ORAL
Status: DISCONTINUED | OUTPATIENT
Start: 2022-11-25 | End: 2022-11-30 | Stop reason: HOSPADM

## 2022-11-25 RX ORDER — ONDANSETRON 2 MG/ML
4 INJECTION INTRAMUSCULAR; INTRAVENOUS
Status: DISCONTINUED | OUTPATIENT
Start: 2022-11-25 | End: 2022-11-30 | Stop reason: HOSPADM

## 2022-11-25 RX ORDER — PREDNISONE 5 MG/1
5 TABLET ORAL
Status: DISCONTINUED | OUTPATIENT
Start: 2022-11-25 | End: 2022-11-30 | Stop reason: HOSPADM

## 2022-11-25 RX ADMIN — IPRATROPIUM BROMIDE AND ALBUTEROL SULFATE 3 ML: .5; 3 SOLUTION RESPIRATORY (INHALATION) at 15:25

## 2022-11-25 RX ADMIN — SODIUM CHLORIDE, PRESERVATIVE FREE 10 ML: 5 INJECTION INTRAVENOUS at 22:51

## 2022-11-25 RX ADMIN — SODIUM CHLORIDE, PRESERVATIVE FREE 10 ML: 5 INJECTION INTRAVENOUS at 14:26

## 2022-11-25 RX ADMIN — SODIUM CHLORIDE 75 ML/HR: 9 INJECTION, SOLUTION INTRAVENOUS at 18:53

## 2022-11-25 RX ADMIN — AZITHROMYCIN DIHYDRATE 500 MG: 500 INJECTION, POWDER, LYOPHILIZED, FOR SOLUTION INTRAVENOUS at 10:59

## 2022-11-25 RX ADMIN — AMLODIPINE BESYLATE 10 MG: 5 TABLET ORAL at 14:26

## 2022-11-25 RX ADMIN — IPRATROPIUM BROMIDE AND ALBUTEROL SULFATE 3 ML: .5; 3 SOLUTION RESPIRATORY (INHALATION) at 21:11

## 2022-11-25 RX ADMIN — HEPARIN SODIUM 5000 UNITS: 5000 INJECTION, SOLUTION INTRAVENOUS; SUBCUTANEOUS at 10:58

## 2022-11-25 RX ADMIN — ONDANSETRON HYDROCHLORIDE 4 MG: 2 SOLUTION INTRAMUSCULAR; INTRAVENOUS at 05:01

## 2022-11-25 RX ADMIN — PREDNISONE 5 MG: 5 TABLET ORAL at 17:13

## 2022-11-25 RX ADMIN — HEPARIN SODIUM 5000 UNITS: 5000 INJECTION, SOLUTION INTRAVENOUS; SUBCUTANEOUS at 19:13

## 2022-11-25 RX ADMIN — SODIUM CHLORIDE, PRESERVATIVE FREE 1 G: 5 INJECTION INTRAVENOUS at 10:59

## 2022-11-25 RX ADMIN — HEPARIN SODIUM 5000 UNITS: 5000 INJECTION, SOLUTION INTRAVENOUS; SUBCUTANEOUS at 05:01

## 2022-11-25 NOTE — PROGRESS NOTES
Problem: Mobility Impaired (Adult and Pediatric)  Goal: *Acute Goals and Plan of Care (Insert Text)  Description: FUNCTIONAL STATUS PRIOR TO ADMISSION: Patient was independent and active without use of DME. When asked he reported no additional falls in the last 12 months and no use of supplemental 02 at baseline. HOME SUPPORT PRIOR TO ADMISSION: The patient lived with family (wife and daughter) but did not require assist.    Physical Therapy Goals  Initiated 11/25/2022  1. Patient will move from supine to sit and sit to supine , scoot up and down, and roll side to side in bed with independence within 7 day(s). 2.  Patient will transfer from bed to chair and chair to bed with modified independence using the least restrictive device within 7 day(s). 3.  Patient will perform sit to stand with modified independence within 7 day(s). 4.  Patient will ambulate with modified independence for 150 feet with the least restrictive device within 7 day(s). 5.  Patient will ascend/descend 13 stairs with one handrail(s) with modified independence within 7 day(s). Outcome: Progressing Towards Goal   PHYSICAL THERAPY EVALUATION  Patient: Amilcar Lamb (18 y.o. male)  Date: 11/25/2022  Primary Diagnosis: Respiratory failure with hypoxia ([facility] [J96.91]       Precautions:   Fall, Bed Alarm, Contact    ASSESSMENT  Based on the objective data described below, the patient presents with impaired sitting and standing balance and need for assist with all aspects of mobility, not at his baseline. He was admitted with a diagnosis of respiratory failure and is s/p a fall on stairs just prior to admission (fell backwards and hit his head). Initially he declined this eval but with encouragement he was agreeable. He required increased time for all of task completion but was able to step over to the chair and remain up to the chair post session.  Of note, he was on 5 liters of 02 during eval (baseline is room air) and Sp02 was stable, see chart below. Per pulmonary: \"pt has centrilobular emphysema on CT chest and active long term smoker\". I recommend a trial use of a rolling walker. Anticipate slow steady gains and likely need for short term rehab. I highly recommend that he is up to the chair for meals. I also highly recommend an OT consult. Vitals:    11/25/22 1332 11/25/22 1347 11/25/22 1355    BP: (!) 143/72 (!) 146/70 (!) 141/72    BP 1 Location: Left upper arm Left upper arm Left upper arm    BP Patient Position: Semi fowlers Sitting  Comment: EOB Sitting  Comment: up to chair    Pulse: 82 86 89    Temp:       Resp:       Height:       Weight:       SpO2: on 5 liters of 02 96% 94% 95%             Current Level of Function Impacting Discharge (mobility/balance): min assist with impaired sitting and standing balance. Functional Outcome Measure: The patient scored 0 on the TUG (unable to complete) outcome measure which is indicative of increased fall risk. .      Other factors to consider for discharge: Two story home, room air is baseline, and per urology note \"prostate cancer mets to bone/bladder\"     Patient will benefit from skilled therapy intervention to address the above noted impairments. PLAN :  Recommendations and Planned Interventions: bed mobility training, transfer training, gait training, therapeutic exercises, patient and family training/education, and therapeutic activities      Frequency/Duration: Patient will be followed by physical therapy:  5 times a week to address goals. Recommendation for discharge: (in order for the patient to meet his/her long term goals)  SNF for rehab, if he discharges to home then recommend physical therapy at least 2 days/week in the home AND ensure assist and/or supervision for safety with all of mobility.     This discharge recommendation:  A follow-up discussion with the attending provider and/or case management is planned    IF patient discharges home will need the following DME: to be determined (TBD), possibly a rolling walker         SUBJECTIVE:   Patient stated 10 minutes, when I asked him how long she thinks he should remain up to the chair before returning to bed. I encouraged him to try to stay up for 30 minutes. OBJECTIVE DATA SUMMARY:   Consult received, chart reviewed, pt cleared by nursing  HISTORY:    Past Medical History:   Diagnosis Date    Bladder cancer ([facility]     CAD (coronary artery disease)     MI 2004    Cancer [facility])     prostate s/p xrt    Macular degeneration     [facility]/2004     Past Surgical History:   Procedure Laterality Date    HX APPENDECTOMY  1955    HX CATARACT REMOVAL      bilateral    HX UROLOGICAL  6/24/15    RIGHT ROBOTIC PARTIAL NEPHRECTOMY  (LATEX ALLERGY), renal exploration, cystectomy x 3    [place] CARDIAC SURG PROCEDURE UNLIST      2 Stents       Personal factors and/or comorbidities impacting plan of care: Two story home, room air is baseline, and per urology note \"prostate cancer mets to bone/bladder\"    Home Situation  Home Environment: Private residence  # Steps to Enter: 3  Rails to Enter: Yes  Hand Rails : Bilateral  Wheelchair Ramp: No  One/Two Story Residence: Two story  # of Interior Steps: 13  Interior Rails: Right  Lift Chair Available: No  Living Alone: No  Support Systems: Other Family Member(s)  Patient Expects to be Discharged to[de-identified] Home  Current DME Used/Available at Home: None    EXAMINATION/PRESENTATION/DECISION MAKING:   Critical Behavior:  Neurologic State: Alert  Orientation Level: Oriented X4  Cognition: Follows commands  Safety/Judgement: Fall prevention  Hearing:   Auditory  Auditory Impairment: None  Skin:  refer to MD and nursing notes  Edema: slight left foot  Range Of Motion:  AROM: Generally decreased, functional                       Strength:    Strength: Generally decreased, functional                    Tone & Sensation:                  Sensation: Intact Coordination:     Vision:      Functional Mobility:  Bed Mobility:     Supine to Sit: Moderate assistance;Assist x1;Bed Modified; Adaptive equipment; Additional time        Transfers:  Sit to Stand: Minimum assistance;Assist x1  Stand to Sit: Minimum assistance;Assist x1        Bed to Chair: Minimum assistance;Assist x1              Balance:   Sitting: Impaired; Without support  Sitting - Static: Fair (occasional)  Sitting - Dynamic: Fair (occasional)  Standing: Impaired; With support  Standing - Static: Constant support; Fair  Standing - Dynamic : Constant support; Fair  Ambulation/Gait Training:                                                      None beyond bed to chair    Stairs: Therapeutic Exercises:       Functional Measure:  Timed up and go:    Timed Get Up And Go Test:  (unable to complete)       < than 10 seconds=Normal  Greater then 13.5 seconds (in elderly)=Increased fall risk   Sonu Hanley [place] M. Predicting the probability for falls in community dwelling older adults using the Timed Up and Go Test. Phys Ther. 2000;80:896-903.              Physical Therapy Evaluation Charge Determination   History Examination Presentation Decision-Making   HIGH Complexity :3+ comorbidities / personal factors will impact the outcome/ POC  MEDIUM Complexity : 3 Standardized tests and measures addressing body structure, function, activity limitation and / or participation in recreation  LOW Complexity : Stable, uncomplicated  LOW Complexity : FOTO score of       Based on the above components, the patient evaluation is determined to be of the following complexity level: LOW     Pain Rating:  None rated    Activity Tolerance:   Good and SpO2 stable on 5 liters of 02    After treatment patient left in no apparent distress:   Sitting in chair, Call bell within reach, Bed / chair alarm activated, and Caregiver / family present    COMMUNICATION/EDUCATION:   The patients plan of care was discussed with: Registered nurse. Fall prevention education was provided and the patient/caregiver indicated understanding., Patient/family have participated as able in goal setting and plan of care. , and Patient/family agree to work toward stated goals and plan of care.     Thank you for this referral.  Jasiel Kent   Time Calculation: 45 mins

## 2022-11-25 NOTE — ROUTINE PROCESS
Bedside shift change report given to Tasha Le LPN (oncoming nurse) by Nadia Sparrow RN (offgoing nurse). Report included the following information SBAR, Kardex, ED Summary, Procedure Summary, Intake/Output, MAR, Accordion, Recent Results, Med Rec Status, Cardiac Rhythm , NSR, Alarm Parameters , Pre Procedure Checklist, Procedure Verification, Quality Measures, and Dual Neuro Assessment.

## 2022-11-25 NOTE — PROGRESS NOTES
[facility] [facility] Adult  Hospitalist Group                                                                                          Hospitalist Progress Note  Ronnell Henry MD  Answering service: [phone] OR [phone] from in house phone        Date of Service:  2022  NAME:  Pasha Hills  :  1940  MRN:  [id number]      Admission Summary:   Patient presents to the ER complaining of fall, history of prostate cancer, presents to the ER with fall, patient reports that he was walking down the stairs, tripped and fell backwards, hitting his head, did not lose consciousness, denies any pain or discomfort anywhere, had 2 episodes of nausea and vomiting, got concerned and came to the ER, patient was found to be hypoxic in the ER, and was requested to be admitted to the hospitalist service, denies any other complaints or problems          Interval history / Subjective:   Still hypoxia requiring O2. Mild lower back pain. Denied hematuria      Assessment & Plan:      Acute vs chronic hypoxic resp failure- Chest CTA reviewed: no pna, no PE, pulmonologist consulted:  Pt has centrilobular emphysema on CT chest and active long term smoker- suspect COPD is contributing to physiology of hypoxia but pt not appearing to have an AECOPD. Another DDX is a PFO with right to left shunting. Added nebs tx. O2. Incentive spirometery, echo pending to eval EF and with bubble study. GLF- likely due to hypoxia, PT eval.   LYNSEY: renal function improved after hydration. May restart his lisinnoiral in am if cr stable   Rt hydroureter : suspect ASX small stone in tunnel, urologist consulted. Renal function improved 1.05 (1.53) and he is not symptomatic ~ no need for stent or NT at this time , urologist following            Prostate cancer hx  CAD            Code status: full   Prophylaxis: sc heparin   Care Plan discussed with: daughter, son and wife   Anticipated Disposition: TBD, PT pending.       Hospital Problems  Date Reviewed: 6/7/2022            Codes Class Noted POA    Respiratory failure with hypoxia [facility]) ICD-10-CM: J96.91  ICD-9-CM: 518.81  11/24/2022 Unknown             Review of Systems:   A comprehensive review of systems was negative except for that written in the HPI. Vital Signs:    Last 24hrs VS reviewed since prior progress note. Most recent are:  Visit Vitals  BP (!) 141/72 (BP 1 Location: Left upper arm, BP Patient Position: Sitting)   Pulse 86   Temp 98.5 °F (36.9 °C)   Resp 18   Ht 5' 9\" (1.753 m)   Wt 80.5 kg (177 lb 7.5 oz)   SpO2 95%   BMI 26.21 kg/m²         Intake/Output Summary (Last 24 hours) at 11/25/2022 1436  Last data filed at 11/24/2022 2341  Gross per 24 hour   Intake --   Output 500 ml   Net -500 ml        Physical Examination:     I had a face to face encounter with this patient and independently examined them on 11/25/2022 as outlined below:          Constitutional:  No acute distress, cooperative, pleasant , ON o2    ENT:  Oral mucosa moist, oropharynx benign. Resp:  Coarse bs, no wheezing. AE ok. CV:  Regular rhythm, normal rate, no murmurs, gallops, rubs    GI:  Soft, non distended, non tender. normoactive bowel sounds, no hepatosplenomegaly     Musculoskeletal:  No edema, warm, 2+ pulses throughout    Neurologic:  Moves all extremities. AAOx3, CN II-XII reviewed            Data Review:    Review and/or order of clinical lab test      Labs:     Recent Labs     11/25/22 0358 11/24/22 0104   WBC 11.0 17.4*   HGB 11.6* 13.1   HCT 35.2* 40.2    203     Recent Labs     11/25/22 0358 11/24/22 0104    142   K 3.8 3.9   * 105   CO2 24 26   BUN 19 31*   CREA 1.05 1.53*   * 139*   CA 9.1 9.7     Recent Labs     11/25/22 0358 11/24/22  0104   ALT 29 48   AP 78 92   TBILI 0.6 0.4   TP 6.5 7.2   ALB 3.1* 3.9   GLOB 3.4 3.3     No results for input(s): INR, PTP, APTT, INREXT in the last 72 hours.    No results for input(s): FE, TIBC, PSAT, FERR in the last 72 hours. No results found for: FOL, RBCF   No results for input(s): PH, PCO2, PO2 in the last 72 hours. No results for input(s): CPK, CKNDX, TROIQ in the last 72 hours.     No lab exists for component: CPKMB  Lab Results   Component Value Date/Time    Cholesterol, total 238 (H) 08/18/2014 10:07 AM    HDL Cholesterol 38 (L) 08/18/2014 10:07 AM    LDL, calculated 139 (H) 08/18/2014 10:07 AM    Triglyceride 307 (H) 08/18/2014 10:07 AM     Lab Results   Component Value Date/Time    Glucose (POC) 147 (H) 06/24/2015 06:39 PM    Glucose (POC) 101 06/24/2015 08:05 AM     Lab Results   Component Value Date/Time    Color YELLOW/STRAW 11/24/2022 06:02 PM    Appearance CLOUDY (A) 11/24/2022 06:02 PM    Specific gravity 1.025 11/24/2022 06:02 PM    Specific gravity 1.017 09/13/2018 08:23 PM    pH (UA) 5.5 11/24/2022 06:02 PM    Protein 100 (A) 11/24/2022 06:02 PM    Glucose Negative 11/24/2022 06:02 PM    Ketone Negative 11/24/2022 06:02 PM    Bilirubin Negative 11/24/2022 06:02 PM    Urobilinogen 0.2 11/24/2022 06:02 PM    Nitrites Positive (A) 11/24/2022 06:02 PM    Leukocyte Esterase MODERATE (A) 11/24/2022 06:02 PM    Epithelial cells FEW 11/24/2022 06:02 PM    Bacteria 3+ (A) 11/24/2022 06:02 PM    WBC 5-10 11/24/2022 06:02 PM    RBC 0-5 11/24/2022 06:02 PM         Medications Reviewed:     Current Facility-Administered Medications   Medication Dose Route Frequency    ondansetron (ZOFRAN) injection 4 mg  4 mg IntraVENous Q6H PRN    albuterol-ipratropium (DUO-NEB) 2.5 MG-0.5 MG/3 ML  3 mL Nebulization Q6H RT    amLODIPine (NORVASC) tablet 10 mg  10 mg Oral DAILY    sodium chloride (NS) flush 5-40 mL  5-40 mL IntraVENous Q8H    sodium chloride (NS) flush 5-40 mL  5-40 mL IntraVENous PRN    0.9% sodium chloride infusion  75 mL/hr IntraVENous CONTINUOUS    cefTRIAXone (ROCEPHIN) 1 g in 0.9% sodium chloride 10 mL IV syringe  1 g IntraVENous Q24H    azithromycin (ZITHROMAX) 500 mg in 0.9% sodium chloride 250 mL ([id number])  500 mg IntraVENous Q24H    acetaminophen (TYLENOL) tablet 650 mg  650 mg Oral Q4H PRN    heparin (porcine) injection 5,000 Units  5,000 Units SubCUTAneous Q8H    albuterol-ipratropium (DUO-NEB) 2.5 MG-0.5 MG/3 ML  3 mL Nebulization Q4H PRN     ______________________________________________________________________  EXPECTED LENGTH OF STAY: 3d 14h  ACTUAL LENGTH OF STAY:          1                 Robson Abraham MD

## 2022-11-25 NOTE — PROGRESS NOTES
Transition of Care Plan:           - Disposition- Home, independent  - PT consulted, recommendations pending  - Patient currently requiring oxygen (not on home o2)  - PCP, specialist F/U  - 1st IMM provided 11/24        Reason for Admission:  Fall                     RUR Score:        11%             Plan for utilizing home health:        None  PCP: First and Last name:  Rodolfo Perez DO     Name of Practice:    Are you a current patient: Yes/No: Yes   Approximate date of last visit: 6 months ago   Can you participate in a virtual visit with your PCP:                   Yes  Current Advanced Directive/Advance Care Plan: Full Code  None    Healthcare Decision Maker:   Click here to complete [address] including selection of the Healthcare Decision Maker Relationship (ie \"Primary\")             [facility] [phone]                  Transition of Care Plan:           - Disposition- Home, independent  - PT consulted, recommendations pending  - Patient currently requiring oxygen (not on home o2)  - PCP, specialist F/U  - 1st IMM provided 11/24    Donnell Crawford is Carine  81 yr old male who presented to ER post a fall where he hit his head but did not lose consciousness. PMH significant for prostate CA. Patient admitted due to acute on chronic respiratory failure. Demographics, PCP and NOK verified. Patient does not have a MPOA but prefers son Carl Sink as his primary decision maker. Patient reports full independence and denies use of any DME. CM to follow hospital course as patient currently requiring oxygen. Care Management Interventions  PCP Verified by CM:  Yes  Transition of Care Consult (CM Consult): Discharge Planning  Health Maintenance Reviewed: Yes  Physical Therapy Consult: Yes  Occupational Therapy Consult: No  Speech Therapy Consult: No  Support Systems: Other Family Member(s)  The Plan for Transition of Care is Related to the Following Treatment Goals : PT consult  The Patient and/or Patient Representative was Provided with a Choice of Provider and Agrees with the Discharge Plan?: Yes  [address] Provided?: No  Discharge Location  Patient Expects to be Discharged to[de-identified] Home     Latha Wilcox ACM-GLENNY  Case Management [address]  C: [phone]

## 2022-11-25 NOTE — CONSULTS
Pulmonary, Critical Care, and Sleep Medicine    Initial Patient Consult    Name: Jenifer Diaz MRN: [id number]   : 1940 [facility]   Date: 2022        IMPRESSION:   Acute vs chronic hypoxic resp failure- Pt has centrilobular emphysema on CT chest and active long term smoker- I suspect COPD is contributing to physiology of hypoxia but pt not appearing to have an AECOPD. Another DDX is a PFO with right to left shunting  GLF- likely due to hypoxia  Prostate cancer hx  CAD  Right hydroureter. RECOMMENDATIONS:   Get ECHO with bubble study  Start on nebs here  D/c on symbicort- will get PFTS as outpt  LYNSEY resolved  Family at bedside     Subjective:     Patient is a 81 y.o. male h/p prostate cancer/CADans a> 40 pk year smoker admitted after GLF( negative CT head/neck and no LOC) Pt here found to be hypoxic on RA ( 81%) Placed on NC O2. Pt denies h/o lung diseases , on no O2 at home. No cough or CP. Past Medical History:   Diagnosis Date    Bladder cancer ([facility] [address]     CAD (coronary artery disease)     MI     Cancer [facility])     prostate s/p xrt    Macular degeneration     MI (myocardial infarction) ([facility] [address] 2004      Past Surgical History:   Procedure Laterality Date    HX APPENDECTOMY      HX CATARACT REMOVAL      bilateral    HX UROLOGICAL  6/24/15    RIGHT ROBOTIC PARTIAL NEPHRECTOMY  (LATEX ALLERGY), renal exploration, cystectomy x 3    CT CARDIAC SURG PROCEDURE UNLIST      2 Stents      Prior to Admission medications    Medication Sig Start Date End Date Taking? Authorizing Provider   lisinopriL (PRINIVIL, ZESTRIL) 5 mg tablet Take 1 Tablet by mouth daily.  10/24/22  Yes Margarette Bernheim, NP   LORazepam (ATIVAN) 0.5 mg tablet 1 daily prn anxiety 22  Yes Abdon Kim DO   predniSONE (DELTASONE) 5 mg tablet TAKE 1 TABLET BY MOUTH ONCE DAILY WITH FOOD 21  Yes Provider, Historical   abiraterone 250 mg tab TAKE 1 TABLET BY MOUTH ONCE DAILY WITH A LOW FAT BREAKFAST 7/2/21  Yes Provider, Historical   vit A/vit C/vit E/zinc/copper (PRESERVISION AREDS PO) 2 Tabs. Yes Provider, Historical   Aspirin, Buffered 81 mg tab Take  by mouth. Yes Provider, Historical   saw palmetto 160 mg cap Take 1 Cap by mouth two (2) times a day. Yes Provider, Historical   multivitamin (ONE A DAY) tablet Take 1 Tab by mouth daily. Yes Provider, Historical   lisinopriL (PRINIVIL, ZESTRIL) 5 mg tablet Take 1 tablet by mouth once daily 10/24/22   Margarette Bernheim, NP   bisacodyL (DULCOLAX) 5 mg EC tablet Take 5 mg by mouth daily. Indications: CONSTIPATION  Patient not taking: Reported on 11/24/2022    Provider, Historical     Allergies   Allergen Reactions    Latex Other (comments)     Blistering of the skin      Social History     Tobacco Use    Smoking status: Every Day     Packs/day: 0.50     Years: 40.00     Pack years: 20.00     Types: Cigarettes    Smokeless tobacco: Never    Tobacco comments:     1 pack every 4 days   Substance Use Topics    Alcohol use: No     Alcohol/week: 0.0 standard drinks      Family History   Problem Relation Age of Onset    Alzheimer's Disease Mother     Cancer Father         colon    No Known Problems Sister     No Known Problems Sister     No Known Problems Sister         Current Facility-Administered Medications   Medication Dose Route Frequency    sodium chloride (NS) flush 5-40 mL  5-40 mL IntraVENous Q8H    0.9% sodium chloride infusion  75 mL/hr IntraVENous CONTINUOUS    cefTRIAXone (ROCEPHIN) 1 g in 0.9% sodium chloride 10 mL IV syringe  1 g IntraVENous Q24H    azithromycin (ZITHROMAX) 500 mg in 0.9% sodium chloride 250 mL ([id number])  500 mg IntraVENous Q24H    heparin (porcine) injection 5,000 Units  5,000 Units SubCUTAneous Q8H       Review of Systems:  A comprehensive review of systems was negative except for that written in the HPI.     Objective:   Vital Signs:    Visit Vitals  /68 (BP 1 Location: Left upper arm, BP Patient Position: At rest) Pulse 81   Temp 97.1 °F (36.2 °C)   Resp 17   Ht 5' 9\" (1.753 m)   Wt 80.5 kg (177 lb 7.5 oz)   SpO2 91%   BMI 26.21 kg/m²       O2 Device: Nasal cannula   O2 Flow Rate (L/min): 4 l/min   Temp (24hrs), Av.6 °F (36.4 °C), Min:97.1 °F (36.2 °C), Max:98.7 °F (37.1 °C)       Intake/Output:   Last shift:      No intake/output data recorded. Last 3 shifts:  1901 -  0700  In: 0   Out: 545 [Urine:500]    Intake/Output Summary (Last 24 hours) at 2022 1138  Last data filed at 2022 2341  Gross per 24 hour   Intake --   Output 545 ml   Net -545 ml      Physical Exam:   General:  Alert, cooperative, no distress, appears stated age. Head:  Normocephalic, without obvious abnormality, atraumatic. Eyes:  Conjunctivae/corneas clear. Nose: Nares normal. Septum midline. Neck: Supple, symmetrical, trachea midline,       Lungs:   Clear to auscultation bilaterally. Chest wall:  No tenderness or deformity. Heart:  Regular rate and rhythm, S1, S2 normal, no murmur, click, rub or gallop. Abdomen:   Soft, non-tender. Bowel sounds normal. No masses,  No organomegaly. Extremities: Extremities normal, atraumatic, no cyanosis or edema.                      Data review:     Recent Results (from the past 24 hour(s))   LACTIC ACID    Collection Time: 22 11:44 AM   Result Value Ref Range    Lactic acid 1.6 0.4 - 2.0 MMOL/L   POC G3 - PUL    Collection Time: 22 12:39 PM   Result Value Ref Range    FIO2 (POC) 3 %    pH (POC) 7.43 7.35 - 7.45      pCO2 (POC) 34.5 (L) 35.0 - 45.0 MMHG    pO2 (POC) 62 (L) 80 - 100 MMHG    HCO3 (POC) 23.1 22 - 26 MMOL/L    sO2 (POC) 92.1 92 - 97 %    Base deficit (POC) 0.7 mmol/L    Site RIGHT RADIAL      Device: NASAL CANNULA      Allens test (POC) Positive      Specimen type (POC) ARTERIAL     URINALYSIS W/MICROSCOPIC    Collection Time: 22  6:02 PM   Result Value Ref Range    Color YELLOW/STRAW      Appearance CLOUDY (A) CLEAR      Specific gravity 1.025      pH (UA) 5.5 5.0 - 8.0      Protein 100 (A) NEG mg/dL    Glucose Negative NEG mg/dL    Ketone Negative NEG mg/dL    Bilirubin Negative NEG      Blood MODERATE (A) NEG      Urobilinogen 0.2 0.2 - 1.0 EU/dL    Nitrites Positive (A) NEG      Leukocyte Esterase MODERATE (A) NEG      WBC 5-10 0 - 4 /hpf    RBC 0-5 0 - 5 /hpf    Epithelial cells FEW FEW /lpf    Bacteria 3+ (A) NEG /hpf   TROPONIN-HIGH SENSITIVITY    Collection Time: 11/24/22  7:23 PM   Result Value Ref Range    Troponin-High Sensitivity 52 0 - 76 ng/L   METABOLIC PANEL, COMPREHENSIVE    Collection Time: 11/25/22  3:58 AM   Result Value Ref Range    Sodium 142 136 - 145 mmol/L    Potassium 3.8 3.5 - 5.1 mmol/L    Chloride 112 (H) 97 - 108 mmol/L    CO2 24 21 - 32 mmol/L    Anion gap 6 5 - 15 mmol/L    Glucose 122 (H) 65 - 100 mg/dL    BUN 19 6 - 20 MG/DL    Creatinine 1.05 0.70 - 1.30 MG/DL    BUN/Creatinine ratio 18 12 - 20      eGFR >60 >60 ml/min/1.73m2    Calcium 9.1 8.5 - 10.1 MG/DL    Bilirubin, total 0.6 0.2 - 1.0 MG/DL    ALT (SGPT) 29 12 - 78 U/L    AST (SGOT) 20 15 - 37 U/L    Alk. phosphatase 78 45 - 117 U/L    Protein, total 6.5 6.4 - 8.2 g/dL    Albumin 3.1 (L) 3.5 - 5.0 g/dL    Globulin 3.4 2.0 - 4.0 g/dL    A-G Ratio 0.9 (L) 1.1 - 2.2     CBC WITH AUTOMATED DIFF    Collection Time: 11/25/22  3:58 AM   Result Value Ref Range    WBC 11.0 4.1 - 11.1 K/uL    RBC 3.60 (L) 4.10 - 5.70 M/uL    HGB 11.6 (L) 12.1 - 17.0 g/dL    HCT 35.2 (L) 36.6 - 50.3 %    MCV 97.8 80.0 - 99.0 FL    MCH 32.2 26.0 - 34.0 PG    MCHC 33.0 30.0 - 36.5 g/dL    RDW 12.6 11.5 - 14.5 %    PLATELET 612 059 - 941 K/uL    MPV 9.8 8.9 - 12.9 FL    NRBC 0.0 0  WBC    ABSOLUTE NRBC 0.00 0.00 - 0.01 K/uL    NEUTROPHILS 85 (H) 32 - 75 %    LYMPHOCYTES 8 (L) 12 - 49 %    MONOCYTES 5 5 - 13 %    EOSINOPHILS 1 0 - 7 %    BASOPHILS 0 0 - 1 %    IMMATURE GRANULOCYTES 1 (H) 0.0 - 0.5 %    ABS. NEUTROPHILS 9.4 (H) 1.8 - 8.0 K/UL    ABS.  LYMPHOCYTES 0.9 0.8 - 3.5 K/UL ABS. MONOCYTES 0.6 0.0 - 1.0 K/UL    ABS. EOSINOPHILS 0.1 0.0 - 0.4 K/UL    ABS. BASOPHILS 0.0 0.0 - 0.1 K/UL    ABS. IMM.  GRANS. 0.1 (H) 0.00 - 0.04 K/UL    DF AUTOMATED         Imaging:  I have personally reviewed the patients radiographs and have reviewed the reports:  CTA chest no PE and + CL emphysema no fibrosis no lung masses no effusions no ILD changes        Kourtney Rosen MD

## 2022-11-25 NOTE — PROGRESS NOTES
Urology Progress Note    Patient: Kentrell Zavala MRN: [id number]  SSN: [id number]    YOB: 1940  Age: 81 y.o. Sex: male            Assessment:   Prostate cancer mets to bone/bladder on ADT Zytiga Steroid  Rt hydro suspect ASX small stone in tunnel  3. No SSX of infection sepsis. Uncertain how hypoxia related - richter neg so far      Plan:     Renal function improved 1.05 (1.53) and he is not symptomatic ~ no need for stent or NT at this time      11/24/22  His seeds anatomy and suspected local recurrence of CAprostate at trigone BN is relevant and is likely to  limit surgical mgt by cysto and stent RK. Alt IR R nephrostomy tube primarily is a consideration (local), bypasses recurrence zone.  Last option observe since ASX and low Scr < 2  No need for akins latex allergic noted  Ok to [employer], NOT steroid (can get addisonian)    Subjective:     No new complaints     Objective:     Visit Vitals  /68 (BP 1 Location: Left upper arm, BP Patient Position: At rest)   Pulse 83   Temp 97.1 °F (36.2 °C)   Resp 17   Ht 5' 9\" (1.753 m)   Wt 80.5 kg (177 lb 7.5 oz)   SpO2 91%   BMI 26.21 kg/m²         Intake/Output Summary (Last 24 hours) at 11/25/2022 0901  Last data filed at 11/24/2022 2341  Gross per 24 hour   Intake --   Output 545 ml   Net -545 ml       Physical Exam  General: NAD  HEENT: NC/AT  RESP: ON OXYGEN   Abdomen: soft, NTTP, nondistended, NO suprapubic fullness or tenderness  : INCONTINENT   Neuro: Appropriate  Mood/Affect: normal    Recent Results (from the past 24 hour(s))   TROPONIN-HIGH SENSITIVITY    Collection Time: 11/24/22 11:37 AM   Result Value Ref Range    Troponin-High Sensitivity 71 0 - 76 ng/L   PROCALCITONIN    Collection Time: 11/24/22 11:37 AM   Result Value Ref Range    Procalcitonin 2.07 ng/mL   RESPIRATORY VIRUS PANEL W/COVID-19, PCR    Collection Time: 11/24/22 11:37 AM    Specimen: Nasopharyngeal   Result Value Ref Range Adenovirus Not detected NOTD      Coronavirus 229E Not detected NOTD      Coronavirus HKU1 Not detected NOTD      Coronavirus CVNL63 Not detected NOTD      Coronavirus OC43 Not detected NOTD      SARS-CoV-2, PCR Not detected NOTD      Metapneumovirus Not detected NOTD      Rhinovirus and Enterovirus Not detected NOTD      Influenza A Not detected NOTD      Influenza B Not detected NOTD      Parainfluenza 1 Not detected NOTD      Parainfluenza 2 Not detected NOTD      Parainfluenza 3 Not detected NOTD      Parainfluenza virus 4 Not detected NOTD      RSV by PCR Not detected NOTD      B. parapertussis, PCR Not detected NOTD      Bordetella pertussis - PCR Not detected NOTD      Chlamydophila pneumoniae DNA, QL, PCR Not detected NOTD      Mycoplasma pneumoniae DNA, QL, PCR Not detected NOTD     LACTIC ACID    Collection Time: 11/24/22 11:44 AM   Result Value Ref Range    Lactic acid 1.6 0.4 - 2.0 MMOL/L   POC G3 - PUL    Collection Time: 11/24/22 12:39 PM   Result Value Ref Range    FIO2 (POC) 3 %    pH (POC) 7.43 7.35 - 7.45      pCO2 (POC) 34.5 (L) 35.0 - 45.0 MMHG    pO2 (POC) 62 (L) 80 - 100 MMHG    HCO3 (POC) 23.1 22 - 26 MMOL/L    sO2 (POC) 92.1 92 - 97 %    Base deficit (POC) 0.7 mmol/L    Site RIGHT RADIAL      Device: NASAL CANNULA      Allens test (POC) Positive      Specimen type (POC) ARTERIAL     URINALYSIS W/MICROSCOPIC    Collection Time: 11/24/22  6:02 PM   Result Value Ref Range    Color YELLOW/STRAW      Appearance CLOUDY (A) CLEAR      Specific gravity 1.025      pH (UA) 5.5 5.0 - 8.0      Protein 100 (A) NEG mg/dL    Glucose Negative NEG mg/dL    Ketone Negative NEG mg/dL    Bilirubin Negative NEG      Blood MODERATE (A) NEG      Urobilinogen 0.2 0.2 - 1.0 EU/dL    Nitrites Positive (A) NEG      Leukocyte Esterase MODERATE (A) NEG      WBC 5-10 0 - 4 /hpf    RBC 0-5 0 - 5 /hpf    Epithelial cells FEW FEW /lpf    Bacteria 3+ (A) NEG /hpf   TROPONIN-HIGH SENSITIVITY    Collection Time: 11/24/22  7:23 PM   Result Value Ref Range    Troponin-High Sensitivity 52 0 - 76 ng/L   METABOLIC PANEL, COMPREHENSIVE    Collection Time: 11/25/22  3:58 AM   Result Value Ref Range    Sodium 142 136 - 145 mmol/L    Potassium 3.8 3.5 - 5.1 mmol/L    Chloride 112 (H) 97 - 108 mmol/L    CO2 24 21 - 32 mmol/L    Anion gap 6 5 - 15 mmol/L    Glucose 122 (H) 65 - 100 mg/dL    BUN 19 6 - 20 MG/DL    Creatinine 1.05 0.70 - 1.30 MG/DL    BUN/Creatinine ratio 18 12 - 20      eGFR >60 >60 ml/min/1.73m2    Calcium 9.1 8.5 - 10.1 MG/DL    Bilirubin, total 0.6 0.2 - 1.0 MG/DL    ALT (SGPT) 29 12 - 78 U/L    AST (SGOT) 20 15 - 37 U/L    Alk. phosphatase 78 45 - 117 U/L    Protein, total 6.5 6.4 - 8.2 g/dL    Albumin 3.1 (L) 3.5 - 5.0 g/dL    Globulin 3.4 2.0 - 4.0 g/dL    A-G Ratio 0.9 (L) 1.1 - 2.2     CBC WITH AUTOMATED DIFF    Collection Time: 11/25/22  3:58 AM   Result Value Ref Range    WBC 11.0 4.1 - 11.1 K/uL    RBC 3.60 (L) 4.10 - 5.70 M/uL    HGB 11.6 (L) 12.1 - 17.0 g/dL    HCT 35.2 (L) 36.6 - 50.3 %    MCV 97.8 80.0 - 99.0 FL    MCH 32.2 26.0 - 34.0 PG    MCHC 33.0 30.0 - 36.5 g/dL    RDW 12.6 11.5 - 14.5 %    PLATELET 244 210 - 409 K/uL    MPV 9.8 8.9 - 12.9 FL    NRBC 0.0 0  WBC    ABSOLUTE NRBC 0.00 0.00 - 0.01 K/uL    NEUTROPHILS 85 (H) 32 - 75 %    LYMPHOCYTES 8 (L) 12 - 49 %    MONOCYTES 5 5 - 13 %    EOSINOPHILS 1 0 - 7 %    BASOPHILS 0 0 - 1 %    IMMATURE GRANULOCYTES 1 (H) 0.0 - 0.5 %    ABS. NEUTROPHILS 9.4 (H) 1.8 - 8.0 K/UL    ABS. LYMPHOCYTES 0.9 0.8 - 3.5 K/UL    ABS. MONOCYTES 0.6 0.0 - 1.0 K/UL    ABS. EOSINOPHILS 0.1 0.0 - 0.4 K/UL    ABS. BASOPHILS 0.0 0.0 - 0.1 K/UL    ABS. IMM. GRANS. 0.1 (H) 0.00 - 0.04 K/UL    DF AUTOMATED         Imaging:  US ABD LTD    Result Date: 11/24/2022  INDICATION:  Nausea with vomiting COMPARISON:  CT earlier today FINDINGS: Limited right upper quadrant ultrasound was performed. The liver contains multiple cysts, the largest measuring 4.9 cm.  The common bile duct is not visualized due to body habitus and bowel gas. The gallbladder is normal. The right kidney measures 12.4 cm in length. There is right-sided hydronephrosis. 1. Right hydronephrosis, as demonstrated on today's CT. 2. Multiple hepatic cysts. 3. Normal gallbladder.       Signed By: Radha Ruiz NP - November 25, 2022

## 2022-11-26 ENCOUNTER — APPOINTMENT (OUTPATIENT)
Dept: NON INVASIVE DIAGNOSTICS | Age: 82
DRG: 189 | End: 2022-11-26
Attending: INTERNAL MEDICINE
Payer: MEDICARE

## 2022-11-26 ENCOUNTER — APPOINTMENT (OUTPATIENT)
Dept: MRI IMAGING | Age: 82
DRG: 189 | End: 2022-11-26
Attending: FAMILY MEDICINE
Payer: MEDICARE

## 2022-11-26 LAB
ALBUMIN SERPL-MCNC: 2.8 G/DL (ref 3.5–5)
ALBUMIN/GLOB SERPL: 0.8 {RATIO} (ref 1.1–2.2)
ALP SERPL-CCNC: 76 U/L (ref 45–117)
ALT SERPL-CCNC: 25 U/L (ref 12–78)
ANION GAP SERPL CALC-SCNC: 8 MMOL/L (ref 5–15)
AST SERPL-CCNC: 14 U/L (ref 15–37)
BASOPHILS # BLD: 0 K/UL (ref 0–0.1)
BASOPHILS NFR BLD: 0 % (ref 0–1)
BILIRUB SERPL-MCNC: 0.5 MG/DL (ref 0.2–1)
BNP SERPL-MCNC: 3109 PG/ML
BUN SERPL-MCNC: 17 MG/DL (ref 6–20)
BUN/CREAT SERPL: 18 (ref 12–20)
CALCIUM SERPL-MCNC: 9.3 MG/DL (ref 8.5–10.1)
CHLORIDE SERPL-SCNC: 109 MMOL/L (ref 97–108)
CO2 SERPL-SCNC: 23 MMOL/L (ref 21–32)
CREAT SERPL-MCNC: 0.96 MG/DL (ref 0.7–1.3)
DIFFERENTIAL METHOD BLD: ABNORMAL
ECHO AR MAX VEL PISA: 4.3 M/S
ECHO AV REGURGITANT PHT: 222.7 MILLISECOND
ECHO RV TAPSE: 2 CM (ref 1.7–?)
EOSINOPHIL # BLD: 0 K/UL (ref 0–0.4)
EOSINOPHIL NFR BLD: 0 % (ref 0–7)
ERYTHROCYTE [DISTWIDTH] IN BLOOD BY AUTOMATED COUNT: 12.1 % (ref 11.5–14.5)
GLOBULIN SER CALC-MCNC: 3.6 G/DL (ref 2–4)
GLUCOSE SERPL-MCNC: 134 MG/DL (ref 65–100)
HCT VFR BLD AUTO: 34.5 % (ref 36.6–50.3)
HGB BLD-MCNC: 11.5 G/DL (ref 12.1–17)
IMM GRANULOCYTES # BLD AUTO: 0.1 K/UL (ref 0–0.04)
IMM GRANULOCYTES NFR BLD AUTO: 1 % (ref 0–0.5)
LYMPHOCYTES # BLD: 0.7 K/UL (ref 0.8–3.5)
LYMPHOCYTES NFR BLD: 6 % (ref 12–49)
MAGNESIUM SERPL-MCNC: 2.1 MG/DL (ref 1.6–2.4)
MCH RBC QN AUTO: 31.6 PG (ref 26–34)
MCHC RBC AUTO-ENTMCNC: 33.3 G/DL (ref 30–36.5)
MCV RBC AUTO: 94.8 FL (ref 80–99)
MONOCYTES # BLD: 0.6 K/UL (ref 0–1)
MONOCYTES NFR BLD: 5 % (ref 5–13)
NEUTS SEG # BLD: 10.1 K/UL (ref 1.8–8)
NEUTS SEG NFR BLD: 88 % (ref 32–75)
NRBC # BLD: 0 K/UL (ref 0–0.01)
NRBC BLD-RTO: 0 PER 100 WBC
PHOSPHATE SERPL-MCNC: 2.6 MG/DL (ref 2.6–4.7)
PLATELET # BLD AUTO: 149 K/UL (ref 150–400)
PMV BLD AUTO: 9.8 FL (ref 8.9–12.9)
POTASSIUM SERPL-SCNC: 3.7 MMOL/L (ref 3.5–5.1)
PROT SERPL-MCNC: 6.4 G/DL (ref 6.4–8.2)
PSA SERPL-MCNC: 13.2 NG/ML (ref 0.01–4)
RBC # BLD AUTO: 3.64 M/UL (ref 4.1–5.7)
RBC MORPH BLD: ABNORMAL
SODIUM SERPL-SCNC: 140 MMOL/L (ref 136–145)
WBC # BLD AUTO: 11.5 K/UL (ref 4.1–11.1)

## 2022-11-26 PROCEDURE — 84100 ASSAY OF PHOSPHORUS: CPT

## 2022-11-26 PROCEDURE — 83735 ASSAY OF MAGNESIUM: CPT

## 2022-11-26 PROCEDURE — 85025 COMPLETE CBC W/AUTO DIFF WBC: CPT

## 2022-11-26 PROCEDURE — 94640 AIRWAY INHALATION TREATMENT: CPT

## 2022-11-26 PROCEDURE — 93306 TTE W/DOPPLER COMPLETE: CPT | Performed by: SPECIALIST

## 2022-11-26 PROCEDURE — 70551 MRI BRAIN STEM W/O DYE: CPT

## 2022-11-26 PROCEDURE — 74011250636 HC RX REV CODE- 250/636: Performed by: FAMILY MEDICINE

## 2022-11-26 PROCEDURE — 83880 ASSAY OF NATRIURETIC PEPTIDE: CPT

## 2022-11-26 PROCEDURE — 94760 N-INVAS EAR/PLS OXIMETRY 1: CPT

## 2022-11-26 PROCEDURE — 74011636637 HC RX REV CODE- 636/637: Performed by: HOSPITALIST

## 2022-11-26 PROCEDURE — 74011250637 HC RX REV CODE- 250/637: Performed by: HOSPITALIST

## 2022-11-26 PROCEDURE — 74011000250 HC RX REV CODE- 250: Performed by: INTERNAL MEDICINE

## 2022-11-26 PROCEDURE — 74011000250 HC RX REV CODE- 250: Performed by: FAMILY MEDICINE

## 2022-11-26 PROCEDURE — 93306 TTE W/DOPPLER COMPLETE: CPT

## 2022-11-26 PROCEDURE — 65270000046 HC RM TELEMETRY

## 2022-11-26 PROCEDURE — 36415 COLL VENOUS BLD VENIPUNCTURE: CPT

## 2022-11-26 PROCEDURE — 84153 ASSAY OF PSA TOTAL: CPT

## 2022-11-26 PROCEDURE — 80053 COMPREHEN METABOLIC PANEL: CPT

## 2022-11-26 RX ORDER — LISINOPRIL 5 MG/1
5 TABLET ORAL DAILY
Status: DISCONTINUED | OUTPATIENT
Start: 2022-11-26 | End: 2022-11-30 | Stop reason: HOSPADM

## 2022-11-26 RX ORDER — CALCIUM CARBONATE 200(500)MG
200 TABLET,CHEWABLE ORAL
Status: DISCONTINUED | OUTPATIENT
Start: 2022-11-26 | End: 2022-11-30 | Stop reason: HOSPADM

## 2022-11-26 RX ADMIN — LISINOPRIL 5 MG: 5 TABLET ORAL at 09:17

## 2022-11-26 RX ADMIN — HEPARIN SODIUM 5000 UNITS: 5000 INJECTION, SOLUTION INTRAVENOUS; SUBCUTANEOUS at 18:49

## 2022-11-26 RX ADMIN — PREDNISONE 5 MG: 5 TABLET ORAL at 09:17

## 2022-11-26 RX ADMIN — HEPARIN SODIUM 5000 UNITS: 5000 INJECTION, SOLUTION INTRAVENOUS; SUBCUTANEOUS at 07:01

## 2022-11-26 RX ADMIN — CALCIUM CARBONATE (ANTACID) CHEW TAB 500 MG 200 MG: 500 CHEW TAB at 18:49

## 2022-11-26 RX ADMIN — AZITHROMYCIN DIHYDRATE 500 MG: 500 INJECTION, POWDER, LYOPHILIZED, FOR SOLUTION INTRAVENOUS at 11:58

## 2022-11-26 RX ADMIN — LORAZEPAM 0.5 MG: 0.5 TABLET ORAL at 00:42

## 2022-11-26 RX ADMIN — IPRATROPIUM BROMIDE AND ALBUTEROL SULFATE 3 ML: .5; 3 SOLUTION RESPIRATORY (INHALATION) at 01:04

## 2022-11-26 RX ADMIN — SODIUM CHLORIDE, PRESERVATIVE FREE 10 ML: 5 INJECTION INTRAVENOUS at 07:01

## 2022-11-26 RX ADMIN — CALCIUM CARBONATE (ANTACID) CHEW TAB 500 MG 200 MG: 500 CHEW TAB at 11:55

## 2022-11-26 RX ADMIN — SODIUM CHLORIDE, PRESERVATIVE FREE 1 G: 5 INJECTION INTRAVENOUS at 11:56

## 2022-11-26 RX ADMIN — IPRATROPIUM BROMIDE AND ALBUTEROL SULFATE 3 ML: .5; 3 SOLUTION RESPIRATORY (INHALATION) at 20:37

## 2022-11-26 RX ADMIN — HEPARIN SODIUM 5000 UNITS: 5000 INJECTION, SOLUTION INTRAVENOUS; SUBCUTANEOUS at 22:31

## 2022-11-26 RX ADMIN — IPRATROPIUM BROMIDE AND ALBUTEROL SULFATE 3 ML: .5; 3 SOLUTION RESPIRATORY (INHALATION) at 09:29

## 2022-11-26 RX ADMIN — SODIUM CHLORIDE, PRESERVATIVE FREE 10 ML: 5 INJECTION INTRAVENOUS at 18:50

## 2022-11-26 RX ADMIN — SODIUM CHLORIDE, PRESERVATIVE FREE 10 ML: 5 INJECTION INTRAVENOUS at 21:50

## 2022-11-26 RX ADMIN — IPRATROPIUM BROMIDE AND ALBUTEROL SULFATE 3 ML: .5; 3 SOLUTION RESPIRATORY (INHALATION) at 14:22

## 2022-11-26 NOTE — PROGRESS NOTES
Adv Prostate cancer  Rt hydro with Scr 0.96, ASX -- related to progression of CAp at [facility] based on BX K darryn 1 months ago  Will check PSA T and have Dr Kiko Vázquez review Monday  Reassurance provided  Pt and family want to hold off on Rt NT or IR madelinal

## 2022-11-26 NOTE — PROGRESS NOTES
[address] [facility]  Hospitalist Group                                                                                          Hospitalist Progress Note  Carlos Franco MD  Answering service: [phone] OR [phone] from in house phone        Date of Service:  2022  NAME:  Antoni Reza  :  1940  MRN:  [id number]      Admission Summary:   Patient presents to the ER complaining of fall, history of prostate cancer, presents to the ER with fall, patient reports that he was walking down the stairs, tripped and fell backwards, hitting his head, did not lose consciousness, denies any pain or discomfort anywhere, had 2 episodes of nausea and vomiting, got concerned and came to the ER, patient was found to be hypoxic in the ER, and was requested to be admitted to the hospitalist service, denies any other complaints or problems          Interval history / Subjective:   Still hypoxia requiring O2. Mild lower back pain. Denied hematuria   Acid reflux      Assessment & Plan:      Acute vs chronic hypoxic resp failure- Chest CTA reviewed: no pna, no PE, pulmonologist consulted:  Pt has centrilobular emphysema on CT chest and active long term smoker- suspect COPD is contributing to physiology of hypoxia but pt not appearing to have an AECOPD. Another DDX is a PFO with right to left shunting. Added nebs tx. O2. Incentive spirometery, echo pending to eval EF and with bubble study. Added pulmocort neb   GLF- likely due to hypoxia, PT eval.   LYNSEY: renal function improved after hydration. May restart his lisinnoiral in am if cr stable . Dc ivf . Rt hydroureter : suspect ASX small stone in tunnel, urologist consulted and following .  Renal function improved 1.05 (1.53) and he is not symptomatic ~ no need for stent or NT at this time , urologist following            Prostate cancer hx: urologist following   CAD            Code status: full   Prophylaxis: sc heparin   Care Plan discussed with: daughter bedside updated. Anticipated Disposition: TBD, PT pending. Hospital Problems  Date Reviewed: 6/7/2022            Codes Class Noted POA    Respiratory failure with hypoxia [facility]) ICD-10-CM: J96.91  ICD-9-CM: 518.81  11/24/2022 Unknown           Review of Systems:   A comprehensive review of systems was negative except for that written in the HPI. Vital Signs:    Last 24hrs VS reviewed since prior progress note. Most recent are:  Visit Vitals  BP (!) 146/64   Pulse 94   Temp 97.6 °F (36.4 °C)   Resp 18   Ht 5' 9\" (1.753 m)   Wt 80.5 kg (177 lb 7.5 oz)   SpO2 95%   BMI 26.21 kg/m²       No intake or output data in the 24 hours ending 11/26/22 1130       Physical Examination:     I had a face to face encounter with this patient and independently examined them on 11/26/2022 as outlined below:          Constitutional:  No acute distress, cooperative, pleasant , ON o2    ENT:  Oral mucosa moist, oropharynx benign. Resp:  Coarse bs, no wheezing. AE ok. CV:  Regular rhythm, normal rate, no murmurs, gallops, rubs    GI:  Soft, non distended, non tender. normoactive bowel sounds, no hepatosplenomegaly     Musculoskeletal:  No edema, warm, 2+ pulses throughout    Neurologic:  Moves all extremities.   AAOx3, CN II-XII reviewed            Data Review:    Review and/or order of clinical lab test      Labs:     Recent Labs     11/26/22 0711 11/25/22 0358   WBC 11.5* 11.0   HGB 11.5* 11.6*   HCT 34.5* 35.2*   * 153       Recent Labs     11/26/22  0711 11/25/22  0358 11/24/22  0104    142 142   K 3.7 3.8 3.9   * 112* 105   CO2 23 24 26   BUN 17 19 31*   CREA 0.96 1.05 1.53*   * 122* 139*   CA 9.3 9.1 9.7   MG 2.1  --   --    PHOS 2.6  --   --        Recent Labs     11/26/22  0711 11/25/22  0358 11/24/22  0104   ALT 25 29 48   AP 76 78 92   TBILI 0.5 0.6 0.4   TP 6.4 6.5 7.2   ALB 2.8* 3.1* 3.9   GLOB 3.6 3.4 3.3       No results for input(s): INR, PTP, APTT, INREXT, INREXT in the last 72 hours. No results for input(s): FE, TIBC, PSAT, FERR in the last 72 hours. No results found for: FOL, RBCF   No results for input(s): PH, PCO2, PO2 in the last 72 hours. No results for input(s): CPK, CKNDX, TROIQ in the last 72 hours. No lab exists for component: CPKMB  Lab Results   Component Value Date/Time    Cholesterol, total 238 (H) 08/18/2014 10:07 AM    HDL Cholesterol 38 (L) 08/18/2014 10:07 AM    LDL, calculated 139 (H) 08/18/2014 10:07 AM    Triglyceride 307 (H) 08/18/2014 10:07 AM     Lab Results   Component Value Date/Time    Glucose (POC) 147 (H) 06/24/2015 06:39 PM    Glucose (POC) 101 06/24/2015 08:05 AM     Lab Results   Component Value Date/Time    Color YELLOW/STRAW 11/24/2022 06:02 PM    Appearance CLOUDY (A) 11/24/2022 06:02 PM    Specific gravity 1.025 11/24/2022 06:02 PM    Specific gravity 1.017 09/13/2018 08:23 PM    pH (UA) 5.5 11/24/2022 06:02 PM    Protein 100 (A) 11/24/2022 06:02 PM    Glucose Negative 11/24/2022 06:02 PM    Ketone Negative 11/24/2022 06:02 PM    Bilirubin Negative 11/24/2022 06:02 PM    Urobilinogen 0.2 11/24/2022 06:02 PM    Nitrites Positive (A) 11/24/2022 06:02 PM    Leukocyte Esterase MODERATE (A) 11/24/2022 06:02 PM    Epithelial cells FEW 11/24/2022 06:02 PM    Bacteria 3+ (A) 11/24/2022 06:02 PM    WBC 5-10 11/24/2022 06:02 PM    RBC 0-5 11/24/2022 06:02 PM         Medications Reviewed:     Current Facility-Administered Medications   Medication Dose Route Frequency    lisinopriL (PRINIVIL, ZESTRIL) tablet 5 mg  5 mg Oral DAILY    ondansetron (ZOFRAN) injection 4 mg  4 mg IntraVENous Q6H PRN    albuterol-ipratropium (DUO-NEB) 2.5 MG-0.5 MG/3 ML  3 mL Nebulization Q6H RT    . PHARMACY TO SUBSTITUTE PER PROTOCOL (Reordered from: abiraterone 250 mg tab)    Per Protocol    LORazepam (ATIVAN) tablet 0.5 mg  0.5 mg Oral DAILY PRN    predniSONE (DELTASONE) tablet 5 mg  5 mg Oral DAILY WITH BREAKFAST    sodium chloride (NS) flush 5-40 mL  5-40 mL IntraVENous Q8H sodium chloride (NS) flush 5-40 mL  5-40 mL IntraVENous PRN    cefTRIAXone (ROCEPHIN) 1 g in 0.9% sodium chloride 10 mL IV syringe  1 g IntraVENous Q24H    azithromycin (ZITHROMAX) 500 mg in 0.9% sodium chloride 250 mL ([id number])  500 mg IntraVENous Q24H    acetaminophen (TYLENOL) tablet 650 mg  650 mg Oral Q4H PRN    heparin (porcine) injection 5,000 Units  5,000 Units SubCUTAneous Q8H    albuterol-ipratropium (DUO-NEB) 2.5 MG-0.5 MG/3 ML  3 mL Nebulization Q4H PRN     ______________________________________________________________________  EXPECTED LENGTH OF STAY: 3d 14h  ACTUAL LENGTH OF STAY:          2                 Ronnell Henry MD

## 2022-11-26 NOTE — PROGRESS NOTES
Bedside shift change report given to Merary Boone RN (oncoming nurse) by Geovani Rodriguez LPN (offgoing nurse). Report included the following information SBAR, Kardex, ED Summary, Procedure Summary, Intake/Output, MAR, Recent Results, and Cardiac Rhythm NSR .

## 2022-11-26 NOTE — PROGRESS NOTES
PT Note:    New orders received and acknowledged. Chart reviewed. Patient was evaluated yesterday 11/25/22, overall min A for mobility. See evaluation for more details. Discharge recommendation per evaluation as follows:  SNF for rehab, if he discharges to home then recommend physical therapy at least 2 days/week in the home AND ensure assist and/or supervision for safety with all of mobility. Will follow up next week.

## 2022-11-27 LAB
ALBUMIN SERPL-MCNC: 2.9 G/DL (ref 3.5–5)
ALBUMIN/GLOB SERPL: 1 {RATIO} (ref 1.1–2.2)
ALP SERPL-CCNC: 75 U/L (ref 45–117)
ALT SERPL-CCNC: 23 U/L (ref 12–78)
ANION GAP SERPL CALC-SCNC: 9 MMOL/L (ref 5–15)
AST SERPL-CCNC: 13 U/L (ref 15–37)
BASOPHILS # BLD: 0 K/UL (ref 0–0.1)
BASOPHILS NFR BLD: 0 % (ref 0–1)
BILIRUB SERPL-MCNC: 0.4 MG/DL (ref 0.2–1)
BUN SERPL-MCNC: 19 MG/DL (ref 6–20)
BUN/CREAT SERPL: 22 (ref 12–20)
CALCIUM SERPL-MCNC: 9.4 MG/DL (ref 8.5–10.1)
CHLORIDE SERPL-SCNC: 109 MMOL/L (ref 97–108)
CO2 SERPL-SCNC: 24 MMOL/L (ref 21–32)
CREAT SERPL-MCNC: 0.85 MG/DL (ref 0.7–1.3)
DIFFERENTIAL METHOD BLD: ABNORMAL
EOSINOPHIL # BLD: 0.1 K/UL (ref 0–0.4)
EOSINOPHIL NFR BLD: 1 % (ref 0–7)
ERYTHROCYTE [DISTWIDTH] IN BLOOD BY AUTOMATED COUNT: 12.1 % (ref 11.5–14.5)
GLOBULIN SER CALC-MCNC: 3 G/DL (ref 2–4)
GLUCOSE SERPL-MCNC: 121 MG/DL (ref 65–100)
HCT VFR BLD AUTO: 34.3 % (ref 36.6–50.3)
HGB BLD-MCNC: 11.5 G/DL (ref 12.1–17)
IMM GRANULOCYTES # BLD AUTO: 0 K/UL (ref 0–0.04)
IMM GRANULOCYTES NFR BLD AUTO: 0 % (ref 0–0.5)
LYMPHOCYTES # BLD: 1 K/UL (ref 0.8–3.5)
LYMPHOCYTES NFR BLD: 10 % (ref 12–49)
MCH RBC QN AUTO: 31.8 PG (ref 26–34)
MCHC RBC AUTO-ENTMCNC: 33.5 G/DL (ref 30–36.5)
MCV RBC AUTO: 94.8 FL (ref 80–99)
MONOCYTES # BLD: 0.6 K/UL (ref 0–1)
MONOCYTES NFR BLD: 6 % (ref 5–13)
NEUTS SEG # BLD: 8.3 K/UL (ref 1.8–8)
NEUTS SEG NFR BLD: 83 % (ref 32–75)
NRBC # BLD: 0 K/UL (ref 0–0.01)
NRBC BLD-RTO: 0 PER 100 WBC
PLATELET # BLD AUTO: 171 K/UL (ref 150–400)
PMV BLD AUTO: 9.9 FL (ref 8.9–12.9)
POTASSIUM SERPL-SCNC: 3.7 MMOL/L (ref 3.5–5.1)
PROT SERPL-MCNC: 5.9 G/DL (ref 6.4–8.2)
RBC # BLD AUTO: 3.62 M/UL (ref 4.1–5.7)
SODIUM SERPL-SCNC: 142 MMOL/L (ref 136–145)
WBC # BLD AUTO: 10.1 K/UL (ref 4.1–11.1)

## 2022-11-27 PROCEDURE — 97165 OT EVAL LOW COMPLEX 30 MIN: CPT

## 2022-11-27 PROCEDURE — 65270000046 HC RM TELEMETRY

## 2022-11-27 PROCEDURE — 74011250637 HC RX REV CODE- 250/637: Performed by: HOSPITALIST

## 2022-11-27 PROCEDURE — 74011000250 HC RX REV CODE- 250: Performed by: FAMILY MEDICINE

## 2022-11-27 PROCEDURE — 97535 SELF CARE MNGMENT TRAINING: CPT

## 2022-11-27 PROCEDURE — 74011000250 HC RX REV CODE- 250: Performed by: INTERNAL MEDICINE

## 2022-11-27 PROCEDURE — 74011636637 HC RX REV CODE- 636/637: Performed by: HOSPITALIST

## 2022-11-27 PROCEDURE — 80053 COMPREHEN METABOLIC PANEL: CPT

## 2022-11-27 PROCEDURE — 74011250636 HC RX REV CODE- 250/636: Performed by: FAMILY MEDICINE

## 2022-11-27 PROCEDURE — 74011000250 HC RX REV CODE- 250: Performed by: HOSPITALIST

## 2022-11-27 PROCEDURE — 77010033678 HC OXYGEN DAILY

## 2022-11-27 PROCEDURE — 36415 COLL VENOUS BLD VENIPUNCTURE: CPT

## 2022-11-27 PROCEDURE — 94762 N-INVAS EAR/PLS OXIMTRY CONT: CPT

## 2022-11-27 PROCEDURE — 94760 N-INVAS EAR/PLS OXIMETRY 1: CPT

## 2022-11-27 PROCEDURE — 94640 AIRWAY INHALATION TREATMENT: CPT

## 2022-11-27 PROCEDURE — 97530 THERAPEUTIC ACTIVITIES: CPT

## 2022-11-27 PROCEDURE — 85025 COMPLETE CBC W/AUTO DIFF WBC: CPT

## 2022-11-27 RX ORDER — IPRATROPIUM BROMIDE AND ALBUTEROL SULFATE 2.5; .5 MG/3ML; MG/3ML
3 SOLUTION RESPIRATORY (INHALATION)
Status: DISCONTINUED | OUTPATIENT
Start: 2022-11-27 | End: 2022-11-30 | Stop reason: HOSPADM

## 2022-11-27 RX ORDER — ARFORMOTEROL TARTRATE 15 UG/2ML
15 SOLUTION RESPIRATORY (INHALATION)
Status: DISCONTINUED | OUTPATIENT
Start: 2022-11-27 | End: 2022-11-30 | Stop reason: HOSPADM

## 2022-11-27 RX ORDER — BUDESONIDE 0.5 MG/2ML
500 INHALANT ORAL
Status: DISCONTINUED | OUTPATIENT
Start: 2022-11-27 | End: 2022-11-30 | Stop reason: HOSPADM

## 2022-11-27 RX ADMIN — LISINOPRIL 5 MG: 5 TABLET ORAL at 09:36

## 2022-11-27 RX ADMIN — IPRATROPIUM BROMIDE AND ALBUTEROL SULFATE 3 ML: .5; 3 SOLUTION RESPIRATORY (INHALATION) at 07:19

## 2022-11-27 RX ADMIN — HEPARIN SODIUM 5000 UNITS: 5000 INJECTION, SOLUTION INTRAVENOUS; SUBCUTANEOUS at 06:48

## 2022-11-27 RX ADMIN — SODIUM CHLORIDE, PRESERVATIVE FREE 10 ML: 5 INJECTION INTRAVENOUS at 13:30

## 2022-11-27 RX ADMIN — BUDESONIDE 500 MCG: 0.5 INHALANT RESPIRATORY (INHALATION) at 21:54

## 2022-11-27 RX ADMIN — SODIUM CHLORIDE, PRESERVATIVE FREE 1 G: 5 INJECTION INTRAVENOUS at 13:28

## 2022-11-27 RX ADMIN — ABIRATERONE ACETATE: 250 TABLET ORAL at 09:37

## 2022-11-27 RX ADMIN — HEPARIN SODIUM 5000 UNITS: 5000 INJECTION, SOLUTION INTRAVENOUS; SUBCUTANEOUS at 16:23

## 2022-11-27 RX ADMIN — CALCIUM CARBONATE (ANTACID) CHEW TAB 500 MG 200 MG: 500 CHEW TAB at 09:36

## 2022-11-27 RX ADMIN — SODIUM CHLORIDE, PRESERVATIVE FREE 10 ML: 5 INJECTION INTRAVENOUS at 22:02

## 2022-11-27 RX ADMIN — CALCIUM CARBONATE (ANTACID) CHEW TAB 500 MG 200 MG: 500 CHEW TAB at 16:23

## 2022-11-27 RX ADMIN — IPRATROPIUM BROMIDE AND ALBUTEROL SULFATE 3 ML: .5; 3 SOLUTION RESPIRATORY (INHALATION) at 01:12

## 2022-11-27 RX ADMIN — IPRATROPIUM BROMIDE AND ALBUTEROL SULFATE 3 ML: .5; 3 SOLUTION RESPIRATORY (INHALATION) at 21:54

## 2022-11-27 RX ADMIN — AZITHROMYCIN DIHYDRATE 500 MG: 500 INJECTION, POWDER, LYOPHILIZED, FOR SOLUTION INTRAVENOUS at 13:28

## 2022-11-27 RX ADMIN — SODIUM CHLORIDE, PRESERVATIVE FREE 10 ML: 5 INJECTION INTRAVENOUS at 05:46

## 2022-11-27 RX ADMIN — ARFORMOTEROL TARTRATE 15 MCG: 15 SOLUTION RESPIRATORY (INHALATION) at 21:54

## 2022-11-27 RX ADMIN — HEPARIN SODIUM 5000 UNITS: 5000 INJECTION, SOLUTION INTRAVENOUS; SUBCUTANEOUS at 22:01

## 2022-11-27 RX ADMIN — PREDNISONE 5 MG: 5 TABLET ORAL at 09:36

## 2022-11-27 NOTE — PROGRESS NOTES
Problem: Self Care Deficits Care Plan (Adult)  Goal: *Acute Goals and Plan of Care (Insert Text)  Description: FUNCTIONAL STATUS PRIOR TO ADMISSION: Patient was independent and active without use of DME.     HOME SUPPORT: The patient lived with wife and daughter but did not require assist.    Occupational Therapy Goals  Initiated 11/27/2022  1. Patient will perform standing grooming task with supervision/set-up within 7 day(s). 2.  Patient will perform bathing with minimal assistance/contact guard assist within 7 day(s). 3.  Patient will perform upper body dressing with supervision/set-up within 7 day(s). 4.  Patient will perform toilet transfers with supervision/set-up within 7 day(s). 5.  Patient will perform all aspects of toileting with minimal assistance/contact guard assist within 7 day(s). 6.  Patient will participate in upper extremity therapeutic exercise/activities with independence for 10 minutes within 7 day(s). 7.  Patient will utilize energy conservation techniques during functional activities with verbal cues within 7 day(s). Outcome: Not Met     OCCUPATIONAL THERAPY EVALUATION  Patient: Sana Israel (33 y.o. male)  Date: 11/27/2022  Primary Diagnosis: Respiratory failure with hypoxia ([facility] [address] [J96.91]       Precautions:   Fall, Bed Alarm, Contact    ASSESSMENT  Based on the objective data described below, the patient presents with decreased activity tolerance and weakness following admission for falls. Pt cleared for therapy by nursing and received supine in bed with daughter and wife in room agreeable to therapy. Pt on 3.5L O2 via NC, however, on RA for majority of session with O2 sats at 92%. O2 sats dropped to 88% at end of session when returning to supine. Pt donned NC with quick recovery. Pt with complaints of dizziness throughout session. BP monitored and stable with dizziness resolving quickly.    Pt demo'ing weakness with bed mobility (requiring assistance to move BLE and scoot to EOB) and dressing task (Min A to remove tight long sleeve shirt). Pt refusing to reach toward calf to adjust sock and family reporting pt asking family members to feed him. MMT BUE strength 5/5. Discussed importance of participating in self care to preserve function and strength. Completed sit>stand with RW and functional mobility to foot of bed and side steps to head of bed. Pt returned to supine at end of session. Pending progress recommend SNF vs HHOT with increased family assistance at discharge. Current Level of Function Impacting Discharge (ADLs/self-care): Min A UB ADLs, Max A LB ADLs,     Functional Outcome Measure: The patient scored Total: 35/100 on the Barthel Index outcome measure which is indicative of being very dependent in basic self-care. Other factors to consider for discharge: Independent at baseline      Patient will benefit from skilled therapy intervention to address the above noted impairments. PLAN :  Recommendations and Planned Interventions: self care training, functional mobility training, therapeutic exercise, balance training, therapeutic activities, endurance activities, patient education, home safety training, and family training/education    Frequency/Duration: Patient will be followed by occupational therapy 5 times a week to address goals. Recommendation for discharge: (in order for the patient to meet his/her long term goals)  Therapy up to 5 days/week in SNF setting VS HHOT with increased assistance     This discharge recommendation:  Has not yet been discussed the attending provider and/or case management    IF patient discharges home will need the following DME: walker: rolling, BSC        SUBJECTIVE:   Patient stated Why do I keep feeling dizzy?     OBJECTIVE DATA SUMMARY:   HISTORY:   Past Medical History:   Diagnosis Date    Bladder [facility] ([facility] [address]     CAD (coronary artery disease)     MI 2004    Cancer [facility])     prostate s/p xrt    Macular degeneration  [facility]/01[facility]     Past Surgical History:   Procedure Laterality Date    HX APPENDECTOMY  1955    HX CATARACT REMOVAL      bilateral    HX UROLOGICAL  6/24/15    RIGHT ROBOTIC PARTIAL NEPHRECTOMY  (LATEX ALLERGY), renal exploration, cystectomy x 3    GA CARDIAC SURG PROCEDURE UNLIST      2 Stents       Expanded or extensive additional review of patient history:     Home Situation  Home Environment: Private residence  # Steps to Enter: 3  Rails to Enter: Yes  Hand Rails : Bilateral  Wheelchair Ramp: No  One/Two Story Residence: Two story  # of Interior Steps: 13  Interior Rails: Right  Lift Chair Available: No  Living Alone: No  Support Systems: Other Family Member(s)  Patient Expects to be Discharged to[de-identified] Home  Current DME Used/Available at Home: None      EXAMINATION OF PERFORMANCE DEFICITS:  Cognitive/Behavioral Status:  Neurologic State: Alert  Orientation Level: Oriented X4  Cognition: Follows commands  Perception: Appears intact  Perseveration: No perseveration noted  Safety/Judgement: Awareness of environment    Skin: dry, intact     Edema: none noted     Hearing: Auditory  Auditory Impairment: None    Vision/Perceptual:      Corrective Lenses: Glasses    Range of Motion:    AROM: Generally decreased, functional       Strength:    Strength: Generally decreased, functional     Coordination:     Fine Motor Skills-Upper: Left Intact; Right Intact    Gross Motor Skills-Upper: Left Intact; Right Intact    Tone & Sensation:     Sensation: Intact       Balance:  Sitting: Intact  Sitting - Static: Good (unsupported)  Sitting - Dynamic: Good (unsupported)  Standing: Impaired  Standing - Static: Good;Constant support  Standing - Dynamic : Fair;Constant support    Functional Mobility and Transfers for ADLs:  Bed Mobility:  Supine to Sit: Minimum assistance;Assist x1  Sit to Supine: Minimum assistance;Assist x1    Transfers:  Sit to Stand: Minimum assistance;Assist x1  Stand to Sit: Contact guard assistance  Assistive Device : Walker, rolling;Gait Belt    ADL Assessment:  Feeding: Setup; Independent    Oral Facial Hygiene/Grooming: Setup; Independent    Bathing: Moderate assistance    Type of Bath: Chlorhexidine (CHG)    Upper Body Dressing: Minimum assistance    Lower Body Dressing: Maximum assistance    Toileting: Moderate assistance       ADL Intervention and task modifications:       Cognitive Retraining  Safety/Judgement: Awareness of environment    Functional Measure:    Barthel Index:  Bathin  Bladder: 0  Bowels: 5  Groomin  Dressin  Feeding: 10  Mobility: 0  Stairs: 0  Toilet Use: 5  Transfer (Bed to Chair and Back): 10  Total: 35/100      The Barthel ADL Index: Guidelines  1. The index should be used as a record of what a patient does, not as a record of what a patient could do. 2. The main aim is to establish degree of independence from any help, physical or verbal, however minor and for whatever reason. 3. The need for supervision renders the patient not independent. 4. A patient's performance should be established using the best available evidence. Asking the patient, friends/relatives and nurses are the usual sources, but direct observation and common sense are also important. However direct testing is not needed. 5. Usually the patient's performance over the preceding 24-48 hours is important, but occasionally longer periods will be relevant. 6. Middle categories imply that the patient supplies over 50 per cent of the effort. 7. Use of aids to be independent is allowed. Score Interpretation (from [address] [address])    Independent   60-79 Minimally independent   40-59 Partially dependent   20-39 Very dependent   <20 Totally dependent     -Harsha Andersen., Barthel, D.W. (1965). Functional evaluation: the Barthel Index. [address] [facility] ([address] (1997).  The Barthel activities of daily living index: self-reporting versus actual performance in the old (> or = 75 years). Journal of [address] [place] [address] 45, [facility], OSMANF, Jonathan Gomez, Yonis Jenkins. (1999). Measuring the change in disability after inpatient rehabilitation; comparison of the responsiveness of the Barthel Index and Functional [place] Measure. Journal of Neurology, Neurosurgery, and Psychiatry, 66(4), 448-055. SONNY Anthony, SHERRI Guzman, & Tarik Goncalves M.A. (2004) Assessment of post-stroke quality of life in cost-effectiveness studies: The usefulness of the Barthel Index and the EuroQoL-5D. Quality of Life Research, 15, 950-42     Occupational Therapy Evaluation Charge Determination   History Examination Decision-Making   LOW Complexity : Brief history review  LOW Complexity : 1-3 performance deficits relating to physical, cognitive , or psychosocial skils that result in activity limitations and / or participation restrictions  LOW Complexity : No comorbidities that affect functional and no verbal or physical assistance needed to complete eval tasks       Based on the above components, the patient evaluation is determined to be of the following complexity level: LOW   Pain Rating:  Low pain at buttocks and hips     Activity Tolerance:   Fair    After treatment patient left in no apparent distress:    Supine in bed, Call bell within reach, Caregiver / family present, and Side rails x 3    COMMUNICATION/EDUCATION:   The patients plan of care was discussed with: Registered nurse. Home safety education was provided and the patient/caregiver indicated understanding. This patients plan of care is appropriate for delegation to [facility] [place].     Thank you for this referral.  Michoacano Osborn OT  Time Calculation: 32 mins

## 2022-11-27 NOTE — PROGRESS NOTES
[address] [facility] Adult  Hospitalist Group                                                                                          Hospitalist Progress Note  Cecile Bassett MD  Answering service: [phone] OR [phone] from in house phone        Date of Service:  2022  NAME:  Neno Almonte  :  1940  MRN:  [id number]      Admission Summary:   Patient presents to the ER complaining of fall, history of prostate cancer, presents to the ER with fall, patient reports that he was walking down the stairs, tripped and fell backwards, hitting his head, did not lose consciousness, denies any pain or discomfort anywhere, had 2 episodes of nausea and vomiting, got concerned and came to the ER, patient was found to be hypoxic in the ER, and was requested to be admitted to the hospitalist service, denies any other complaints or problems          Interval history / Subjective:   Still hypoxia requiring O2. Assessment & Plan:      Acute vs chronic hypoxic resp failure- Chest CTA reviewed: no pna, no PE, pulmonologist consulted:  Pt has centrilobular emphysema on CT chest and active long term smoker- suspect COPD is contributing to physiology of hypoxia but pt not appearing to have an AECOPD. Another DDX is a PFO with right to left shunting. Added nebs tx. O2. Incentive spirometery, echo pending to eval EF and with bubble study. Added pulmocort neb         Continue to wean O2   GLF- likely due to hypoxia, PT eval.   LYNSEY: renal function improved after hydration. May restart his lisinnoiral in am if cr stable . Dc ivf . Rt hydroureter : suspect ASX small stone in tunnel, urologist consulted and following . Renal function improved 1.05 (1.53) and he is not symptomatic ~ no need for stent or NT at this time , urologist following            Prostate cancer hx: urologist following   CAD            Code status: full   Prophylaxis: sc heparin   Care Plan discussed with: daughter bedside updated.    Anticipated Disposition: TBD, PT pending. Likely will need Home O2 vs SNF      Hospital Problems  Date Reviewed: 6/7/2022            Codes Class Noted POA    Respiratory failure with hypoxia [facility]) ICD-10-CM: J96.91  ICD-9-CM: 518.81  11/24/2022 Unknown         Review of Systems:   A comprehensive review of systems was negative except for that written in the HPI. Vital Signs:    Last 24hrs VS reviewed since prior progress note. Most recent are:  Visit Vitals  BP (!) 143/71 (BP 1 Location: Left upper arm, BP Patient Position: At rest;Semi fowlers)   Pulse 88   Temp 98.3 °F (36.8 °C)   Resp 16   Ht 5' 9\" (1.753 m)   Wt 80.5 kg (177 lb 7.5 oz)   SpO2 94%   BMI 26.21 kg/m²         Intake/Output Summary (Last 24 hours) at 11/27/2022 1638  Last data filed at 11/27/2022 2729  Gross per 24 hour   Intake --   Output 750 ml   Net -750 ml          Physical Examination:     I had a face to face encounter with this patient and independently examined them on 11/27/2022 as outlined below:          Constitutional:  No acute distress, cooperative, pleasant , ON o2    ENT:  Oral mucosa moist, oropharynx benign. Resp:  Coarse bs, no wheezing. AE ok. CV:  Regular rhythm, normal rate, no murmurs, gallops, rubs    GI:  Soft, non distended, non tender. normoactive bowel sounds, no hepatosplenomegaly     Musculoskeletal:  No edema, warm, 2+ pulses throughout    Neurologic:  Moves all extremities.   AAOx3, CN II-XII reviewed            Data Review:    Review and/or order of clinical lab test      Labs:     Recent Labs     11/27/22 0514 11/26/22  0711   WBC 10.1 11.5*   HGB 11.5* 11.5*   HCT 34.3* 34.5*    149*       Recent Labs     11/27/22  0514 11/26/22  0711 11/25/22  0358    140 142   K 3.7 3.7 3.8   * 109* 112*   CO2 24 23 24   BUN 19 17 19   CREA 0.85 0.96 1.05   * 134* 122*   CA 9.4 9.3 9.1   MG  --  2.1  --    PHOS  --  2.6  --        Recent Labs     11/27/22  0514 11/26/22  0711 11/25/22  0358   ALT 23 25 29   AP 75 76 78   TBILI 0.4 0.5 0.6   TP 5.9* 6.4 6.5   ALB 2.9* 2.8* 3.1*   GLOB 3.0 3.6 3.4       No results for input(s): INR, PTP, APTT, INREXT, INREXT in the last 72 hours. No results for input(s): FE, TIBC, PSAT, FERR in the last 72 hours. No results found for: FOL, RBCF   No results for input(s): PH, PCO2, PO2 in the last 72 hours. No results for input(s): CPK, CKNDX, TROIQ in the last 72 hours.     No lab exists for component: CPKMB  Lab Results   Component Value Date/Time    Cholesterol, total 238 (H) 08/18/2014 10:07 AM    HDL Cholesterol 38 (L) 08/18/2014 10:07 AM    LDL, calculated 139 (H) 08/18/2014 10:07 AM    Triglyceride 307 (H) 08/18/2014 10:07 AM     Lab Results   Component Value Date/Time    Glucose (POC) 147 (H) 06/24/2015 06:39 PM    Glucose (POC) 101 06/24/2015 08:05 AM     Lab Results   Component Value Date/Time    Color YELLOW/STRAW 11/24/2022 06:02 PM    Appearance CLOUDY (A) 11/24/2022 06:02 PM    Specific gravity 1.025 11/24/2022 06:02 PM    Specific gravity 1.017 09/13/2018 08:23 PM    pH (UA) 5.5 11/24/2022 06:02 PM    Protein 100 (A) 11/24/2022 06:02 PM    Glucose Negative 11/24/2022 06:02 PM    Ketone Negative 11/24/2022 06:02 PM    Bilirubin Negative 11/24/2022 06:02 PM    Urobilinogen 0.2 11/24/2022 06:02 PM    Nitrites Positive (A) 11/24/2022 06:02 PM    Leukocyte Esterase MODERATE (A) 11/24/2022 06:02 PM    Epithelial cells FEW 11/24/2022 06:02 PM    Bacteria 3+ (A) 11/24/2022 06:02 PM    WBC 5-10 11/24/2022 06:02 PM    RBC 0-5 11/24/2022 06:02 PM         Medications Reviewed:     Current Facility-Administered Medications   Medication Dose Route Frequency    albuterol-ipratropium (DUO-NEB) 2.5 MG-0.5 MG/3 ML  3 mL Nebulization BID RT    arformoteroL (BROVANA) neb solution 15 mcg  15 mcg Nebulization BID RT    And    budesonide (PULMICORT) 500 mcg/2 ml nebulizer suspension  500 mcg Nebulization BID RT    lisinopriL (PRINIVIL, ZESTRIL) tablet 5 mg  5 mg Oral DAILY    calcium carbonate (TUMS) chewable tablet 200 mg [elemental]  200 mg Oral TID WITH MEALS    ondansetron (ZOFRAN) injection 4 mg  4 mg IntraVENous Q6H PRN    abiraterone tab   Oral DAILY WITH BREAKFAST    LORazepam (ATIVAN) tablet 0.5 mg  0.5 mg Oral DAILY PRN    predniSONE (DELTASONE) tablet 5 mg  5 mg Oral DAILY WITH BREAKFAST    sodium chloride (NS) flush 5-40 mL  5-40 mL IntraVENous Q8H    sodium chloride (NS) flush 5-40 mL  5-40 mL IntraVENous PRN    cefTRIAXone (ROCEPHIN) 1 g in 0.9% sodium chloride 10 mL IV syringe  1 g IntraVENous Q24H    azithromycin (ZITHROMAX) 500 mg in 0.9% sodium chloride 250 mL ([id number])  500 mg IntraVENous Q24H    acetaminophen (TYLENOL) tablet 650 mg  650 mg Oral Q4H PRN    heparin (porcine) injection 5,000 Units  5,000 Units SubCUTAneous Q8H    albuterol-ipratropium (DUO-NEB) 2.5 MG-0.5 MG/3 ML  3 mL Nebulization Q4H PRN     ______________________________________________________________________  EXPECTED LENGTH OF STAY: 3d 14h  ACTUAL LENGTH OF STAY:          3                 Mireille Bloom MD

## 2022-11-27 NOTE — PROGRESS NOTES
No sig change  PSA 13.2 also appears stable despite findings on CT  BCX NGTD  ASX, renal fxn stable 0.85  NOnintervention at this time  Will review [facility] Monday

## 2022-11-28 ENCOUNTER — APPOINTMENT (OUTPATIENT)
Dept: GENERAL RADIOLOGY | Age: 82
DRG: 189 | End: 2022-11-28
Attending: PHYSICIAN ASSISTANT
Payer: MEDICARE

## 2022-11-28 PROCEDURE — 74011250637 HC RX REV CODE- 250/637: Performed by: NURSE PRACTITIONER

## 2022-11-28 PROCEDURE — 74011250637 HC RX REV CODE- 250/637: Performed by: HOSPITALIST

## 2022-11-28 PROCEDURE — 74011636637 HC RX REV CODE- 636/637: Performed by: HOSPITALIST

## 2022-11-28 PROCEDURE — 94640 AIRWAY INHALATION TREATMENT: CPT

## 2022-11-28 PROCEDURE — 74011000250 HC RX REV CODE- 250: Performed by: HOSPITALIST

## 2022-11-28 PROCEDURE — 74011000250 HC RX REV CODE- 250: Performed by: INTERNAL MEDICINE

## 2022-11-28 PROCEDURE — 71045 X-RAY EXAM CHEST 1 VIEW: CPT

## 2022-11-28 PROCEDURE — 74011250637 HC RX REV CODE- 250/637: Performed by: FAMILY MEDICINE

## 2022-11-28 PROCEDURE — 74011000250 HC RX REV CODE- 250: Performed by: FAMILY MEDICINE

## 2022-11-28 PROCEDURE — 74011250636 HC RX REV CODE- 250/636: Performed by: FAMILY MEDICINE

## 2022-11-28 PROCEDURE — 65270000046 HC RM TELEMETRY

## 2022-11-28 RX ORDER — DOCUSATE SODIUM 100 MG/1
100 CAPSULE, LIQUID FILLED ORAL DAILY
Status: DISCONTINUED | OUTPATIENT
Start: 2022-11-28 | End: 2022-11-30 | Stop reason: HOSPADM

## 2022-11-28 RX ADMIN — ABIRATERONE ACETATE: 250 TABLET ORAL at 09:54

## 2022-11-28 RX ADMIN — LISINOPRIL 5 MG: 5 TABLET ORAL at 09:54

## 2022-11-28 RX ADMIN — ACETAMINOPHEN 650 MG: 325 TABLET ORAL at 09:54

## 2022-11-28 RX ADMIN — CALCIUM CARBONATE (ANTACID) CHEW TAB 500 MG 200 MG: 500 CHEW TAB at 16:04

## 2022-11-28 RX ADMIN — AZITHROMYCIN DIHYDRATE 500 MG: 500 INJECTION, POWDER, LYOPHILIZED, FOR SOLUTION INTRAVENOUS at 12:50

## 2022-11-28 RX ADMIN — HEPARIN SODIUM 5000 UNITS: 5000 INJECTION, SOLUTION INTRAVENOUS; SUBCUTANEOUS at 16:03

## 2022-11-28 RX ADMIN — ARFORMOTEROL TARTRATE 15 MCG: 15 SOLUTION RESPIRATORY (INHALATION) at 20:44

## 2022-11-28 RX ADMIN — ARFORMOTEROL TARTRATE 15 MCG: 15 SOLUTION RESPIRATORY (INHALATION) at 07:30

## 2022-11-28 RX ADMIN — IPRATROPIUM BROMIDE AND ALBUTEROL SULFATE 3 ML: .5; 3 SOLUTION RESPIRATORY (INHALATION) at 07:30

## 2022-11-28 RX ADMIN — HEPARIN SODIUM 5000 UNITS: 5000 INJECTION, SOLUTION INTRAVENOUS; SUBCUTANEOUS at 21:09

## 2022-11-28 RX ADMIN — IPRATROPIUM BROMIDE AND ALBUTEROL SULFATE 3 ML: .5; 3 SOLUTION RESPIRATORY (INHALATION) at 20:43

## 2022-11-28 RX ADMIN — ALUMINUM HYDROXIDE AND MAGNESIUM HYDROXIDE 15 ML: 200; 200 SUSPENSION ORAL at 00:05

## 2022-11-28 RX ADMIN — SODIUM CHLORIDE, PRESERVATIVE FREE 1 G: 5 INJECTION INTRAVENOUS at 12:42

## 2022-11-28 RX ADMIN — BUDESONIDE 500 MCG: 0.5 INHALANT RESPIRATORY (INHALATION) at 20:43

## 2022-11-28 RX ADMIN — ALUMINUM HYDROXIDE AND MAGNESIUM HYDROXIDE 15 ML: 200; 200 SUSPENSION ORAL at 09:54

## 2022-11-28 RX ADMIN — PREDNISONE 5 MG: 5 TABLET ORAL at 09:54

## 2022-11-28 RX ADMIN — DOCUSATE SODIUM 100 MG: 100 CAPSULE, LIQUID FILLED ORAL at 12:00

## 2022-11-28 RX ADMIN — SODIUM CHLORIDE, PRESERVATIVE FREE 10 ML: 5 INJECTION INTRAVENOUS at 06:00

## 2022-11-28 RX ADMIN — CALCIUM CARBONATE (ANTACID) CHEW TAB 500 MG 200 MG: 500 CHEW TAB at 12:41

## 2022-11-28 RX ADMIN — HEPARIN SODIUM 5000 UNITS: 5000 INJECTION, SOLUTION INTRAVENOUS; SUBCUTANEOUS at 08:49

## 2022-11-28 RX ADMIN — ACETAMINOPHEN 650 MG: 325 TABLET ORAL at 12:41

## 2022-11-28 RX ADMIN — BUDESONIDE 500 MCG: 0.5 INHALANT RESPIRATORY (INHALATION) at 07:30

## 2022-11-28 NOTE — PROGRESS NOTES
[address] [facility] Adult  Hospitalist Group                                                                                          Hospitalist Progress Note  pK Montiel MD  Answering service: [phone] OR [phone] from in house phone        Date of Service:  2022  NAME:  Jensen Almeida  :  1940  MRN:  [id number]      Admission Summary:   Patient presents to the ER complaining of fall, history of prostate cancer, presents to the ER with fall, patient reports that he was walking down the stairs, tripped and fell backwards, hitting his head, did not lose consciousness, denies any pain or discomfort anywhere, had 2 episodes of nausea and vomiting, got concerned and came to the ER, patient was found to be hypoxic in the ER, and was requested to be admitted to the hospitalist service, denies any other complaints or problems          Interval history / Subjective:     Patient is still requiring oxygen family at bedside all question answered they were unhappy about no communication done from the urologist today patient was seen by urology and pulmonology and discussed in details with daughter at bedside patient need to be evaluated by PT OT and thereafter possibly home with home health today or tomorrow       Assessment & Plan:      Acute vs chronic hypoxic resp failure-   --CTA no pneumonia no PE  --Echo with bubble study no shunt seen  --Continue nebs steroids  --Patient probably has COPD with history of smoking  --At this time does not look like COPD exacerbation  --Walk test for home oxygen  --Outpatient PFT as per pulmonary    GLF- likely due to hypoxia, PT eval.   LYNSEY: renal function improved after hydration. Restart ACE at discharge  Rt hydroureter : suspect ASX small stone in tunnel, urologist consulted and following .  Renal function improved 1.05 (1.53) and he is not symptomatic ~ no need for stent or NT at this time , urologist following            Prostate cancer hx: urologist following follow-up as an outpatient recommended and scheduled  CAD resume home medication is stable    Code status: full   Prophylaxis: sc heparin   Care Plan discussed with: daughter bedside updated. Anticipated Disposition: 24 hrs , PT pending. Likely will need Home O2       Hospital Problems  Date Reviewed: 6/7/2022            Codes Class Noted POA    Respiratory failure with hypoxia [facility]) ICD-10-CM: J96.91  ICD-9-CM: 518.81  11/24/2022 Unknown         Review of Systems:   A comprehensive review of systems was negative except for that written in the HPI. Vital Signs:    Last 24hrs VS reviewed since prior progress note. Most recent are:  Visit Vitals  /72   Pulse 78   Temp 97.2 °F (36.2 °C)   Resp 19   Ht 5' 9\" (1.753 m)   Wt 77.9 kg (171 lb 11.8 oz)   SpO2 96%   BMI 25.36 kg/m²         Intake/Output Summary (Last 24 hours) at 11/28/2022 1424  Last data filed at 11/28/2022 0400  Gross per 24 hour   Intake --   Output 475 ml   Net -475 ml          Physical Examination:     I had a face to face encounter with this patient and independently examined them on 11/28/2022 as outlined below:          Constitutional:  No acute distress, cooperative, pleasant , ON o2    ENT:  Oral mucosa moist, oropharynx benign. Resp:  Coarse bs, no wheezing. AE ok. CV:  Regular rhythm, normal rate, no murmurs, gallops, rubs    GI:  Soft, non distended, non tender. normoactive bowel sounds, no hepatosplenomegaly     Musculoskeletal:  No edema, warm, 2+ pulses throughout    Neurologic:  Moves all extremities.   AAOx3, CN II-XII reviewed            Data Review:    Review and/or order of clinical lab test      Labs:     Recent Labs     11/27/22  0514 11/26/22  0711   WBC 10.1 11.5*   HGB 11.5* 11.5*   HCT 34.3* 34.5*    149*       Recent Labs     11/27/22  0514 11/26/22  0711    140   K 3.7 3.7   * 109*   CO2 24 23   BUN 19 17   CREA 0.85 0.96   * 134*   CA 9.4 9.3   MG  --  2.1   PHOS  --  2.6 Recent Labs     11/27/22  0514 11/26/22  0711   ALT 23 25   AP 75 76   TBILI 0.4 0.5   TP 5.9* 6.4   ALB 2.9* 2.8*   GLOB 3.0 3.6       No results for input(s): INR, PTP, APTT, INREXT, INREXT in the last 72 hours. No results for input(s): FE, TIBC, PSAT, FERR in the last 72 hours. No results found for: FOL, RBCF   No results for input(s): PH, PCO2, PO2 in the last 72 hours. No results for input(s): CPK, CKNDX, TROIQ in the last 72 hours.     No lab exists for component: CPKMB  Lab Results   Component Value Date/Time    Cholesterol, total 238 (H) 08/18/2014 10:07 AM    HDL Cholesterol 38 (L) 08/18/2014 10:07 AM    LDL, calculated 139 (H) 08/18/2014 10:07 AM    Triglyceride 307 (H) 08/18/2014 10:07 AM     Lab Results   Component Value Date/Time    Glucose (POC) 147 (H) 06/24/2015 06:39 PM    Glucose (POC) 101 06/24/2015 08:05 AM     Lab Results   Component Value Date/Time    Color YELLOW/STRAW 11/24/2022 06:02 PM    Appearance CLOUDY (A) 11/24/2022 06:02 PM    Specific gravity 1.025 11/24/2022 06:02 PM    Specific gravity 1.017 09/13/2018 08:23 PM    pH (UA) 5.5 11/24/2022 06:02 PM    Protein 100 (A) 11/24/2022 06:02 PM    Glucose Negative 11/24/2022 06:02 PM    Ketone Negative 11/24/2022 06:02 PM    Bilirubin Negative 11/24/2022 06:02 PM    Urobilinogen 0.2 11/24/2022 06:02 PM    Nitrites Positive (A) 11/24/2022 06:02 PM    Leukocyte Esterase MODERATE (A) 11/24/2022 06:02 PM    Epithelial cells FEW 11/24/2022 06:02 PM    Bacteria 3+ (A) 11/24/2022 06:02 PM    WBC 5-10 11/24/2022 06:02 PM    RBC 0-5 11/24/2022 06:02 PM         Medications Reviewed:     Current Facility-Administered Medications   Medication Dose Route Frequency    docusate sodium (COLACE) capsule 100 mg  100 mg Oral DAILY    albuterol-ipratropium (DUO-NEB) 2.5 MG-0.5 MG/3 ML  3 mL Nebulization BID RT    arformoteroL (BROVANA) neb solution 15 mcg  15 mcg Nebulization BID RT    And    budesonide (PULMICORT) 500 mcg/2 ml nebulizer suspension 500 mcg Nebulization BID RT    aluminum-magnesium hydroxide (MAALOX) oral suspension 15 mL  15 mL Oral QID PRN    lisinopriL (PRINIVIL, ZESTRIL) tablet 5 mg  5 mg Oral DAILY    calcium carbonate (TUMS) chewable tablet 200 mg [elemental]  200 mg Oral TID WITH MEALS    ondansetron (ZOFRAN) injection 4 mg  4 mg IntraVENous Q6H PRN    abiraterone tab   Oral DAILY WITH BREAKFAST    LORazepam (ATIVAN) tablet 0.5 mg  0.5 mg Oral DAILY PRN    predniSONE (DELTASONE) tablet 5 mg  5 mg Oral DAILY WITH BREAKFAST    sodium chloride (NS) flush 5-40 mL  5-40 mL IntraVENous Q8H    sodium chloride (NS) flush 5-40 mL  5-40 mL IntraVENous PRN    acetaminophen (TYLENOL) tablet 650 mg  650 mg Oral Q4H PRN    heparin (porcine) injection 5,000 Units  5,000 Units SubCUTAneous Q8H    albuterol-ipratropium (DUO-NEB) 2.5 MG-0.5 MG/3 ML  3 mL Nebulization Q4H PRN     ______________________________________________________________________  EXPECTED LENGTH OF STAY: 3d 14h  ACTUAL LENGTH OF STAY:          4                 Nola Tuttle MD

## 2022-11-28 NOTE — PROGRESS NOTES
Transition Plan of Care  RUR 10%-Low  Disposition-likely home with home health and possible home oxygen. Order for home health noted. Per therapy he was independent prior to admit. Lives with wife, but did not require any assist. May be able to wean off oxygen prior to discharge home. Ambulating with OT, he maintained sats at 92% on room-air. Pendng progress with weaning, may not need home oxygen.

## 2022-11-28 NOTE — PROGRESS NOTES
Pulmonary, Critical Care, and Sleep Medicine    Progress Note    Name: Harrington Saint MRN: [id number]   : 1940 Hospital: [facility]    Date: 2022        IMPRESSION:   Acute vs chronic hypoxic resp failure- Pt has centrilobular emphysema on CT chest and active long term smoker-  Likely obstructive lung disease in this long time smoker. No formal PFTs to confirm at this time  GLF- likely due to hypoxia  Prostate cancer hx  CAD  Right hydroureter. Small Hiatal hernia       RECOMMENDATIONS:   Get ECHO with bubble study  Duonebs/brovana/pulmicort   D/c on symbicort 160 two puffs bid - will get PFTS as outpt  LYNSEY resolved  Chest x-ray today   Family at bedside     Subjective:         Feeling better today. Family at bedside     Patient is a 81 y.o. male h/p prostate cancer/CADans a> 36 pk year smoker admitted after GLF( negative CT head/neck and no LOC) Pt here found to be hypoxic on RA ( 81%) Placed on NC O2. Pt denies h/o lung diseases , on no O2 at home. No cough or CP.      ECHO  Result status: Final result       Left Ventricle: Normal left ventricular systolic function with a visually estimated EF of 55 - 60%. Left ventricle size is normal. Normal wall thickness. Normal wall motion. Study Details: Saline contrast was given to evaluate for intracardiac shunt. No shunt seen. Bubble study was negative. Past Medical History:   Diagnosis Date    Bladder cancer ([facility] [address]     CAD (coronary artery disease)     MI     Cancer [facility])     prostate s/p xrt    Macular degeneration     MI (myocardial infarction) ([facility] [address] 2004      Past Surgical History:   Procedure Laterality Date    HX APPENDECTOMY      HX CATARACT REMOVAL      bilateral    HX UROLOGICAL  6/24/15    RIGHT ROBOTIC PARTIAL NEPHRECTOMY  (LATEX ALLERGY), renal exploration, cystectomy x 3    RI CARDIAC SURG PROCEDURE UNLIST      2 Stents      Prior to Admission medications    Medication Sig Start Date End Date Taking? Authorizing Provider   lisinopriL (PRINIVIL, ZESTRIL) 5 mg tablet Take 1 Tablet by mouth daily. 10/24/22  Yes Paulette Marti NP   LORazepam (ATIVAN) 0.5 mg tablet 1 daily prn anxiety 6/7/22  Yes Abdon Kim DO   predniSONE (DELTASONE) 5 mg tablet TAKE 1 TABLET BY MOUTH ONCE DAILY WITH FOOD 7/2/21  Yes Provider, Historical   abiraterone 250 mg tab TAKE 1 TABLET BY MOUTH ONCE DAILY WITH A LOW FAT BREAKFAST 7/2/21  Yes Provider, Historical   vit A/vit C/vit E/zinc/copper (PRESERVISION AREDS PO) 2 Tabs. Yes Provider, Historical   Aspirin, Buffered 81 mg tab Take  by mouth. Yes Provider, Historical   saw palmetto 160 mg cap Take 1 Cap by mouth two (2) times a day. Yes Provider, Historical   multivitamin (ONE A DAY) tablet Take 1 Tab by mouth daily. Yes Provider, Historical   lisinopriL (PRINIVIL, ZESTRIL) 5 mg tablet Take 1 tablet by mouth once daily 10/24/22   Paulette Marti NP   bisacodyL (DULCOLAX) 5 mg EC tablet Take 5 mg by mouth daily.  Indications: CONSTIPATION  Patient not taking: Reported on 11/24/2022    Provider, Historical     Allergies   Allergen Reactions    Latex Other (comments)     Blistering of the skin      Social History     Tobacco Use    Smoking status: Every Day     Packs/day: 0.50     Years: 40.00     Pack years: 20.00     Types: Cigarettes    Smokeless tobacco: Never    Tobacco comments:     1 pack every 4 days   Substance Use Topics    Alcohol use: No     Alcohol/week: 0.0 standard drinks      Family History   Problem Relation Age of Onset    Alzheimer's Disease Mother     Cancer Father         colon    No Known Problems Sister     No Known Problems Sister     No Known Problems Sister         Current Facility-Administered Medications   Medication Dose Route Frequency    docusate sodium (COLACE) capsule 100 mg  100 mg Oral DAILY    albuterol-ipratropium (DUO-NEB) 2.5 MG-0.5 MG/3 ML  3 mL Nebulization BID RT    arformoteroL (BROVANA) neb solution 15 mcg  15 mcg Nebulization BID RT    And    budesonide (PULMICORT) 500 mcg/2 ml nebulizer suspension  500 mcg Nebulization BID RT    lisinopriL (PRINIVIL, ZESTRIL) tablet 5 mg  5 mg Oral DAILY    calcium carbonate (TUMS) chewable tablet 200 mg [elemental]  200 mg Oral TID WITH MEALS    abiraterone tab   Oral DAILY WITH BREAKFAST    predniSONE (DELTASONE) tablet 5 mg  5 mg Oral DAILY WITH BREAKFAST    sodium chloride (NS) flush 5-40 mL  5-40 mL IntraVENous Q8H    cefTRIAXone (ROCEPHIN) 1 g in 0.9% sodium chloride 10 mL IV syringe  1 g IntraVENous Q24H    azithromycin (ZITHROMAX) 500 mg in 0.9% sodium chloride 250 mL ([id number])  500 mg IntraVENous Q24H    heparin (porcine) injection 5,000 Units  5,000 Units SubCUTAneous Q8H       Review of Systems:  A comprehensive review of systems was negative except for that written in the HPI. Objective:   Vital Signs:    Visit Vitals  BP (!) 142/76   Pulse 88   Temp 97.2 °F (36.2 °C)   Resp 18   Ht 5' 9\" (1.753 m)   Wt 77.9 kg (171 lb 11.8 oz)   SpO2 93%   BMI 25.36 kg/m²       O2 Device: Nasal cannula   O2 Flow Rate (L/min): 3 l/min   Temp (24hrs), Av.7 °F (36.5 °C), Min:97.2 °F (36.2 °C), Max:98.1 °F (36.7 °C)       Intake/Output:   Last shift:      No intake/output data recorded. Last 3 shifts:  1901 -  0700  In: -   Out: 1225 [Urine:1225]    Intake/Output Summary (Last 24 hours) at 2022 1025  Last data filed at 2022 0400  Gross per 24 hour   Intake --   Output 475 ml   Net -475 ml        Physical Exam:   General:  Alert, cooperative, no distress, appears stated age. Head:  Normocephalic, without obvious abnormality, atraumatic. Eyes:  Conjunctivae/corneas clear. Nose: Nares normal. Septum midline. Neck: Supple, symmetrical, trachea midline,       Lungs:   Clear to auscultation bilaterally. Chest wall:  No tenderness or deformity. Heart:  Regular rate and rhythm, S1, S2 normal, no murmur, click, rub or gallop. Abdomen:   Soft, non-tender.  Bowel sounds normal. No masses,  No organomegaly. Extremities: Extremities normal, atraumatic, no cyanosis or edema. Data review:     No results found for this or any previous visit (from the past 24 hour(s)).       Imaging:  I have personally reviewed the patients radiographs and have reviewed the reports:  CTA chest no PE and + CL emphysema no fibrosis no lung masses no effusions no ILD changes        Luis Coughlin PA-C

## 2022-11-28 NOTE — PROGRESS NOTES
Physical Therapy:  11/28/2022    Entered room to find patient supine in bed and initially agreeable to PT. Began organizing room in preparation for mobilization OOB to chair. Once environment set up, attempted to again engage patient in rolling for supine > sit at EOB. Patient then stating he was not willing to mobilize OOB, citing lack of sleep last night. Educated patient on benefits of OOB mobility daily for skin integrity, cardiovascular health, and prevention of functional decline/ weakness,however patient continued to decline even sitting EOB and return to supine. Session aborted.      In: 4204  Out: 4171    Thank you,  Fern Godoy, PT, DPT

## 2022-11-28 NOTE — ADT AUTH CERT NOTES
Comments  Comment          Patient Demographics    Patient Name   Savannah Flores   [id number] Legal Sex   Male    1940 Address   [address] Phone   [phone] (Home)   [phone] (Mobile)     Patient Demographics    Patient Name   Savannah Flores   [id number] Legal Sex   Male    1940 Address   [address] [facility] [address] Phone   [phone] (Home)   [phone] (Mobile)     CSN:   [id number]     [address] [place] [address] Date: Admit Time Room Bed   2022  8:28  [[id number]] 01 [[id number]]     Attending Providers    Provider Pager From To   Carmine Smith MD  22   Berna Redman MD  22   Jerome Bush MD  22   Sara Quiroz MD  22      Patient Contacts    Name Relation Home Work Mobile   DEVI Son [phone]   Lee Daughter [phone]   [facility] [phone]  [phone]     Utilization Reviews       Respiratory Failure [facility] Day 5 (2022) by Delores Davis RN       Review Entered Review Status   2022 0948 Completed      Criteria Review      Care Day: 5 Care Date: 2022 Level of Care: Telemetry    Guideline Day 3    Level Of Care    ( ) * Activity level acceptable    2022 09:48:41 EST by Mariaa Bardales      ambulate w/ assist    Clinical Status    (X) * Ventilation status appropriate    2022 09:48:41 EST by Mariaa Bardales      no ventilator    (X) * Airway status acceptable    2022 09:48:41 EST by Mariaa Bardales      on 2L NC    ( ) * Respiratory status acceptable    2022 09:48:41 EST by Mariaa Bardales      on 2L NC  RR 41,15,44    ( ) * Stable chest findings    (X) * Neurologic status acceptable    2022 09:48:41 EST by Mariaa Bardales      alert and oriented x4    (X) * Temperature status acceptable    2022 09:48:41 EST by Mariaa sotelo 98.1  VS: T: 98.1, B/P: 147/74, HR 86, RR 22, SPO2 92 2L NC    ( ) * No infection, or status acceptable    11/28/2022 09:48:41 EST by Belkis Genao      on IV abx    ( ) * General Discharge Criteria met    Interventions    (X) * No chest tube, or status acceptable    11/28/2022 09:48:41 EST by Belkis Genao      not noted    (X) * Intake acceptable    11/28/2022 09:48:41 EST by Belkis Genao      ADULT DIET Regular; 4 carb choices (60 gm/meal); Low Fat/Low Chol/High Fiber/2 gm Na; Low Sodium (2 gm)    ( ) * No inpatient interventions needed    11/28/2022 09:48:41 EST by Belkis Genao      Abiraterone 1tab po daily  Duo neb 2x/day  Brovanna neb 2x/day  Pulmicort neb 2x/day  zithromax 500mg iv q24hrs  TUMS 200mg po 3x/day  rocephin 1gm iv q 24hr  heparin 5000units sc q 8hrs  lisinopril 5mg po daily  prednisone 5mg po daily  duo neb q6hrs x2    11/28/2022 09:48:41 EST by Belkis Genao    Subject: Additional Clinical Information    abnormal laBS: H/H 11.5/34.3,,GLUC 121,T PROT 5.9,ALB 2.9,AST 13       * Milestone   Additional Notes   11/27/2022   Hospitalist   Interval history / Subjective:   Still hypoxia requiring O2. Constitutional: No acute distress, cooperative, pleasant , ON o2    ENT: Oral mucosa moist, oropharynx benign. Resp: Coarse bs, no wheezing. AE ok. CV: Regular rhythm, normal rate, no murmurs, gallops, rubs    GI: Soft, non distended, non tender. normoactive bowel sounds, no hepatosplenomegaly     Musculoskeletal: No edema, warm, 2+ pulses throughout    Neurologic: Moves all extremities. AAOx3, CN II-XII reviewed                            Assessment & Plan:    Acute vs chronic hypoxic resp failure- Chest CTA reviewed: no pna, no PE, pulmonologist consulted:  Pt has centrilobular emphysema on CT chest and active long term smoker- suspect COPD is contributing to physiology of hypoxia but pt not appearing to have an AECOPD. Another DDX is a PFO with right to left shunting. Added nebs tx. O2. Incentive spirometery, echo pending to eval EF and with bubble study. Added pulmocort neb          Continue to wean O2    GLF- likely due to hypoxia, PT eval.    LYNSEY: renal function improved after hydration. May restart his lisinnoiral in am if cr stable . Dc ivf . Rt hydroureter : suspect ASX small stone in tunnel, urologist consulted and following . Renal function improved 1.05 (1.53) and he is not symptomatic ~ no need for stent or NT at this time , urologist following             Prostate cancer hx: urologist following    CAD        Code status: full    Prophylaxis: sc heparin    Care Plan discussed with: daughter bedside updated. Anticipated Disposition: TBD, PT pending. Likely will need Home O2 vs SNF       OT   ASSESSMENT   Based on the objective data described below, the patient presents with decreased activity tolerance and weakness following admission for falls. Pt cleared for therapy by nursing and received supine in bed with daughter and wife in room agreeable to therapy. Pt on 3.5L O2 via NC, however, on RA for majority of session with O2 sats at 92%. O2 sats dropped to 88% at end of session when returning to supine. Pt donned NC with quick recovery. Pt with complaints of dizziness throughout session. BP monitored and stable with dizziness resolving quickly. Pt demo'ing weakness with bed mobility (requiring assistance to move BLE and scoot to EOB) and dressing task (Min A to remove tight long sleeve shirt). Pt refusing to reach toward calf to adjust sock and family reporting pt asking family members to feed him. MMT BUE strength 5/5. Discussed importance of participating in self care to preserve function and strength. Completed sit>stand with RW and functional mobility to foot of bed and side steps to head of bed. Pt returned to supine at end of session. Pending progress recommend SNF vs HHOT with increased family assistance at discharge.         Current Level of Function Impacting Discharge (ADLs/self-care): Min A UB ADLs, Max A LB ADLs,        Functional Outcome Measure: The patient scored Total: 35/100 on the Barthel Index outcome measure which is indicative of being very dependent in basic self-care. Other factors to consider for discharge: Independent at baseline        Patient will benefit from skilled therapy intervention to address the above noted impairments. PLAN :   Recommendations and Planned Interventions: self care training, functional mobility training, therapeutic exercise, balance training, therapeutic activities, endurance activities, patient education, home safety training, and family training/education       Frequency/Duration: Patient will be followed by occupational therapy 5 times a week to address goals.        Recommendation for discharge: (in order for the patient to meet his/her long term goals)   Therapy up to 5 days/week in SNF setting VS HHOT with increased assistance        This discharge recommendation:   Has not yet been discussed the attending provider and/or case management       IF patient discharges home will need the following DME: walker: rolling, BSC       UROLOGY   No sig change   PSA 13.2 also appears stable despite findings on CT   BCX NGTD   ASX, renal fxn stable 0.85   NOnintervention at this time   Will review [facility] Monday           Respiratory Failure G - Care Day 4 (11/26/2022) by Angela Morse RN       Review Entered Review Status   11/28/2022 0937 Completed      Criteria Review      Care Day: 4 Care Date: 11/26/2022 Level of Care: Telemetry    Guideline Day 2    Level Of Care    ( ) ICU or ventilator-capable area    11/28/2022 09:37:21 EST by Layo Sun      telemetry    Clinical Status    (X) * Weaning assessment performed    11/28/2022 09:37:21 EST by Layo Sun      weaned to 4L NC    Interventions    (X) Inpatient interventions continue    11/28/2022 09:37:21 EST by Layo Sun      zithromax 500mg iv q24hrs  TUMS 200mg po 3x/day  rocephin 1gm iv q 24hr  heparin 5000units sc q 8hrs  lisinopril 5mg po daily  Ativan 0.5mg po  prednisone 5mg po daily  NS @ 75ml/hr  duo neb q6hrs    (X) Weaning trials, as indicated    11/28/2022 09:37:21 EST by Layo Sun      weaned to 4L NC    11/28/2022 09:37:21 EST by Layo Sun    Subject: Additional Clinical Information    Other orders: ambulate w/ assist, ADULT DIET Regular; 4 carb choices (60 gm/meal); Low Fat/Low Chol/High Fiber/2 gm Na; Low Sodium (2 gm)        Abnormal labs: wbc 11.5,h/h 11.5/34.5,plt 149,cl 109,gluc 134,alb 2.8,AST 14,NT pro BNP 3109,prostate specific Ag 13.2              MRI Brain: No significant abnormality or acute process. ECHO        Left Ventricle: Normal left ventricular systolic function with a visually estimated EF of 55 - 60%. Left ventricle size is normal. Normal wall thickness. Normal wall motion. Study Details: Saline contrast was given to evaluate for intracardiac shunt. No shunt seen. Bubble study was negative. * Milestone   Additional Notes   11/26/2022   VS: T:97.6, B/P: 146/64, HR 85, RR 18, SPO2 94 4L NC      PT Note:       New orders received and acknowledged. Chart reviewed. Patient was evaluated yesterday 11/25/22, overall min A for mobility. See evaluation for more details. Discharge recommendation per evaluation as follows:  SNF for rehab, if he discharges to home then recommend physical therapy at least 2 days/week in the home AND ensure assist and/or supervision for safety with all of mobility. Will follow up next week.  97.6 °F (36.4 °O)10866/67 Abnormal 91--1896 %97.6 °F (36.4 °E)91865/39 Abnormal 91--1896 %      HOSPITALIST   Interval history / Subjective:   Still hypoxia requiring O2. Mild lower back pain.  Denied hematuria    Acid reflux                                                   Constitutional: No acute distress, cooperative, pleasant , ON o2    ENT: Oral mucosa moist, oropharynx benign. Resp: Coarse bs, no wheezing. AE ok. CV: Regular rhythm, normal rate, no murmurs, gallops, rubs    GI: Soft, non distended, non tender. normoactive bowel sounds, no hepatosplenomegaly     Musculoskeletal: No edema, warm, 2+ pulses throughout    Neurologic: Moves all extremities. AAOx3, CN II-XII reviewed      Assessment & Plan:    Acute vs chronic hypoxic resp failure- Chest CTA reviewed: no pna, no PE, pulmonologist consulted:  Pt has centrilobular emphysema on CT chest and active long term smoker- suspect COPD is contributing to physiology of hypoxia but pt not appearing to have an AECOPD. Another DDX is a PFO with right to left shunting. Added nebs tx. O2. Incentive spirometery, echo pending to eval EF and with bubble study. Added pulmocort neb    GLF- likely due to hypoxia, PT eval.    LYNSEY: renal function improved after hydration. May restart his lisinnoiral in am if cr stable . Dc ivf . Rt hydroureter : suspect ASX small stone in tunnel, urologist consulted and following . Renal function improved 1.05 (1.53) and he is not symptomatic ~ no need for stent or NT at this time , urologist following             Prostate cancer hx: urologist following    CAD        Code status: full    Prophylaxis: sc heparin    Care Plan discussed with: daughter bedside updated. Anticipated Disposition: TBD, PT pending.        Adv Prostate cancer   Rt hydro with Scr 0.96, ASX -- related to progression of CAp at [place] based on BX K darryn 1 months ago   Will check PSA T and have Dr Christo Mejía review Monday   Reassurance provided   Pt and family want to hold off on Rt NT or IR eval                 Respiratory Failure GRG - Care Day 3 (11/25/2022) by Ahmet Fernandez RN       Review Entered Review Status   11/28/2022 0927 Completed      Criteria Review      Care Day: 3 Care Date: 11/25/2022 Level of Care: Telemetry    Guideline Day 2    Level Of Care    ( ) ICU or ventilator-capable area 11/28/2022 09:27:33 EST by Mariaa Bardales      telemetry    Clinical Status    ( ) * Weaning assessment performed    Interventions    (X) Inpatient interventions continue    11/28/2022 09:27:33 EST by Mariaa Bardales      zithromax 500mg iv q24hrs,rocephin 1gm iv q 24hrs,heparin 5000units sc q 8hrs,zofran 4mg iv q 6hrs,prednisone 5mg po daily,NS @ 75ml/hr,duo neb q6hrs,norvac 10mg po daily    ( ) Weaning trials, as indicated    11/28/2022 09:27:33 EST by Mariaa Bardales      on 5l NC    11/28/2022 09:27:33 EST by Mariaa Bardales    Subject: Additional Clinical Information    Other orders: ambulate w/ assist, ADULT DIET Regular; 4 carb choices (60 gm/meal); Low Fat/Low Chol/High Fiber/2 gm Na; Low Sodium (2 gm)               abnormal labs: h/h 11.6/35.2,cl 112,gluc 122, alb 3.1       * Milestone   Additional Notes   11/25/2022   VS: T: 98.5, B/P: 150/83, HR 78, RR 18, SPO2 95 5L NC      HOSPITALIST   Interval history / Subjective:   Still hypoxia requiring O2. Mild lower back pain. Denied hematuria        Constitutional:    No acute distress, cooperative, pleasant , ON o2    ENT:    Oral mucosa moist, oropharynx benign. Resp:    Coarse bs, no wheezing. AE ok. CV:    Regular rhythm, normal rate, no murmurs, gallops, rubs    GI:    Soft, non distended, non tender. normoactive bowel sounds, no hepatosplenomegaly     Musculoskeletal:    No edema, warm, 2+ pulses throughout    Neurologic:    Moves all extremities. AAOx3, CN II-XII reviewed      Assessment & Plan:    Acute vs chronic hypoxic resp failure- Chest CTA reviewed: no pna, no PE, pulmonologist consulted:  Pt has centrilobular emphysema on CT chest and active long term smoker- suspect COPD is contributing to physiology of hypoxia but pt not appearing to have an AECOPD. Another DDX is a PFO with right to left shunting. Added nebs tx. O2. Incentive spirometery, echo pending to eval EF and with bubble study.     GLF- likely due to hypoxia, PT eval. LYNSEY: renal function improved after hydration. May restart his lisinnoiral in am if cr stable    Rt hydroureter : suspect ASX small stone in tunnel, urologist consulted. Renal function improved 1.05 (1.53) and he is not symptomatic ~ no need for stent or NT at this time , urologist following             Prostate cancer hx   CAD        Code status: full    Prophylaxis: sc heparin    Care Plan discussed with: daughter, son and wife    Anticipated Disposition: TBD, PT pending      PT   ASSESSMENT   Based on the objective data described below, the patient presents with impaired sitting and standing balance and need for assist with all aspects of mobility, not at his baseline. He was admitted with a diagnosis of respiratory failure and is s/p a fall on stairs just prior to admission (fell backwards and hit his head). Initially he declined this eval but with encouragement he was agreeable. He required increased time for all of task completion but was able to step over to the chair and remain up to the chair post session. Of note, he was on 5 liters of 02 during eval (baseline is room air) and Sp02 was stable, see chart below. Per pulmonary: \"pt has centrilobular emphysema on CT chest and active long term smoker\". I recommend a trial use of a rolling walker. Anticipate slow steady gains and likely need for short term rehab. I highly recommend that he is up to the chair for meals. I also highly recommend an OT consult. Vitals:     11/25/22 1332 11/25/22 1347 11/25/22 1355    BP: (!) 143/72 (!) 146/70 (!) 141/72    BP 1 Location: Left upper arm Left upper arm Left upper arm    BP Patient Position: Semi fowlers Sitting   Comment: EOB Sitting   Comment: up to chair    Pulse: 82 86 89    Temp:    Resp:    Height:    Weight:    SpO2: on 5 liters of 02 96% 94% 95%         Current Level of Function Impacting Discharge (mobility/balance): min assist with impaired sitting and standing balance.        Functional Outcome Measure: The patient scored 0 on the TUG (unable to complete) outcome measure which is indicative of increased fall risk. .         Other factors to consider for discharge: Two story home, room air is baseline, and per urology note \"prostate cancer mets to bone/bladder\"       Patient will benefit from skilled therapy intervention to address the above noted impairments. PLAN :   Recommendations and Planned Interventions: bed mobility training, transfer training, gait training, therapeutic exercises, patient and family training/education, and therapeutic activities         Frequency/Duration: Patient will be followed by physical therapy:  5 times a week to address goals. Recommendation for discharge: (in order for the patient to meet his/her long term goals)   SNF for rehab, if he discharges to home then recommend physical therapy at least 2 days/week in the home AND ensure assist and/or supervision for safety with all of mobility. This discharge recommendation:   A follow-up discussion with the attending provider and/or case management is planned       IF patient discharges home will need the following DME: to be determined (TBD), possibly a rolling walker       PULMONOLOGY   Subjective:   Patient is a 81 y.o. male h/p prostate cancer/CADans a> 40 pk year smoker admitted after GLF( negative CT head/neck and no LOC) Pt here found to be hypoxic on RA ( 81%) Placed on NC O2. Pt denies h/o lung diseases , on no O2 at home. No cough or CP. IMPRESSION:   Acute vs chronic hypoxic resp failure- Pt has centrilobular emphysema on CT chest and active long term smoker- I suspect COPD is contributing to physiology of hypoxia but pt not appearing to have an AECOPD. Another DDX is a PFO with right to left shunting   GLF- likely due to hypoxia   Prostate cancer hx   CAD   Right hydroureter.        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 No SSX of infection sepsis.  Uncertain how hypoxia related - richter neg so far        Plan:   Renal function improved 1.05 (1.53) and he is not symptomatic ~ no need for stent or NT at this time

## 2022-11-28 NOTE — PROGRESS NOTES
Bedside shift change report given to Edmundo Funk (oncoming nurse) by Nichole ARMAS (offgoing nurse). Report included the following information SBAR and Cardiac Rhythm nsr .

## 2022-11-28 NOTE — PROGRESS NOTES
Patient: Kp Altamirano MRN: [id number]  SSN: [id number]    YOB: 1940  Age: 81 y.o. Sex: male        ADMITTED: 2022 to Carlie Herr MD by Regina Sánchez MD for Respiratory failure with hypoxia [facility]) [J96.91]    Kp Altamirano is doing well. Feeling better and eager to go home. No flank pain or abd pain. Renal function wnl.   1150 documented urine output yesterday. Vitals: Temp (24hrs), Av.7 °F (36.5 °C), Min:97.2 °F (36.2 °C), Max:98.1 °F (36.7 °C)    Blood pressure (!) 142/76, pulse 79, temperature 97.2 °F (36.2 °C), resp. rate 18, height 5' 9\" (1.753 m), weight 77.9 kg (171 lb 11.8 oz), SpO2 93 %. Intake and Output:   1901 -  0700  In: -   Out: 7364 [Urine:1225]  No intake/output data recorded. SYLVESTER Output lats 24 hrs: No data found. SYLVESTER Output last 8 hrs: No data found. BM over last 24 hrs: No data found. Physical Exam  General: NAD, pleasant  Respiratory: no distress, breathing easily, NC  Abdomen: soft, no distention; non-tender to palpation  : no CVA tenderness, voiding independently with male external catheter, clear yellow UA  Neuro: Appropriate, no focal neurological deficits  Skin: warm, dry  Extremities: moves all, full ROM    Labs:  CBC:   Lab Results   Component Value Date/Time    WBC 10.1 2022 05:14 AM    HCT 34.3 (L) 2022 05:14 AM    PLATELET 676  05:14 AM     BMP:   Lab Results   Component Value Date/Time    Glucose 121 (H) 2022 05:14 AM    Sodium 142 2022 05:14 AM    Potassium 3.7 2022 05:14 AM    Chloride 109 (H) 2022 05:14 AM    CO2 24 2022 05:14 AM    BUN 19 2022 05:14 AM    Creatinine 0.85 2022 05:14 AM    Calcium 9.4 2022 05:14 AM      CT abd  shows delayed right renal nephrogram, right hydronephrosis and right hydroureter to the level of the right UVJ where there is a 5 mm calculus and a right UVJ urinary bladder mass.  In addition, there are two additional irregular urinary bladder masses; the largest urinary bladder mass is located at the central urinary bladder and measures 2.5 cm and 1.3 cm. 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 No SSX of infection sepsis. Uncertain how hypoxia related      - Renal function improved and now wnl, he is asymptomatic ~ no need for intervention at this time. F/U with his primary Urologist arranged after discharge. Please call with questions.       Supervising MD, Dr. Luis Glass  Signed By: Chel Vu NP - November 28, 2022

## 2022-11-29 PROCEDURE — 74011000250 HC RX REV CODE- 250: Performed by: HOSPITALIST

## 2022-11-29 PROCEDURE — 74011636637 HC RX REV CODE- 636/637: Performed by: HOSPITALIST

## 2022-11-29 PROCEDURE — 74011000250 HC RX REV CODE- 250: Performed by: FAMILY MEDICINE

## 2022-11-29 PROCEDURE — 74011250637 HC RX REV CODE- 250/637: Performed by: FAMILY MEDICINE

## 2022-11-29 PROCEDURE — 74011250636 HC RX REV CODE- 250/636: Performed by: FAMILY MEDICINE

## 2022-11-29 PROCEDURE — 74011000250 HC RX REV CODE- 250: Performed by: INTERNAL MEDICINE

## 2022-11-29 PROCEDURE — 94640 AIRWAY INHALATION TREATMENT: CPT

## 2022-11-29 PROCEDURE — 65270000046 HC RM TELEMETRY

## 2022-11-29 PROCEDURE — 77010033678 HC OXYGEN DAILY

## 2022-11-29 PROCEDURE — 74011250637 HC RX REV CODE- 250/637: Performed by: HOSPITALIST

## 2022-11-29 RX ORDER — BUDESONIDE AND FORMOTEROL FUMARATE DIHYDRATE 160; 4.5 UG/1; UG/1
2 AEROSOL RESPIRATORY (INHALATION) 2 TIMES DAILY
Qty: 1 EACH | Refills: 1 | Status: SHIPPED | OUTPATIENT
Start: 2022-11-29 | End: 2022-12-29

## 2022-11-29 RX ADMIN — IPRATROPIUM BROMIDE AND ALBUTEROL SULFATE 3 ML: .5; 3 SOLUTION RESPIRATORY (INHALATION) at 07:09

## 2022-11-29 RX ADMIN — LISINOPRIL 5 MG: 5 TABLET ORAL at 08:23

## 2022-11-29 RX ADMIN — ABIRATERONE ACETATE 250 MG: 250 TABLET ORAL at 10:16

## 2022-11-29 RX ADMIN — IPRATROPIUM BROMIDE AND ALBUTEROL SULFATE 3 ML: .5; 3 SOLUTION RESPIRATORY (INHALATION) at 22:17

## 2022-11-29 RX ADMIN — ARFORMOTEROL TARTRATE 15 MCG: 15 SOLUTION RESPIRATORY (INHALATION) at 22:18

## 2022-11-29 RX ADMIN — BUDESONIDE 500 MCG: 0.5 INHALANT RESPIRATORY (INHALATION) at 07:09

## 2022-11-29 RX ADMIN — SODIUM CHLORIDE, PRESERVATIVE FREE 10 ML: 5 INJECTION INTRAVENOUS at 21:18

## 2022-11-29 RX ADMIN — ARFORMOTEROL TARTRATE 15 MCG: 15 SOLUTION RESPIRATORY (INHALATION) at 07:09

## 2022-11-29 RX ADMIN — SODIUM CHLORIDE, PRESERVATIVE FREE 10 ML: 5 INJECTION INTRAVENOUS at 16:47

## 2022-11-29 RX ADMIN — PREDNISONE 5 MG: 5 TABLET ORAL at 08:23

## 2022-11-29 RX ADMIN — CALCIUM CARBONATE (ANTACID) CHEW TAB 500 MG 200 MG: 500 CHEW TAB at 12:33

## 2022-11-29 RX ADMIN — ACETAMINOPHEN 650 MG: 325 TABLET ORAL at 08:24

## 2022-11-29 RX ADMIN — CALCIUM CARBONATE (ANTACID) CHEW TAB 500 MG 200 MG: 500 CHEW TAB at 16:45

## 2022-11-29 RX ADMIN — CALCIUM CARBONATE (ANTACID) CHEW TAB 500 MG 200 MG: 500 CHEW TAB at 08:23

## 2022-11-29 RX ADMIN — BUDESONIDE 500 MCG: 0.5 INHALANT RESPIRATORY (INHALATION) at 22:18

## 2022-11-29 RX ADMIN — SODIUM CHLORIDE, PRESERVATIVE FREE 10 ML: 5 INJECTION INTRAVENOUS at 06:18

## 2022-11-29 RX ADMIN — HEPARIN SODIUM 5000 UNITS: 5000 INJECTION, SOLUTION INTRAVENOUS; SUBCUTANEOUS at 16:45

## 2022-11-29 RX ADMIN — HEPARIN SODIUM 5000 UNITS: 5000 INJECTION, SOLUTION INTRAVENOUS; SUBCUTANEOUS at 06:18

## 2022-11-29 NOTE — DISCHARGE INSTRUCTIONS
Discharge Instructions       PATIENT ID: Sabrina Wagner  MRN: [id number]   YOB: 1940    DATE OF ADMISSION: [unfilled]    DATE OF DISCHARGE: 11/29/2022    PRIMARY CARE PROVIDER: @PCP@     ATTENDING PHYSICIAN: [unfilled]  DISCHARGING PROVIDER: Edmond García MD    To contact this individual call [phone] and ask the  to page. If unavailable ask to be transferred the Adult Hospitalist Department. DISCHARGE DIAGNOSES  RESP FAILURE COPD AND FALL    CONSULTATIONS: [unfilled]    PROCEDURES/SURGERIES: * No surgery found *    PENDING TEST RESULTS:   At the time of discharge the following test results are still pending:     FOLLOW UP APPOINTMENTS:   [unfilled]     ADDITIONAL CARE RECOMMENDATIONS:     DIET: Cardiac Diet    ACTIVITY: Activity as tolerated    WOUND CARE:     EQUIPMENT needed:       Radiology      DISCHARGE MEDICATIONS:   See Medication Reconciliation Form    It is important that you take the medication exactly as they are prescribed. Keep your medication in the bottles provided by the pharmacist and keep a list of the medication names, dosages, and times to be taken in your wallet. Do not take other medications without consulting your doctor. NOTIFY YOUR PHYSICIAN FOR ANY OF THE FOLLOWING:   Fever over 101 degrees for 24 hours. Chest pain, shortness of breath, fever, chills, nausea, vomiting, diarrhea, change in mentation, falling, weakness, bleeding. Severe pain or pain not relieved by medications. Or, any other signs or symptoms that you may have questions about. DISPOSITION:   X Home With:  X OT X PT X HH  RN       SNF/Inpatient Rehab/LTAC    Independent/assisted living    Hospice    Other:     CDMP Checked: Yes X     PROBLEM LIST Updated:   Yes X       Signed:   Edmond García MD  11/29/2022  9:00 AM

## 2022-11-29 NOTE — PROGRESS NOTES
Pulmonary, Critical Care, and Sleep Medicine    Progress Note    Name: Harrington Saint MRN: [id number]   : 1940 Hospital: [facility]   Date: 2022        IMPRESSION:   Acute vs chronic hypoxic resp failure- Pt has centrilobular emphysema on CT chest and active long term smoker-  Likely obstructive lung disease in this long time smoker. No formal PFTs to confirm at this time  GLF- likely due to hypoxia  Prostate cancer hx  CAD  Right hydroureter. Small Hiatal hernia       RECOMMENDATIONS:   Duonebs/brovana/pulmicort   D/c on symbicort 160 two puffs bid - will get PFTS as outpt with Dr Mindi MENON resolved  Family at bedside  Spoke to RN   We will be available again to see if needed      Subjective:       Chest x-ray showed a trace right pleural effusion. Otherwise clear lungs  Breathing well today   Questions asked of me were answered to the best of my ability and their apparent satisfaction       Feeling better today. Family at bedside     Patient is a 81 y.o. male h/p prostate cancer/CADans a> 36 pk year smoker admitted after GLF( negative CT head/neck and no LOC) Pt here found to be hypoxic on RA ( 81%) Placed on NC O2. Pt denies h/o lung diseases , on no O2 at home. No cough or CP.      ECHO  Result status: Final result       Left Ventricle: Normal left ventricular systolic function with a visually estimated EF of 55 - 60%. Left ventricle size is normal. Normal wall thickness. Normal wall motion. Study Details: Saline contrast was given to evaluate for intracardiac shunt. No shunt seen. Bubble study was negative.       Past Medical History:   Diagnosis Date    Bladder cancer ([facility]     CAD (coronary artery disease)     MI     Cancer [facility])     prostate s/p xrt    Macular degeneration     MI (myocardial infarction) ([facility] 2004      Past Surgical History:   Procedure Laterality Date    HX APPENDECTOMY      HX CATARACT REMOVAL      bilateral    HX UROLOGICAL 6/24/15    RIGHT ROBOTIC PARTIAL NEPHRECTOMY  (LATEX ALLERGY), renal exploration, cystectomy x 3    SD CARDIAC SURG PROCEDURE UNLIST      2 Stents      Prior to Admission medications    Medication Sig Start Date End Date Taking? Authorizing Provider   budesonide-formoteroL (Symbicort) 160-4.5 mcg/actuation HFAA Take 2 Puffs by inhalation two (2) times a day for 30 days. 11/29/22 12/29/22 Yes Karel Bobby MD   lisinopriL (PRINIVIL, ZESTRIL) 5 mg tablet Take 1 Tablet by mouth daily. 10/24/22  Yes Lonnie Jiménez NP   LORazepam (ATIVAN) 0.5 mg tablet 1 daily prn anxiety 6/7/22  Yes Abdon Kim DO   predniSONE (DELTASONE) 5 mg tablet TAKE 1 TABLET BY MOUTH ONCE DAILY WITH FOOD 7/2/21  Yes Provider, Historical   abiraterone 250 mg tab TAKE 1 TABLET BY MOUTH ONCE DAILY WITH A LOW FAT BREAKFAST 7/2/21  Yes Provider, Historical   vit A/vit C/vit E/zinc/copper (PRESERVISION AREDS PO) 2 Tabs. Yes Provider, Historical   Aspirin, Buffered 81 mg tab Take  by mouth. Yes Provider, Historical   saw palmetto 160 mg cap Take 1 Cap by mouth two (2) times a day. Yes Provider, Historical   multivitamin (ONE A DAY) tablet Take 1 Tab by mouth daily. Yes Provider, Historical   lisinopriL (PRINIVIL, ZESTRIL) 5 mg tablet Take 1 tablet by mouth once daily 10/24/22   Lonnie Jiménez NP   bisacodyL (DULCOLAX) 5 mg EC tablet Take 5 mg by mouth daily.  Indications: CONSTIPATION    Provider, Historical     Allergies   Allergen Reactions    Latex Other (comments)     Blistering of the skin      Social History     Tobacco Use    Smoking status: Every Day     Packs/day: 0.50     Years: 40.00     Pack years: 20.00     Types: Cigarettes    Smokeless tobacco: Never    Tobacco comments:     1 pack every 4 days   Substance Use Topics    Alcohol use: No     Alcohol/week: 0.0 standard drinks      Family History   Problem Relation Age of Onset    Alzheimer's Disease Mother     Cancer Father         colon    No Known Problems Sister No Known Problems Sister     No Known Problems Sister         Current Facility-Administered Medications   Medication Dose Route Frequency    docusate sodium (COLACE) capsule 100 mg  100 mg Oral DAILY    albuterol-ipratropium (DUO-NEB) 2.5 MG-0.5 MG/3 ML  3 mL Nebulization BID RT    arformoteroL (BROVANA) neb solution 15 mcg  15 mcg Nebulization BID RT    And    budesonide (PULMICORT) 500 mcg/2 ml nebulizer suspension  500 mcg Nebulization BID RT    lisinopriL (PRINIVIL, ZESTRIL) tablet 5 mg  5 mg Oral DAILY    calcium carbonate (TUMS) chewable tablet 200 mg [elemental]  200 mg Oral TID WITH MEALS    abiraterone tab   Oral DAILY WITH BREAKFAST    predniSONE (DELTASONE) tablet 5 mg  5 mg Oral DAILY WITH BREAKFAST    sodium chloride (NS) flush 5-40 mL  5-40 mL IntraVENous Q8H    heparin (porcine) injection 5,000 Units  5,000 Units SubCUTAneous Q8H       Review of Systems:  A comprehensive review of systems was negative except for that written in the HPI. Objective:   Vital Signs:    Visit Vitals  BP (P) 139/75 (BP 1 Location: Left upper arm, BP Patient Position: At rest)   Pulse 84   Temp (P) 98 °F (36.7 °C)   Resp 21   Ht 5' 9\" (1.753 m)   Wt 81.8 kg (180 lb 5.4 oz)   SpO2 94%   BMI 26.63 kg/m²       O2 Device: Nasal cannula   O2 Flow Rate (L/min): 3 l/min   Temp (24hrs), Av.7 °F (36.5 °C), Min:97.5 °F (36.4 °C), Max:98 °F (36.7 °C)       Intake/Output:   Last shift:      No intake/output data recorded. Last 3 shifts:  1901 -  0700  In: -   Out: 400 [Urine:400]    Intake/Output Summary (Last 24 hours) at 2022 1122  Last data filed at 2022 2000  Gross per 24 hour   Intake --   Output 400 ml   Net -400 ml        Physical Exam:   General:  Alert, cooperative, no distress, appears stated age. Head:  Normocephalic, without obvious abnormality, atraumatic. Eyes:  Conjunctivae/corneas clear. Nose: Nares normal. Septum midline.         Neck: Supple, symmetrical, trachea midline, Lungs:   Clear to auscultation bilaterally. Chest wall:  No tenderness or deformity. Heart:  Regular rate and rhythm, S1, S2 normal, no murmur, click, rub or gallop. Abdomen:   Soft, non-tender. Bowel sounds normal. No masses,  No organomegaly. Extremities: Extremities normal, atraumatic, no cyanosis or edema. Data review:     No results found for this or any previous visit (from the past 24 hour(s)).       Imaging:  I have personally reviewed the patients radiographs and have reviewed the reports:  CTA chest no PE and + CL emphysema no fibrosis no lung masses no effusions no ILD changes        Luis Thompson PA-C

## 2022-11-29 NOTE — PROGRESS NOTES
Pulse oximetry assessment   83% at rest on room air (if 88% or less, skip next steps)  80% while ambulating on room air  96% at rest on 3 LPM  95% while ambulating on 3 LPM

## 2022-11-29 NOTE — DISCHARGE SUMMARY
Discharge Summary       PATIENT ID: Sana Israel  MRN: [id number]   YOB: 1940    DATE OF ADMISSION: 11/23/2022  8:28 PM    DATE OF DISCHARGE: 11/29/22   PRIMARY CARE PROVIDER: Shawn Longoria DO     ATTENDING PHYSICIAN: Alanis Alvarez  DISCHARGING PROVIDER: Vicky Enciso MD    To contact this individual call [phone] and ask the  to page. If unavailable ask to be transferred the Adult Hospitalist Department. CONSULTATIONS: IP CONSULT TO PULMONOLOGY  IP CONSULT TO UROLOGY    PROCEDURES/SURGERIES: * No surgery found *    DISCHARGE DIAGNOSES:     Patient presents to the ER complaining of fall, history of prostate cancer, presents to the ER with fall, patient reports that he was walking down the stairs, tripped and fell backwards, hitting his head, did not lose consciousness, denies any pain or discomfort anywhere, had 2 episodes of nausea and vomiting, got concerned and came to the ER, patient was found to be hypoxic in the ER, and was requested to be admitted to the hospitalist service, denies any other complaints or problems    [address]:    Acute vs chronic hypoxic resp failure-   --CTA no pneumonia no PE  --Echo with bubble study no shunt seen  --Continue nebs steroids patient discharged on Symbicort and follow-up with pulmonology for outpatient PFTs as per their recommendation     GLF- likely due to hypoxia, PT eval. Home with home health  LYNSEY: renal function improved after hydration. Restart ACE at discharge  Rt hydroureter : suspect ASX small stone in tunnel, urologist consulted and following .  Renal function improved 1.05 (1.53) and he is not symptomatic ~ no need for stent or NT at this time , urologist following patient has prostate cancer and urologist plan for seeing the patient as an outpatient on December 15         Prostate cancer hx: urologist following follow-up as an outpatient recommended and scheduled  CAD resume home medication is stable       DISCHARGE DIAGNOSES / PLAN:      Discharge home with home oxygen    BMI: Body mass index is 26.63 kg/m². . This patient: Meets criteria for overweight given BMI >/= 25 and < 30 due to excess calories/nutritional. Weight loss and lifestyle modifications should be encouraged as an outpatient. PENDING TEST RESULTS:   At the time of discharge the following test results are still pending:      ADDITIONAL CARE RECOMMENDATIONS:        NOTIFY YOUR PHYSICIAN FOR ANY OF THE FOLLOWING:   Fever over 101 degrees for 24 hours. Chest pain, shortness of breath, fever, chills, nausea, vomiting, diarrhea, change in mentation, falling, weakness, bleeding. Severe pain or pain not relieved by medications, as well as any other signs or symptoms that you may have questions about. FOLLOW UP APPOINTMENTS:    Follow-up Information       Follow up With Specialties Details Why [address] [place] [address] Urology Urology Go on 12/15/2022 3 PM with Dr. Miri Valadez [phone] [address] [place] [address] Dr Ruth Zapata, DO Internal Medicine Physician Follow up in 1 week(s)  [address] [facility]  [address] [facility]      Koffi Key DO Internal Medicine Physician   [address] [facility]  [address] [facility]  [phone]                 DIET: Cardiac Diet    ACTIVITY: Activity as tolerated    EQUIPMENT needed:     DISCHARGE MEDICATIONS:  Current Discharge Medication List        START taking these medications    Details   budesonide-formoteroL (Symbicort) 160-4.5 mcg/actuation HFAA Take 2 Puffs by inhalation two (2) times a day for 30 days.   Qty: 1 Each, Refills: 1  Start date: 11/29/2022, End date: 12/29/2022           CONTINUE these medications which have NOT CHANGED    Details   LORazepam (ATIVAN) 0.5 mg tablet 1 daily prn anxiety  Qty: 30 Tablet, Refills: 0    Associated Diagnoses: Anxiety      predniSONE (DELTASONE) 5 mg tablet TAKE 1 TABLET BY MOUTH ONCE DAILY WITH FOOD      abiraterone 250 mg tab TAKE 1 TABLET BY MOUTH ONCE DAILY WITH A LOW FAT BREAKFAST      vit A/vit C/vit E/zinc/copper (PRESERVISION AREDS PO) 2 Tabs. Aspirin, Buffered 81 mg tab Take  by mouth. saw palmetto 160 mg cap Take 1 Cap by mouth two (2) times a day. multivitamin (ONE A DAY) tablet Take 1 Tab by mouth daily. lisinopriL (PRINIVIL, ZESTRIL) 5 mg tablet Take 1 tablet by mouth once daily  Qty: 90 Tablet, Refills: 0    Associated Diagnoses: Coronary artery disease involving native coronary artery of native heart without angina pectoris      bisacodyL (DULCOLAX) 5 mg EC tablet Take 5 mg by mouth daily. Indications: CONSTIPATION             DISPOSITION:  x  Home With:   OT x PT x HH  RN       Long term SNF/Inpatient Rehab    Independent/assisted living    Hospice    Other:       PATIENT CONDITION AT DISCHARGE:     Functional status    Poor    x Deconditioned     Independent      Cognition   x  Lucid     Forgetful     Dementia      Catheters/lines (plus indication)    Schaefer     PICC     PEG    x None      Code status   x  Full code     DNR      PHYSICAL EXAMINATION AT DISCHARGE:  General:          Alert, cooperative, no distress, appears stated age. HEENT:           Atraumatic, anicteric sclerae, pink conjunctivae                          No oral ulcers, mucosa moist, throat clear, dentition fair  Neck:               Supple, symmetrical  Lungs:             Clear to auscultation bilaterally. No Wheezing or Rhonchi. No rales. Chest wall:      No tenderness  No Accessory muscle use. Heart:              Regular  rhythm,  No  murmur   No edema  Abdomen:        Soft, non-tender. Not distended. Bowel sounds normal  Extremities:     No cyanosis. No clubbing,                            Skin turgor normal, Capillary refill normal  Skin:                Not pale. Not Jaundiced  No rashes   Psych:             Not anxious or agitated.   Neurologic:      Alert, moves all extremities, answers questions appropriately and responds to commands       CHRONIC MEDICAL DIAGNOSES:  Problem List as of 11/29/2022 Date Reviewed: 6/7/2022            Codes Class Noted - Resolved    Respiratory failure with hypoxia [facility]) ICD-10-CM: J96.91  ICD-9-CM: 518.81  11/24/2022 - Present        Primary hypertension ICD-10-CM: I10  ICD-9-CM: 401.9  3/22/2022 - Present        H/O partial nephrectomy ICD-10-CM: Z90.5  ICD-9-CM: V15.29  1/16/2017 - Present        Rosacea ICD-10-CM: L71.9  ICD-9-CM: 695.3  1/16/2017 - Present        S/P TURP ICD-10-CM: Z90.79  ICD-9-CM: V45.89  1/29/2016 - Present        Continuous leakage of urine ICD-10-CM: N39.45  ICD-9-CM: 788.37  1/29/2016 - Present        Renal mass ICD-10-CM: N28.89  ICD-9-CM: 593.9  6/24/2015 - Present        Stented coronary artery ICD-10-CM: Z95.5  ICD-9-CM: V45.82  8/15/2014 - Present        Anxiety ICD-10-CM: F41.9  ICD-9-CM: 300.00  8/15/2014 - Present        Rising PSA following treatment for malignant neoplasm of prostate ICD-10-CM: R97.21  ICD-9-CM: 790.93, V10.46  2013 - Present        CAD (coronary artery disease) ICD-10-CM: I25.10  ICD-9-CM: 414.00  12/23/2013 - Present        Old MI (myocardial infarction) ICD-10-CM: I25.2  ICD-9-CM: 412  12/23/2013 - Present        Prostate cancer ([facility] ICD-10-CM: C61  ICD-9-CM: 185  12/23/2013 - Present        Macular degeneration of both eyes, unspecified type ICD-10-CM: H35.30  ICD-9-CM: 362.50  12/23/2013 - Present        RESOLVED: Renal cell adenocarcinoma ([facility] ICD-10-CM: C64.9  ICD-9-CM: 189.0  4/27/2015 - 10/22/2019           Greater than 30  minutes were spent with the patient on counseling and coordination of care    Signed:   Dieter Frias MD  11/29/2022  12:13 PM

## 2022-11-29 NOTE — PROGRESS NOTES
Medicare discharge appeal received from [facility]. Case control # [id number]. All requested notes, DND, and IMM letter was sent electronically to [facility]. Patient  given a copy of the DND and a copy was placed on patient's chart.  Will continue to monitor the status of the appeal.     Barbi Keith LCSW, ACM-SW  Case Management [address] [place] [address]  C: [phone] patient

## 2022-11-29 NOTE — PROGRESS NOTES
Transition Plan of Care  RUR 12%-Low  Disposition-discharged per attending. Spoke with wife and daughter Dallas Arenas and they are adamant regarding him discharging home today. CM offered that they can appeal discharge with Medicare and they will make a decision within 24/hrs. Attending updated on this development. Family also requesting to be seen by a different hospitalist. CM sent referral to [employer] Adv[employer]acy. CM will deliver IM letter to family and it is likely they will appeal.    Gee Nolan with attending regarding family concerns. Discharge will go forward. CM spoke with daughter at the bedside and she does not think he is clinically stable to discharge. CM explained appeal process with daughter and she is starting appeal at this time. Attending aware of this.

## 2022-11-30 VITALS
SYSTOLIC BLOOD PRESSURE: 134 MMHG | HEART RATE: 78 BPM | HEIGHT: 69 IN | DIASTOLIC BLOOD PRESSURE: 68 MMHG | OXYGEN SATURATION: 95 % | BODY MASS INDEX: 26.71 KG/M2 | TEMPERATURE: 98 F | RESPIRATION RATE: 19 BRPM | WEIGHT: 180.34 LBS

## 2022-11-30 LAB
ALBUMIN SERPL-MCNC: 2.9 G/DL (ref 3.5–5)
ALBUMIN/GLOB SERPL: 0.9 {RATIO} (ref 1.1–2.2)
ALP SERPL-CCNC: 90 U/L (ref 45–117)
ALT SERPL-CCNC: 28 U/L (ref 12–78)
ANION GAP SERPL CALC-SCNC: 7 MMOL/L (ref 5–15)
AST SERPL-CCNC: 7 U/L (ref 15–37)
BACTERIA SPEC CULT: NORMAL
BASOPHILS # BLD: 0.1 K/UL (ref 0–0.1)
BASOPHILS NFR BLD: 1 % (ref 0–1)
BILIRUB SERPL-MCNC: 0.5 MG/DL (ref 0.2–1)
BUN SERPL-MCNC: 27 MG/DL (ref 6–20)
BUN/CREAT SERPL: 24 (ref 12–20)
CALCIUM SERPL-MCNC: 10 MG/DL (ref 8.5–10.1)
CHLORIDE SERPL-SCNC: 108 MMOL/L (ref 97–108)
CO2 SERPL-SCNC: 26 MMOL/L (ref 21–32)
CREAT SERPL-MCNC: 1.14 MG/DL (ref 0.7–1.3)
DIFFERENTIAL METHOD BLD: ABNORMAL
EOSINOPHIL # BLD: 0.3 K/UL (ref 0–0.4)
EOSINOPHIL NFR BLD: 3 % (ref 0–7)
ERYTHROCYTE [DISTWIDTH] IN BLOOD BY AUTOMATED COUNT: 12.4 % (ref 11.5–14.5)
GLOBULIN SER CALC-MCNC: 3.3 G/DL (ref 2–4)
GLUCOSE SERPL-MCNC: 117 MG/DL (ref 65–100)
HCT VFR BLD AUTO: 36.4 % (ref 36.6–50.3)
HGB BLD-MCNC: 11.8 G/DL (ref 12.1–17)
IMM GRANULOCYTES # BLD AUTO: 0.1 K/UL (ref 0–0.04)
IMM GRANULOCYTES NFR BLD AUTO: 0 % (ref 0–0.5)
LYMPHOCYTES # BLD: 1.6 K/UL (ref 0.8–3.5)
LYMPHOCYTES NFR BLD: 13 % (ref 12–49)
MCH RBC QN AUTO: 31.9 PG (ref 26–34)
MCHC RBC AUTO-ENTMCNC: 32.4 G/DL (ref 30–36.5)
MCV RBC AUTO: 98.4 FL (ref 80–99)
MONOCYTES # BLD: 0.7 K/UL (ref 0–1)
MONOCYTES NFR BLD: 6 % (ref 5–13)
NEUTS SEG # BLD: 9 K/UL (ref 1.8–8)
NEUTS SEG NFR BLD: 77 % (ref 32–75)
NRBC # BLD: 0 K/UL (ref 0–0.01)
NRBC BLD-RTO: 0 PER 100 WBC
PLATELET # BLD AUTO: 220 K/UL (ref 150–400)
PMV BLD AUTO: 9.9 FL (ref 8.9–12.9)
POTASSIUM SERPL-SCNC: 3.5 MMOL/L (ref 3.5–5.1)
PROT SERPL-MCNC: 6.2 G/DL (ref 6.4–8.2)
RBC # BLD AUTO: 3.7 M/UL (ref 4.1–5.7)
SERVICE CMNT-IMP: NORMAL
SODIUM SERPL-SCNC: 141 MMOL/L (ref 136–145)
WBC # BLD AUTO: 11.7 K/UL (ref 4.1–11.1)

## 2022-11-30 PROCEDURE — 77010033678 HC OXYGEN DAILY

## 2022-11-30 PROCEDURE — 74011636637 HC RX REV CODE- 636/637: Performed by: HOSPITALIST

## 2022-11-30 PROCEDURE — 74011000250 HC RX REV CODE- 250: Performed by: FAMILY MEDICINE

## 2022-11-30 PROCEDURE — 74011250637 HC RX REV CODE- 250/637: Performed by: HOSPITALIST

## 2022-11-30 PROCEDURE — 36415 COLL VENOUS BLD VENIPUNCTURE: CPT

## 2022-11-30 PROCEDURE — 94640 AIRWAY INHALATION TREATMENT: CPT

## 2022-11-30 PROCEDURE — 85025 COMPLETE CBC W/AUTO DIFF WBC: CPT

## 2022-11-30 PROCEDURE — 80053 COMPREHEN METABOLIC PANEL: CPT

## 2022-11-30 PROCEDURE — 74011000250 HC RX REV CODE- 250: Performed by: INTERNAL MEDICINE

## 2022-11-30 PROCEDURE — 97535 SELF CARE MNGMENT TRAINING: CPT

## 2022-11-30 PROCEDURE — 74011250636 HC RX REV CODE- 250/636: Performed by: FAMILY MEDICINE

## 2022-11-30 PROCEDURE — 74011000250 HC RX REV CODE- 250: Performed by: HOSPITALIST

## 2022-11-30 RX ADMIN — ABIRATERONE ACETATE: 250 TABLET ORAL at 08:51

## 2022-11-30 RX ADMIN — IPRATROPIUM BROMIDE AND ALBUTEROL SULFATE 3 ML: .5; 3 SOLUTION RESPIRATORY (INHALATION) at 10:28

## 2022-11-30 RX ADMIN — SODIUM CHLORIDE, PRESERVATIVE FREE 10 ML: 5 INJECTION INTRAVENOUS at 06:11

## 2022-11-30 RX ADMIN — HEPARIN SODIUM 5000 UNITS: 5000 INJECTION, SOLUTION INTRAVENOUS; SUBCUTANEOUS at 15:06

## 2022-11-30 RX ADMIN — CALCIUM CARBONATE (ANTACID) CHEW TAB 500 MG 200 MG: 500 CHEW TAB at 08:51

## 2022-11-30 RX ADMIN — BUDESONIDE 500 MCG: 0.5 INHALANT RESPIRATORY (INHALATION) at 10:28

## 2022-11-30 RX ADMIN — LISINOPRIL 5 MG: 5 TABLET ORAL at 08:51

## 2022-11-30 RX ADMIN — DOCUSATE SODIUM 100 MG: 100 CAPSULE, LIQUID FILLED ORAL at 08:51

## 2022-11-30 RX ADMIN — HEPARIN SODIUM 5000 UNITS: 5000 INJECTION, SOLUTION INTRAVENOUS; SUBCUTANEOUS at 06:11

## 2022-11-30 RX ADMIN — HEPARIN SODIUM 5000 UNITS: 5000 INJECTION, SOLUTION INTRAVENOUS; SUBCUTANEOUS at 00:08

## 2022-11-30 RX ADMIN — CALCIUM CARBONATE (ANTACID) CHEW TAB 500 MG 200 MG: 500 CHEW TAB at 15:06

## 2022-11-30 RX ADMIN — PREDNISONE 5 MG: 5 TABLET ORAL at 08:51

## 2022-11-30 RX ADMIN — ARFORMOTEROL TARTRATE 15 MCG: 15 SOLUTION RESPIRATORY (INHALATION) at 10:28

## 2022-11-30 RX ADMIN — SODIUM CHLORIDE, PRESERVATIVE FREE 10 ML: 5 INJECTION INTRAVENOUS at 15:12

## 2022-11-30 NOTE — PROGRESS NOTES
Hospital follow-up PCP transitional care appointment has been scheduled with Pavan Lloyd NP for Friday, December 9th, 2022 at 11:15p.mChan Roca Pending patient discharge.   Krishna Pruitt, Care Management Assistant

## 2022-11-30 NOTE — DISCHARGE SUMMARY
Discharge Summary       PATIENT ID: Pasha Hills  MRN: [id number]   YOB: 1940    DATE OF ADMISSION: 11/23/2022  8:28 PM    DATE OF DISCHARGE: 11/29/22   PRIMARY CARE PROVIDER: Kem Sutton DO     ATTENDING PHYSICIAN: Tex Carrel  DISCHARGING PROVIDER: Lourdes Brown MD    To contact this individual call [phone] and ask the  to page. If unavailable ask to be transferred the Adult Hospitalist Department. CONSULTATIONS: IP CONSULT TO PULMONOLOGY  IP CONSULT TO UROLOGY    PROCEDURES/SURGERIES: * No surgery found *    DISCHARGE DIAGNOSES:     Patient presents to the ER complaining of fall, history of prostate cancer, presents to the ER with fall, patient reports that he was walking down the stairs, tripped and fell backwards, hitting his head, did not lose consciousness, denies any pain or discomfort anywhere, had 2 episodes of nausea and vomiting, got concerned and came to the ER, patient was found to be hypoxic in the ER, and was requested to be admitted to the hospitalist service, denies any other complaints or problems    [address]:    Acute vs chronic hypoxic resp failure-   --CTA no pneumonia no PE  --Echo with bubble study no shunt seen  --Continue nebs steroids patient discharged on Symbicort and follow-up with pulmonology for outpatient PFTs as per their recommendation     GLF- likely due to hypoxia, PT eval. Home with home health  LYNSEY: renal function improved after hydration. Restart ACE at discharge  Rt hydroureter : suspect ASX small stone in tunnel, urologist consulted and following .  Renal function improved 1.05 (1.53) and he is not symptomatic ~ no need for stent or NT at this time , urologist following patient has prostate cancer and urologist plan for seeing the patient as an outpatient on December 15         Prostate cancer hx: urologist following follow-up as an outpatient recommended and scheduled  CAD resume home medication is stable       DISCHARGE DIAGNOSES / PLAN:      Discharge home with home oxygen patient appealed discharge yesterday and discharge order was upheld  working on patient's discharge home today    BMI: Body mass index is 26.63 kg/m². . This patient: Meets criteria for overweight given BMI >/= 25 and < 30 due to excess calories/nutritional. Weight loss and lifestyle modifications should be encouraged as an outpatient. PENDING TEST RESULTS:   At the time of discharge the following test results are still pending:      ADDITIONAL CARE RECOMMENDATIONS:        NOTIFY YOUR PHYSICIAN FOR ANY OF THE FOLLOWING:   Fever over 101 degrees for 24 hours. Chest pain, shortness of breath, fever, chills, nausea, vomiting, diarrhea, change in mentation, falling, weakness, bleeding. Severe pain or pain not relieved by medications, as well as any other signs or symptoms that you may have questions about. FOLLOW UP APPOINTMENTS:    Follow-up Information       Follow up With Specialties Details Why [facility] Urology Go on 12/15/2022 3 PM with Dr. Ld Finch [phone] [address] [place] [address] Dr Zarina Uribe, [address] [facility] Internal Medicine Physician Follow up in 1 week(s) Hospital follow up with PCP scheduled for Friday, December 9th, 2022 at 11:15 a. m.with Anderson Aguilera, BECKIE [address] Follow up  [address] [facility] [place]. [address] [facility]  Follow up [address]  [phone]               DIET: Cardiac Diet    ACTIVITY: Activity as tolerated    EQUIPMENT needed:     DISCHARGE MEDICATIONS:  Current Discharge Medication List        START taking these medications    Details   budesonide-formoteroL (Symbicort) 160-4.5 mcg/actuation HFAA Take 2 Puffs by inhalation two (2) times a day for 30 days.   Qty: 1 Each, Refills: 1  Start date: 11/29/2022, End date: 12/29/2022           CONTINUE these medications which have NOT CHANGED    Details   LORazepam (ATIVAN) 0.5 mg tablet 1 daily prn anxiety  Qty: 30 Tablet, Refills: 0    Associated Diagnoses: Anxiety      predniSONE (DELTASONE) 5 mg tablet TAKE 1 TABLET BY MOUTH ONCE DAILY WITH FOOD      abiraterone 250 mg tab TAKE 1 TABLET BY MOUTH ONCE DAILY WITH A LOW FAT BREAKFAST      vit A/vit C/vit E/zinc/copper (PRESERVISION AREDS PO) 2 Tabs. Aspirin, Buffered 81 mg tab Take  by mouth. saw palmetto 160 mg cap Take 1 Cap by mouth two (2) times a day. multivitamin (ONE A DAY) tablet Take 1 Tab by mouth daily. lisinopriL (PRINIVIL, ZESTRIL) 5 mg tablet Take 1 tablet by mouth once daily  Qty: 90 Tablet, Refills: 0    Associated Diagnoses: Coronary artery disease involving native coronary artery of native heart without angina pectoris      bisacodyL (DULCOLAX) 5 mg EC tablet Take 5 mg by mouth daily. Indications: CONSTIPATION             DISPOSITION:  x  Home With:   OT x PT x HH  RN       Long term SNF/Inpatient Rehab    Independent/assisted living    Hospice    Other:       PATIENT CONDITION AT DISCHARGE:     Functional status    Poor    x Deconditioned     Independent      Cognition   x  Lucid     Forgetful     Dementia      Catheters/lines (plus indication)    Schaefer     PICC     PEG    x None      Code status   x  Full code     DNR      PHYSICAL EXAMINATION AT DISCHARGE:  General:          Alert, cooperative, no distress, appears stated age. HEENT:           Atraumatic, anicteric sclerae, pink conjunctivae                          No oral ulcers, mucosa moist, throat clear, dentition fair  Neck:               Supple, symmetrical  Lungs:             Clear to auscultation bilaterally. No Wheezing or Rhonchi. No rales. Chest wall:      No tenderness  No Accessory muscle use.   Heart:              Regular  rhythm,  No  murmur   No edema  Abdomen:        Soft, non-tender. Not distended. Bowel sounds normal  Extremities:     No cyanosis. No clubbing,                            Skin turgor normal, Capillary refill normal  Skin:                Not pale. Not Jaundiced  No rashes   Psych:             Not anxious or agitated.   Neurologic:      Alert, moves all extremities, answers questions appropriately and responds to commands       CHRONIC MEDICAL DIAGNOSES:  Problem List as of 11/30/2022 Date Reviewed: 6/7/2022            Codes Class Noted - Resolved    Respiratory failure with hypoxia [facility]) ICD-10-CM: J96.91  ICD-9-CM: 518.81  11/24/2022 - Present        Primary hypertension ICD-10-CM: I10  ICD-9-CM: 401.9  3/22/2022 - Present        H/O partial nephrectomy ICD-10-CM: Z90.5  ICD-9-CM: V15.29  1/16/2017 - Present        Rosacea ICD-10-CM: L71.9  ICD-9-CM: 695.3  1/16/2017 - Present        S/P TURP ICD-10-CM: Z90.79  ICD-9-CM: V45.89  1/29/2016 - Present        Continuous leakage of urine ICD-10-CM: N39.45  ICD-9-CM: 788.37  1/29/2016 - Present        Renal mass ICD-10-CM: N28.89  ICD-9-CM: 593.9  6/24/2015 - Present        Stented coronary artery ICD-10-CM: Z95.5  ICD-9-CM: V45.82  8/15/2014 - Present        Anxiety ICD-10-CM: F41.9  ICD-9-CM: 300.00  8/15/2014 - Present        Rising PSA following treatment for malignant neoplasm of prostate ICD-10-CM: R97.21  ICD-9-CM: 790.93, V10.46  2013 - Present        CAD (coronary artery disease) ICD-10-CM: I25.10  ICD-9-CM: 414.00  12/23/2013 - Present        Old MI (myocardial infarction) ICD-10-CM: I25.2  ICD-9-CM: 412  12/23/2013 - Present        Prostate cancer ([facility] ICD-10-CM: C61  ICD-9-CM: 185  12/23/2013 - Present        Macular degeneration of both eyes, unspecified type ICD-10-CM: H35.30  ICD-9-CM: 362.50  12/23/2013 - Present        RESOLVED: Renal cell adenocarcinoma ([facility] ICD-10-CM: C64.9  ICD-9-CM: 189.0  4/27/2015 - 10/22/2019         Greater than 30  minutes were spent with the patient on counseling and coordination of care    Signed:   Yossi Momin MD  11/30/2022  12:13 PM

## 2022-11-30 NOTE — PROGRESS NOTES
Patient appealed their discharge 11/29/2022 Case control # [id number]. Umu Gutierrez complted review and upheld the physician decision to end Medicare coverage. Services will no longer be covered by the Medicare program as of 12/1/2022.  Umu Gutierrez notified patient 11/30/2022 @ 10:44 AM.     Kiah Giron LCSW, ACM-SW  Case Management [address] [place] [address]  C: [phone]

## 2022-11-30 NOTE — PROGRESS NOTES
Problem: Self Care Deficits Care Plan (Adult)  Goal: *Acute Goals and Plan of Care (Insert Text)  Description: FUNCTIONAL STATUS PRIOR TO ADMISSION: Patient was independent and active without use of DME.     HOME SUPPORT: The patient lived with wife and daughter but did not require assist.    Occupational Therapy Goals  Initiated 11/27/2022  1. Patient will perform standing grooming task with supervision/set-up within 7 day(s). 2.  Patient will perform bathing with minimal assistance/contact guard assist within 7 day(s). 3.  Patient will perform upper body dressing with supervision/set-up within 7 day(s). 4.  Patient will perform toilet transfers with supervision/set-up within 7 day(s). 5.  Patient will perform all aspects of toileting with minimal assistance/contact guard assist within 7 day(s). 6.  Patient will participate in upper extremity therapeutic exercise/activities with independence for 10 minutes within 7 day(s). 7.  Patient will utilize energy conservation techniques during functional activities with verbal cues within 7 day(s). Outcome: Progressing Towards Goal   OCCUPATIONAL THERAPY TREATMENT  Patient: Kentrell Zamudio (9 y.o. male)  Date: 11/30/2022  Diagnosis: Respiratory failure with hypoxia ([facility] [address] [J96.91] <principal problem not specified>      Precautions: Fall, Bed Alarm, Contact  Chart, occupational therapy assessment, plan of care, and goals were reviewed. ASSESSMENT  Patient continues with skilled OT services and is progressing towards goals. Pt's performance of ADL/IADL tasks continues to be limited at this time by decreased standing balance, activity tolerance, and LB reach. Pt received semi-supine on 3L NC, and agreeable to participation in therapy session. He demonstrated all bed mobility, functional transfers, bathroom mobility, toileting, and standing ADLs with supervision and min verbal cues for safe use of RW throughout routines.  While seated on toilet pt requiring brief change and light min A required for RLE to be threaded through brief (likely d/t small bathroom environment/limited space). Pt requesting to return to bed and therefore returned to semi-supine. All VSS throughout session on 3L NC. Recommend d/c home with HHOT and assist/supervision for safety with OOB mobility/ADL/IADL tasks. Current Level of Function Impacting Discharge (ADLs): supervision functional transfers in prep for ADL tasks, supervision seated distal LB dressing, min A to don brief, supervision standing ADLs at sink     Other factors to consider for discharge: PLOF, O2 needs          PLAN :  Patient continues to benefit from skilled intervention to address the above impairments. Continue treatment per established plan of care to address goals. Recommendation for discharge: (in order for the patient to meet his/her long term goals)  Recommend d/c home with HHOT and assist/supervision for safety with OOB mobility/ADL/IADL tasks. This discharge recommendation:  Has been made in collaboration with the attending provider and/or case management    IF patient discharges home will need the following DME: tub transfer bench and walker: rolling       SUBJECTIVE:   Patient stated I have a step over tub.     OBJECTIVE DATA SUMMARY:   Cognitive/Behavioral Status:  Neurologic State: Alert  Orientation Level: Oriented X4  Cognition: Follows commands  Perception: Appears intact  Perseveration: No perseveration noted  Safety/Judgement: Awareness of environment    Functional Mobility and Transfers for ADLs:  Bed Mobility:  Supine to Sit: Supervision  Sit to Supine: Supervision  Scooting: Supervision    Transfers:  Sit to Stand: Supervision  Functional Transfers  Bathroom Mobility: Supervision/set up  Toilet Transfer : Supervision       Balance:  Sitting: Intact  Sitting - Static: Good (unsupported)  Sitting - Dynamic: Good (unsupported)  Standing: Impaired; With support  Standing - Static: Good;Constant support  Standing - Dynamic : Good;Fair;Constant support    ADL Intervention:       Grooming  Grooming Assistance: Supervision  Position Performed: Standing  Washing Hands: Supervision              Lower Body Dressing Assistance  Underpants: Minimum assistance (to thread RLE through brief, seated on toilet)  Socks: Supervision (seated EOB)  Leg Crossed Method Used: Yes  Position Performed: Seated edge of bed;Standing  Cues: Verbal cues provided    Toileting  Toileting Assistance: Supervision  Bladder Hygiene: Supervision  Bowel Hygiene: Supervision  Clothing Management: Supervision  Adaptive Equipment: Grab bars; Walker    Cognitive Retraining  Safety/Judgement: Awareness of environment      Pain:  Pt reporting increase in back pain while seated on toilet     Activity Tolerance:   Good    After treatment patient left in no apparent distress:   Supine in bed, Call bell within reach, Caregiver / family present, and Side rails x 3    COMMUNICATION/COLLABORATION:   The patients plan of care was discussed with: Registered nurse.      CECILIA Gonazlez, OTR/L  Time Calculation: 26 mins

## 2022-11-30 NOTE — PROGRESS NOTES
Transition Plan of Care  RUR 11%-Low  Disposition-family's appeal of discharge was denied. Spoke with daughter Chrissy Little regarding details of discharge. Requested for therapy to see patient this am with family present so they can see needs at home. CM sent referral to [facility] for home health services. Zoye Morales works for that agency. Home oxygen has been order. Daughter has chosen [facility] and referral sent via [employer]. Will place transport on Will Call with [facility]. Transition of Care Plan:     The Plan for Transition of Care is related to the following treatment goals: home with home health and oxygen    The Patient and/or patient representative zoey Morales  was provided with a choice of provider and agrees  with the discharge plan. Yes [x] No []    A Freedom of choice list was provided with basic dialogue that supports the patient's individualized plan of care/goals and shares the quality data associated with the providers. update-home health will be provided by [facility] and oxygen provided by [facility] nd both have been confirmed. [facility] transport at 1600. Updated daughter and patient at the bedside.

## 2022-12-01 ENCOUNTER — PATIENT OUTREACH (OUTPATIENT)
Dept: CASE MANAGEMENT | Age: 82
End: 2022-12-01

## 2022-12-01 NOTE — PROGRESS NOTES
Care Transitions Initial Call    Call within 2 business days of discharge: Yes     Patient: Fransisco Bowers Patient : 1940 MRN: [id number]    Last Discharge [address]       Date Complaint Diagnosis Description Type Department Provider    22 Fall Acute respiratory failure with hypoxia ([facility] [address] . .. ED to Hosp-Admission (Discharged) (ADMIT) Celestina La MD; Armond Jones. .. Was this an external facility discharge? No     Challenges to be reviewed by the provider   Additional needs identified to be addressed with provider:    TEDDY Christiansen (pulm) appt       Method of communication with provider : none    Discussed COVID-19 related testing which was available at this time. Test results were negative. Patient informed of results, if available? Per hospital     Advance Care Planning:   Does patient have an Advance Directive: not on file; education provided, referral to internal ACP facilitator. Inpatient Readmission Risk score: Unplanned Readmit Risk Score: 13.4    Was this a readmission? no   Patient stated reason for the admission: fall    Patients top risk factors for readmission: functional physical ability, falls, and medical condition-respiratory failure    Interventions to address risk factors: Scheduled appointment with PCP-, Scheduled appointment with Specialist-12/15 urology, Obtained and reviewed discharge summary and/or continuity of care documents, and Education of patient/family/caregiver/guardian to support self-management-no falls    Care Transition Nurse (CTN) contacted the  patient's son, Magui Woodward, HIPAA verified dated 7/15/2021)  by telephone to perform post hospital discharge assessment. Verified name and  with family as identifiers. Provided introduction to self, and explanation of the CTN role. Endorses weakness. Reports poor appetite, compliance with 3 L of oxygen via NC with oxygen saturation between 91-92%. Reviewed fall prevention and safety precaution with oxygen tubing. Reports the patient is ambulating with walker with stand by assist.    CTN reviewed discharge instructions, medical action plan and red flags with family who verbalized understanding. Were discharge instructions available to patient? yes. Reviewed appropriate site of care based on symptoms and resources available to patient including: PCP, Specialist, and Home Health. Family given an opportunity to ask questions and does not have any further questions or concerns at this time. The family agrees to contact the PCP office for questions related to their healthcare. Medication reconciliation was performed with family, who verbalizes understanding of administration of home medications. Advised obtaining a 90-day supply of all daily and as-needed medications. Referral to Pharm D needed: no     Home Health/Outpatient orders at discharge: home health care, PT, OT, SN, and personal care aide  [address]: Jules Bellamy  Date of initial visit: 12/1/2022    Durable Medical Equipment ordered at discharge: [address] received: yes    Was patient discharged with a pulse oximeter? no    Discussed follow-up appointments. If no appointment was previously scheduled, appointment scheduling offered: yes. Is follow up appointment scheduled within 7 days of discharge? no.   [facility] follow up appointment(s):   Future Appointments   Date Time Provider Jenniffer Roach   12/9/2022 11:15 AM Dave Smith NP [facility] BS [facility]     Non-BS follow up appointment(s): 12/8 nidia Durant; 12/15 ([phone])  Dr. Kiko Vázquez, urology    Plan for follow-up call in 5-7 days based on severity of symptoms and risk factors. Plan for next call: symptom management-sob and community resources-  CTN provided contact information for future needs.      Goals Addressed                   This Visit's Progress     Prevent complications post hospitalization.           12/01/22  Pt/family will report compliance with medications ie oxygen and Symbicort  Will improve baseline activity with assistance from [place] PT by the end of EVIN episode  Will participate in [place] [facility] to improve strength and mobility  Absence of falls  Will attend follow up appts with pulm, pcp, and urology  Appetite will improve  Pt will remain out of the hospital or ER for remainder of EVIN period

## 2022-12-02 ENCOUNTER — PATIENT OUTREACH (OUTPATIENT)
Dept: CASE MANAGEMENT | Age: 82
End: 2022-12-02

## 2022-12-02 NOTE — ACP (ADVANCE CARE PLANNING)
Initial outbound call made to patient who requested docs be emailed to [email or web address]. Follow up scheduled for one week.

## 2022-12-09 ENCOUNTER — OFFICE VISIT (OUTPATIENT)
Dept: INTERNAL MEDICINE CLINIC | Age: 82
End: 2022-12-09
Payer: MEDICARE

## 2022-12-09 VITALS
OXYGEN SATURATION: 99 % | DIASTOLIC BLOOD PRESSURE: 64 MMHG | HEART RATE: 91 BPM | RESPIRATION RATE: 12 BRPM | SYSTOLIC BLOOD PRESSURE: 116 MMHG | BODY MASS INDEX: 24.44 KG/M2 | HEIGHT: 69 IN | WEIGHT: 165 LBS

## 2022-12-09 DIAGNOSIS — Z76.89 ENCOUNTER FOR SUPPORT AND COORDINATION OF TRANSITION OF CARE: Primary | ICD-10-CM

## 2022-12-09 DIAGNOSIS — N28.9 RENAL FUNCTION IMPAIRMENT: ICD-10-CM

## 2022-12-09 DIAGNOSIS — I10 PRIMARY HYPERTENSION: ICD-10-CM

## 2022-12-09 DIAGNOSIS — Z99.81 OXYGEN DEPENDENT: ICD-10-CM

## 2022-12-09 NOTE — PROGRESS NOTES
HISTORY OF PRESENT ILLNESS  Jerry Angel is a 81 y.o. male. Patient was seen with his son for a transition of care. PMH was listed and below. Was in the hospital after a fall. No injury and CT was stable. But patient was found to be hypoxic and with acute v chronic renal function. Patient had to be placed on NC at 3L. Oxygen levels would drop with exertion. Patient ECHO and bubble study were stable. Per son had a follow up this week and next week will began to titrate the oxygen net week but while at rest.   Had what appeared to be kidney dysfunction. He has been followed up with urology since discharge. Scan is being ordered for further study. Cr reasoned to fluids. Patient is ow back at home with [facility] PT/OT. Is need of help to complete ADLs. Visit Vitals  /64 (BP 1 Location: Left upper arm, BP Patient Position: Sitting, BP Cuff Size: Adult)   Pulse 91   Resp 12   Ht 5' 9\" (1.753 m)   Wt 165 lb (74.8 kg)   SpO2 99%   BMI 24.37 kg/m²     Past Medical History:   Diagnosis Date    Bladder cancer ([facility] [address]     CAD (coronary artery disease)     MI 2004    Cancer [facility])     prostate s/p xrt    Macular degeneration     MI (myocardial infarction) ([facility] [address] 01/01/2004     Past Surgical History:   Procedure Laterality Date    HX APPENDECTOMY  1955    HX CATARACT REMOVAL      bilateral    HX UROLOGICAL  6/24/15    RIGHT ROBOTIC PARTIAL NEPHRECTOMY  (LATEX ALLERGY), renal exploration, cystectomy x 3    AL CARDIAC SURG PROCEDURE UNLIST      2 Stents     Family History   Problem Relation Age of Onset    Alzheimer's Disease Mother     Cancer Father         colon    No Known Problems Sister     No Known Problems Sister     No Known Problems Sister      Outpatient Encounter Medications as of 12/9/2022   Medication Sig Dispense Refill    budesonide-formoteroL (Symbicort) 160-4.5 mcg/actuation HFAA Take 2 Puffs by inhalation two (2) times a day for 30 days.  1 Each 1    lisinopriL (PRINIVIL, ZESTRIL) 5 mg tablet Take 1 tablet by mouth once daily 90 Tablet 0    LORazepam (ATIVAN) 0.5 mg tablet 1 daily prn anxiety 30 Tablet 0    predniSONE (DELTASONE) 5 mg tablet TAKE 1 TABLET BY MOUTH ONCE DAILY WITH FOOD      abiraterone 250 mg tab TAKE 1 TABLET BY MOUTH ONCE DAILY WITH A LOW FAT BREAKFAST      vit A/vit C/vit E/zinc/copper (PRESERVISION AREDS PO) 2 Tabs. Aspirin, Buffered 81 mg tab Take  by mouth.      bisacodyL (DULCOLAX) 5 mg EC tablet Take 5 mg by mouth daily. Indications: CONSTIPATION      saw palmetto 160 mg cap Take 1 Cap by mouth two (2) times a day. multivitamin (ONE A DAY) tablet Take 1 Tab by mouth daily. No facility-administered encounter medications on file as of 12/9/2022. HPI    Review of Systems   Constitutional:  Negative for chills and fever. Respiratory:  Negative for cough, shortness of breath and wheezing. Cardiovascular:  Negative for chest pain and palpitations. Gastrointestinal: Negative. Genitourinary:  Negative for hematuria and urgency. Neurological: Negative. Psychiatric/Behavioral: Negative. Physical Exam  Vitals and nursing note reviewed. Cardiovascular:      Rate and Rhythm: Normal rate and regular rhythm. Pulmonary:      Effort: Pulmonary effort is normal. No respiratory distress. Breath sounds: Normal breath sounds. Comments: NC at 3 L  Abdominal:      Palpations: Abdomen is soft. Musculoskeletal:      Right lower leg: No edema. Left lower leg: No edema. Neurological:      Mental Status: He is alert and oriented to person, place, and time. Psychiatric:         Behavior: Behavior normal.       ASSESSMENT and PLAN  Diagnoses and all orders for this visit:    1. Encounter for support and coordination of transition of care  -     [address] [place] [address]    2. Oxygen dependent        -     patient to wean oxygen per pulmonary direction   3. Renal function impairment        -     cr being followed by urology. Push gentle fluids daily   4.  Primary hypertension      Follow-up and Dispositions    Return in about 2 months (around 2/9/2023), or if symptoms worsen or fail to improve.       lab results and schedule of future lab studies reviewed with patient  reviewed diet, exercise and weight control  reviewed medications and side effects in detail

## 2022-12-09 NOTE — PROGRESS NOTES
ADVISED PATIENT OF THE FOLLOWING HEALTH MAINTAINCE DUE  Health Maintenance Due   Topic Date Due    Pneumococcal 65+ years (1 - PCV) Never done    Shingrix Vaccine Age 50> (1 of 2) Never done    COVID-19 Vaccine (3 - Booster for [employer] series) 05/05/2021    Medicare Yearly Exam  07/17/2022    Flu Vaccine (1) Never done    DTaP/Tdap/Td series (1 - Tdap) 11/25/2022      Chief Complaint   Patient presents with    Hospital Follow Up    Discuss Medications       1. \"Have you been to the ER, urgent care clinic since your last visit? Hospitalized since your last visit? \"  11/23/22 for falling    2. \"Have you seen or consulted any other health care providers outside of the [address] [place] [address] since your last visit? \" No     3. For patients aged 39-70: Has the patient had a colonoscopy / FIT/ Cologuard? NA - based on age      If the patient is female:    4. For patients aged 41-77: Has the patient had a mammogram within the past 2 years? NA - based on age or sex      11. For patients aged 21-65: Has the patient had a pap smear?  NA - based on age or sex'

## 2022-12-12 ENCOUNTER — PATIENT OUTREACH (OUTPATIENT)
Dept: CASE MANAGEMENT | Age: 82
End: 2022-12-12

## 2022-12-12 NOTE — ACP (ADVANCE CARE PLANNING)
Outbound call made to patient for follow up. Patient states he is not ready to complete and requests to call this ACM back when ready. No further outreach scheduled.

## 2022-12-19 ENCOUNTER — PATIENT OUTREACH (OUTPATIENT)
Dept: CASE MANAGEMENT | Age: 82
End: 2022-12-19

## 2022-12-30 ENCOUNTER — PATIENT OUTREACH (OUTPATIENT)
Dept: CASE MANAGEMENT | Age: 82
End: 2022-12-30

## 2022-12-30 NOTE — PROGRESS NOTES
Patient has graduated from the Transitions of Care Coordination  program on 12/30/2022. Patient/family has the ability to self-manage at this time Care management goals have been completed. Patient was not referred to the [facility] team for further management. Goals Addressed                   This Visit's Progress     COMPLETED: Prevent complications post hospitalization. 12/01/22  Pt/family will report compliance with medications ie oxygen and Symbicort  Will improve baseline activity with assistance from [facility] PT by the end of EVIN episode  Will participate in [facility] PT to improve strength and mobility  Absence of falls  Will attend follow up appts with pulm, pcp, and urology  Appetite will improve  Pt will remain out of the hospital or ER for remainder of EVIN period              Patient has Care Transition Nurse's contact information for any further questions, concerns, or needs. Patients upcoming visits:  No future appointments.

## 2023-02-08 ENCOUNTER — OFFICE VISIT (OUTPATIENT)
Dept: INTERNAL MEDICINE CLINIC | Age: 83
End: 2023-02-08
Payer: MEDICARE

## 2023-02-08 VITALS
RESPIRATION RATE: 12 BRPM | SYSTOLIC BLOOD PRESSURE: 110 MMHG | DIASTOLIC BLOOD PRESSURE: 60 MMHG | BODY MASS INDEX: 25.21 KG/M2 | HEART RATE: 90 BPM | HEIGHT: 69 IN | WEIGHT: 170.2 LBS | OXYGEN SATURATION: 96 % | TEMPERATURE: 98.5 F

## 2023-02-08 DIAGNOSIS — I25.10 CORONARY ARTERY DISEASE INVOLVING NATIVE CORONARY ARTERY OF NATIVE HEART WITHOUT ANGINA PECTORIS: ICD-10-CM

## 2023-02-08 DIAGNOSIS — Z95.5 STENTED CORONARY ARTERY: ICD-10-CM

## 2023-02-08 DIAGNOSIS — Z00.00 MEDICARE ANNUAL WELLNESS VISIT, SUBSEQUENT: ICD-10-CM

## 2023-02-08 DIAGNOSIS — C61 PROSTATE CANCER (HCC): ICD-10-CM

## 2023-02-08 DIAGNOSIS — I10 PRIMARY HYPERTENSION: Primary | ICD-10-CM

## 2023-02-08 DIAGNOSIS — F41.9 ANXIETY: ICD-10-CM

## 2023-02-08 PROCEDURE — 1101F PT FALLS ASSESS-DOCD LE1/YR: CPT | Performed by: INTERNAL MEDICINE

## 2023-02-08 PROCEDURE — 99214 OFFICE O/P EST MOD 30 MIN: CPT | Performed by: INTERNAL MEDICINE

## 2023-02-08 PROCEDURE — G8417 CALC BMI ABV UP PARAM F/U: HCPCS | Performed by: INTERNAL MEDICINE

## 2023-02-08 PROCEDURE — G0439 PPPS, SUBSEQ VISIT: HCPCS | Performed by: INTERNAL MEDICINE

## 2023-02-08 PROCEDURE — G8510 SCR DEP NEG, NO PLAN REQD: HCPCS | Performed by: INTERNAL MEDICINE

## 2023-02-08 PROCEDURE — G8427 DOCREV CUR MEDS BY ELIG CLIN: HCPCS | Performed by: INTERNAL MEDICINE

## 2023-02-08 PROCEDURE — G8536 NO DOC ELDER MAL SCRN: HCPCS | Performed by: INTERNAL MEDICINE

## 2023-02-08 RX ORDER — LORAZEPAM 0.5 MG/1
TABLET ORAL
Qty: 30 TABLET | Refills: 1 | Status: SHIPPED | OUTPATIENT
Start: 2023-02-08

## 2023-02-08 RX ORDER — SULFAMETHOXAZOLE AND TRIMETHOPRIM 400; 80 MG/1; MG/1
TABLET ORAL
COMMUNITY
Start: 2022-12-06

## 2023-02-08 NOTE — PROGRESS NOTES
Schedule of Personalized Health Plan  (Provide Copy to Patient)  The best way to stay healthy is to live a healthy lifestyle. A healthy lifestyle includes regular exercise, eating a well-balanced diet, keeping a healthy weight and not smoking. Regular physical exams and screening tests are another important way to take care of yourself. Preventive exams provided by health care providers can find health problems early when treatment works best and can keep you from getting certain diseases or illnesses. Preventive services include exams, lab tests, screenings, shots, monitoring and information to help you take care of your own health. All people over 65 should have a pneumonia shot. Pneumonia shots are usually only needed once in a lifetime unless your doctor decides differently. All people over 65 should have a yearly flu shot. People over 65 are at medium to high risk for Hepatitis B. Three shots are needed for complete protection. In addition to your physical exam, some screening tests are recommended:    Bone mass measurement (dexa scan) is recommended every two years if you have certain risk factors, such as personal history of vertebral fracture or chronic steroid medication use    Diabetes Mellitus screening is recommended every year. Glaucoma is an eye disease caused by high pressure in the eye. An eye exam is recommended every year. Cardiovascular screening tests that check your cholesterol and other blood fat (lipid) levels are recommended every five years. Colorectal Cancer screening tests help to find pre-cancerous polyps (growths in the colon) so they can be removed before they turn into cancer. Tests ordered for screening depend on your personal and family history risk factors.     Screening for Prostate Cancer is recommended yearly with a digital rectal exam and/or a PSA test    Here is a list of your current Health Maintenance items with a due date:  Health Maintenance Topic Date Due    Pneumococcal 65+ years (1 - PCV) Never done    Shingles Vaccine (1 of 2) Never done    COVID-19 Vaccine (3 - Booster for Pfizer series) 05/05/2021    Flu Vaccine (1) Never done    DTaP/Tdap/Td series (1 - Tdap) 11/25/2022    Depression Screen  12/09/2023    Medicare Yearly Exam  02/09/2024       This is the Subsequent Medicare Annual Wellness Exam, performed 12 months or more after the Initial AWV or the last Subsequent AWV    I have reviewed the patient's medical history in detail and updated the computerized patient record. Assessment/Plan   Education and counseling provided:  Are appropriate based on today's review and evaluation    1. Primary hypertension  2. Prostate cancer ([facility] [address]  3. Coronary artery disease involving native coronary artery of native heart without angina pectoris  4. Stented coronary artery  5. Anxiety       Depression Risk Factor Screening     3 most recent PHQ Screens 2/8/2023   Little interest or pleasure in doing things Not at all   Feeling down, depressed, irritable, or hopeless Not at all   Total Score PHQ 2 0   Trouble falling or staying asleep, or sleeping too much -   Feeling tired or having little energy -   Poor appetite, weight loss, or overeating -   Feeling bad about yourself - or that you are a failure or have let yourself or your family down -   Trouble concentrating on things such as school, work, reading, or watching TV -   Moving or speaking so slowly that other people could have noticed; or the opposite being so fidgety that others notice -   Thoughts of being better off dead, or hurting yourself in some way -   PHQ 9 Score -       Alcohol & Drug Abuse Risk Screen    Do you average more than 1 drink per night or more than 7 drinks a week: No    In the past three months have you have had more than 4 drinks containing alcohol on one occasion: No          Functional Ability and Level of Safety    Hearing: Hearing is good.       Activities of Daily Living: The home contains: no safety equipment. Patient does total self care      Ambulation: with no difficulty     Fall Risk:  Fall Risk Assessment, last 12 mths 2/8/2023   Able to walk? Yes   Fall in past 12 months? 1   Do you feel unsteady? 0   Are you worried about falling 0   Number of falls in past 12 months 1   Fall with injury?  0      Abuse Screen:  Patient is not abused       Cognitive Screening    Has your family/caregiver stated any concerns about your memory: no     Cognitive Screening: A+O x 3    Health Maintenance Due     Health Maintenance Due   Topic Date Due    Pneumococcal 65+ years (1 - PCV) Never done    Shingles Vaccine (1 of 2) Never done    COVID-19 Vaccine (3 - Booster for Pfizer series) 05/05/2021    Flu Vaccine (1) Never done    DTaP/Tdap/Td series (1 - Tdap) 11/25/2022       Patient Care Team   Patient Care Team:  Jesica Reid DO as PCP - General (Internal Medicine Physician)  Jesica Reid DO as PCP - [facility]  Harriett Murry MD as Surgeon (General Surgery)    History     Patient Active Problem List   Diagnosis Code    CAD (coronary artery disease) I25.10    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    Prostate cancer ([address] C61    Macular degeneration of both eyes, unspecified type H35.30    Stented coronary artery Z95.5    Anxiety F41.9    Renal mass N28.89    S/P TURP Z90.79    Continuous leakage of urine N39.45    H/O partial nephrectomy Z90.5    Rosacea L71.9    Primary hypertension I10    Rising PSA following treatment for malignant neoplasm of prostate R97.21    Respiratory failure with hypoxia ([address] J96.91     Past Medical History:   Diagnosis Date    Bladder cancer ([address]     CAD (coronary artery disease)     MI 2004    Cancer [facility])     prostate s/p xrt    Macular degeneration     MI (myocardial infarction) ([address] 01/01/2004      Past Surgical History:   Procedure Laterality Date    HX APPENDECTOMY  1955    HX CATARACT REMOVAL      bilateral    HX UROLOGICAL  6/24/15    RIGHT ROBOTIC PARTIAL NEPHRECTOMY  (LATEX ALLERGY), renal exploration, cystectomy x 3    [place] UNLISTED PROCEDURE CARDIAC SURGERY      2 Stents     Current Outpatient Medications   Medication Sig Dispense Refill    trimethoprim-sulfamethoxazole (BACTRIM, SEPTRA)  mg per tablet TAKE 1 TABLET BY MOUTH THREE TIMES A WEEK AS DIRECTED TAKE ON MON, WED, FRI      lisinopriL (PRINIVIL, ZESTRIL) 5 mg tablet Take 1 tablet by mouth once daily 90 Tablet 0    LORazepam (ATIVAN) 0.5 mg tablet 1 daily prn anxiety 30 Tablet 0    predniSONE (DELTASONE) 5 mg tablet TAKE 1 TABLET BY MOUTH ONCE DAILY WITH FOOD      abiraterone 250 mg tab TAKE 1 TABLET BY MOUTH ONCE DAILY WITH A LOW FAT BREAKFAST      vit A/vit C/vit E/zinc/copper (PRESERVISION AREDS PO) 2 Tabs. Aspirin, Buffered 81 mg tab Take  by mouth.      bisacodyL (DULCOLAX) 5 mg EC tablet Take 5 mg by mouth daily. Indications: CONSTIPATION      saw palmetto 160 mg cap Take 1 Cap by mouth two (2) times a day. multivitamin (ONE A DAY) tablet Take 1 Tab by mouth daily. Allergies   Allergen Reactions    Latex Other (comments)     Blistering of the skin       Family History   Problem Relation Age of Onset    Alzheimer's Disease Mother     Cancer Father         colon    No Known Problems Sister     No Known Problems Sister     No Known Problems Sister      Social History     Tobacco Use    Smoking status: Every Day     Packs/day: 0.50     Years: 40.00     Pack years: 20.00     Types: Cigarettes    Smokeless tobacco: Never    Tobacco comments:     1 pack every 4 days   Substance Use Topics    Alcohol use: No     Alcohol/week: 0.0 standard drinks         Elenita Mart DO This is the Subsequent Medicare Annual Wellness Exam, performed 12 months or more after the Initial AWV or the last Subsequent AWV    I have reviewed the patient's medical history in detail and updated the computerized patient record.        Assessment/Plan   Education and counseling provided:  Are appropriate based on today's review and evaluation    1. Primary hypertension  2. Prostate cancer ([facility] [address]  3. Coronary artery disease involving native coronary artery of native heart without angina pectoris  4. Stented coronary artery  5. Anxiety  -     LORazepam (ATIVAN) 0.5 mg tablet; 1 daily prn anxiety, Normal, Disp-30 Tablet, R-1       Depression Risk Factor Screening     3 most recent PHQ Screens 2/8/2023   Little interest or pleasure in doing things Not at all   Feeling down, depressed, irritable, or hopeless Not at all   Total Score PHQ 2 0   Trouble falling or staying asleep, or sleeping too much -   Feeling tired or having little energy -   Poor appetite, weight loss, or overeating -   Feeling bad about yourself - or that you are a failure or have let yourself or your family down -   Trouble concentrating on things such as school, work, reading, or watching TV -   Moving or speaking so slowly that other people could have noticed; or the opposite being so fidgety that others notice -   Thoughts of being better off dead, or hurting yourself in some way -   PHQ 9 Score -       Alcohol & Drug Abuse Risk Screen    Do you average more than 1 drink per night or more than 7 drinks a week: No    In the past three months have you have had more than 4 drinks containing alcohol on one occasion: No          Functional Ability and Level of Safety    Hearing: Hearing is good. Activities of Daily Living: The home contains: no safety equipment. Patient does total self care      Ambulation: with no difficulty     Fall Risk:  Fall Risk Assessment, last 12 mths 2/8/2023   Able to walk? Yes   Fall in past 12 months? 1   Do you feel unsteady? 0   Are you worried about falling 0   Number of falls in past 12 months 1   Fall with injury?  0      Abuse Screen:  Patient is not abused       Cognitive Screening    Has your family/caregiver stated any concerns about your memory: no     Cognitive Screening: A+O x 3    Health Maintenance Due     Health Maintenance Due   Topic Date Due    Pneumococcal 65+ years (1 - PCV) Never done    Shingles Vaccine (1 of 2) Never done    COVID-19 Vaccine (3 - Booster for Pfizer series) 05/05/2021    Flu Vaccine (1) Never done    DTaP/Tdap/Td series (1 - Tdap) 11/25/2022       Patient Care Team   Patient Care Team:  Hernando Cheema DO as PCP - General (Internal Medicine Physician)  Hernando Cheema DO as PCP - [facility] Provider  Cash Diamond MD as Surgeon (General Surgery)    History     Patient Active Problem List   Diagnosis Code    CAD (coronary artery disease) I25.10    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)     prostate s/p xrt    Macular degeneration     MI (myocardial infarction) ([address] 01/01/2004      Past Surgical History:   Procedure Laterality Date    HX APPENDECTOMY  1955    HX CATARACT REMOVAL      bilateral    HX UROLOGICAL  6/24/15    RIGHT ROBOTIC PARTIAL NEPHRECTOMY  (LATEX ALLERGY), renal exploration, cystectomy x 3    [place] UNLISTED PROCEDURE CARDIAC SURGERY      2 Stents     Current Outpatient Medications   Medication Sig Dispense Refill    trimethoprim-sulfamethoxazole (BACTRIM, SEPTRA)  mg per tablet TAKE 1 TABLET BY MOUTH THREE TIMES A WEEK AS DIRECTED TAKE ON MON, WED, FRI      LORazepam (ATIVAN) 0.5 mg tablet 1 daily prn anxiety 30 Tablet 1    lisinopriL (PRINIVIL, ZESTRIL) 5 mg tablet Take 1 tablet by mouth once daily 90 Tablet 0    predniSONE (DELTASONE) 5 mg tablet TAKE 1 TABLET BY MOUTH ONCE DAILY WITH FOOD      abiraterone 250 mg tab TAKE 1 TABLET BY MOUTH ONCE DAILY WITH A LOW FAT BREAKFAST      vit A/vit C/vit E/zinc/copper (PRESERVISION AREDS PO) 2 Tabs. Aspirin, Buffered 81 mg tab Take  by mouth.      bisacodyL (DULCOLAX) 5 mg EC tablet Take 5 mg by mouth daily. Indications: CONSTIPATION      saw palmetto 160 mg cap Take 1 Cap by mouth two (2) times a day. multivitamin (ONE A DAY) tablet Take 1 Tab by mouth daily.        Allergies   Allergen Reactions    Latex Other (comments)     Blistering of the skin       Family History   Problem Relation Age of Onset    Alzheimer's Disease Mother     Cancer Father         colon    No Known Problems Sister     No Known Problems Sister     No Known Problems Sister      Social History     Tobacco Use    Smoking status: Every Day     Packs/day: 0.50     Years: 40.00     Pack years: 20.00     Types: Cigarettes    Smokeless tobacco: Never    Tobacco comments:     1 pack every 4 days   Substance Use Topics    Alcohol use: No     Alcohol/week: 0.0 standard drinks         Abdon Kim DO

## 2023-02-08 NOTE — PROGRESS NOTES
Valentín Contreras is a 81 y.o. male. Pt. comes in for f/u. Has a few chronic medical issues as documented. Reports injuring tip of right thumb with a danilo thorn about 2 months ago. Has occasional bleeding. Wonders if there is a splinter in there. Followed by urologist for bladder and prostate cancer. PSA has come down to around 10 on medications. Denies any urinary symptoms. Cardiac status has been stable. Has a stent. Denies chest pain. Remains on lisinopril. Followed by cardiology. Hospitalized about 3 months ago with respiratory failure. I reviewed records in the EMR. Has quit smoking. Followed by pulmonologist.  Not using inhalers anymore. Denies any acute respiratory issues. All other chronic medical issues are stable on current treatment regimen. Continues to have occasional anxiety. Takes lorazepam as needed. Denies any issues with  Covid-19 vaccination. PMH/PSH/Allergies/Social History/medication list and most recent studies reviewed with patient. Tobacco use: No  Alcohol use: Social  Reports compliance with medications and diet. Trying to be active physically to control weight. Reports no other new c/o.     Past Medical History:   Diagnosis Date    Bladder cancer ([facility] [address]     CAD (coronary artery disease)     MI 2004    Cancer [facility])     prostate s/p xrt    Macular degeneration     [facility] 01/01/2004       Allergies   Allergen Reactions    Latex Other (comments)     Blistering of the skin       Current Outpatient Medications on File Prior to Visit   Medication Sig Dispense Refill    trimethoprim-sulfamethoxazole (BACTRIM, SEPTRA)  mg per tablet TAKE 1 TABLET BY MOUTH THREE TIMES A WEEK AS DIRECTED TAKE ON MON, WED, FRI      lisinopriL (PRINIVIL, ZESTRIL) 5 mg tablet Take 1 tablet by mouth once daily 90 Tablet 0    predniSONE (DELTASONE) 5 mg tablet TAKE 1 TABLET BY MOUTH ONCE DAILY WITH FOOD      abiraterone 250 mg tab TAKE 1 TABLET BY MOUTH ONCE DAILY WITH A LOW FAT BREAKFAST      vit A/vit C/vit E/zinc/copper (PRESERVISION AREDS PO) 2 Tabs. Aspirin, Buffered 81 mg tab Take  by mouth.      bisacodyL (DULCOLAX) 5 mg EC tablet Take 5 mg by mouth daily. Indications: CONSTIPATION      saw palmetto 160 mg cap Take 1 Cap by mouth two (2) times a day. multivitamin (ONE A DAY) tablet Take 1 Tab by mouth daily. [DISCONTINUED] LORazepam (ATIVAN) 0.5 mg tablet 1 daily prn anxiety 30 Tablet 0     No current facility-administered medications on file prior to visit. Visit Vitals  Blood Pressure 110/60 (BP 1 Location: Left upper arm, BP Patient Position: Sitting, BP Cuff Size: Adult)   Pulse 90   Temperature 98.5 °F (36.9 °C) (Oral)   Respiration 12   Height 5' 9\" (1.753 m)   Weight 170 lb 3.2 oz (77.2 kg)   Oxygen Saturation 96%   Body Mass Index 25.13 kg/m²             HPI  Review of Systems   Constitutional: Negative. HENT: Negative. Eyes: Negative. Respiratory:  Negative for shortness of breath. Cardiovascular:  Negative for chest pain and leg swelling. Gastrointestinal:  Negative for abdominal pain. Genitourinary:  Positive for urgency (Continues incontinence. Wears diapers. ). Negative for dysuria, flank pain, frequency and hematuria. Musculoskeletal:  Negative for joint pain. Skin: Negative. Neurological:  Negative for dizziness, sensory change, focal weakness and headaches. Endo/Heme/Allergies: Negative. Psychiatric/Behavioral:  Negative for depression. The patient is nervous/anxious. All other systems reviewed and are negative. Objective  Physical Exam  Vitals and nursing note reviewed. Constitutional:       General: He is not in acute distress. Appearance: He is well-developed. Comments: pleasant   HENT:      Head: Normocephalic and atraumatic. Mouth/Throat:      Mouth: Mucous membranes are moist.   Eyes:      General: No scleral icterus.      Conjunctiva/sclera: Conjunctivae normal.   Neck:      Thyroid: No thyromegaly. Vascular: No carotid bruit or JVD. Cardiovascular:      Rate and Rhythm: Normal rate and regular rhythm. Pulses: Normal pulses. Heart sounds: Normal heart sounds. No murmur heard. Pulmonary:      Effort: Pulmonary effort is normal. No respiratory distress. Breath sounds: Normal breath sounds. No wheezing or rales. Abdominal:      General: Bowel sounds are normal. There is no distension. Palpations: Abdomen is soft. Tenderness: There is no abdominal tenderness. There is no right CVA tenderness or left CVA tenderness. Musculoskeletal:         General: No tenderness. Cervical back: Normal range of motion and neck supple. Right lower leg: No edema. Left lower leg: No edema. Skin:     General: Skin is warm and dry. Findings: Erythema (nose and face c/w rosacea) present. No rash. Comments:   ? Small splinter scar on tip of R thumb. Non-tender. no discharge/bleeding. Neurological:      Mental Status: He is alert and oriented to person, place, and time. Coordination: Coordination normal.      Gait: Gait normal.   Psychiatric:         Behavior: Behavior normal.      Comments: Seems distressed/anxious        Assessment & Plan    ICD-10-CM ICD-9-CM    1. Primary hypertension  I10 401.9 Monitor BP at home with goal of 140/90 or less   Stable chronic condition. Continue current treatment/medications. 2. Prostate cancer ([facility] [address]  C61 185 Stable chronic condition. Continue current treatment/medications. Followed by urologist      3. Coronary artery disease involving native coronary artery of native heart without angina pectoris  I25.10 414.01 Stable chronic condition. Continue current treatment/medications. Followed by cardiologist      4. Stented coronary artery  Z95.5 V45.82       5. Anxiety  F41.9 300.00 LORazepam (ATIVAN) 0.5 mg tablet      6.  Medicare annual wellness visit, subsequent  Z00.00 V70.0 Orders Placed This Encounter    LORazepam (ATIVAN) 0.5 mg tablet     Si daily prn anxiety     Dispense:  30 Tablet     Refill:  1     Follow-up and Dispositions    Return in about 6 months (around 2023). All chronic medical problems are stable  Continue with current medical management and plan  lab results and schedule of future lab studies reviewed with patient  reviewed diet, exercise and weight control  reviewed medications and side effects in detail  F/u with other MD's/ providers as scheduled  COVID-19 precautions discussed with pt  An After Visit Summary was printed and given to the patient.     Maureen Madrigal DO

## 2023-02-08 NOTE — PROGRESS NOTES
ADVISED PATIENT OF THE FOLLOWING HEALTH MAINTAINCE DUE  Health Maintenance Due   Topic Date Due    Pneumococcal 65+ years (1 - PCV) Never done    Shingles Vaccine (1 of 2) Never done    COVID-19 Vaccine (3 - Booster for Pfizer series) 05/05/2021    Medicare Yearly Exam  07/17/2022    Flu Vaccine (1) Never done    DTaP/Tdap/Td series (1 - Tdap) 11/25/2022      Chief Complaint   Patient presents with    Foreign Body Removal     Pt has a thorn in his right index finger and it bleeds profusely. Physical    Hypertension       1. \"Have you been to the ER, urgent care clinic since your last visit? Hospitalized since your last visit? \"  11/23/22    2. \"Have you seen or consulted any other health care providers outside of the [address] [place] [address] since your last visit? \" No     3. For patients aged 39-70: Has the patient had a colonoscopy / FIT/ Cologuard? Yes - Care Gap present. Most recent result on file      If the patient is female:    4. For patients aged 41-77: Has the patient had a mammogram within the past 2 years? NA - based on age or sex      11. For patients aged 21-65: Has the patient had a pap smear?  NA - based on age or sex

## 2023-05-12 DIAGNOSIS — I10 PRIMARY HYPERTENSION: Primary | ICD-10-CM

## 2023-05-15 RX ORDER — BACITRACIN 500 UNIT/G
1 OINTMENT (GRAM) TOPICAL 2 TIMES DAILY
COMMUNITY

## 2023-05-15 RX ORDER — LISINOPRIL 5 MG/1
5 TABLET ORAL DAILY
Qty: 90 TABLET | Refills: 1 | Status: SHIPPED | OUTPATIENT
Start: 2023-05-15

## 2023-05-15 RX ORDER — LORAZEPAM 0.5 MG/1
TABLET ORAL
COMMUNITY
Start: 2023-02-08

## 2023-05-15 RX ORDER — ABIRATERONE ACETATE 250 MG/1
TABLET ORAL
COMMUNITY
Start: 2021-07-02

## 2023-05-15 RX ORDER — BISACODYL 5 MG/1
5 TABLET, DELAYED RELEASE ORAL DAILY
COMMUNITY

## 2023-05-15 RX ORDER — ABIRATERONE ACETATE 250 MG/1
TABLET ORAL
COMMUNITY
Start: 2023-04-10

## 2023-05-15 RX ORDER — LISINOPRIL 5 MG/1
5 TABLET ORAL DAILY
COMMUNITY
Start: 2023-02-14 | End: 2023-05-15 | Stop reason: SDUPTHER

## 2023-05-15 RX ORDER — ASPIRIN 81 MG/1
TABLET ORAL
COMMUNITY

## 2023-06-23 ENCOUNTER — APPOINTMENT (OUTPATIENT)
Facility: HOSPITAL | Age: 83
End: 2023-06-23
Payer: MEDICARE

## 2023-06-23 PROCEDURE — 70450 CT HEAD/BRAIN W/O DYE: CPT

## 2023-06-23 PROCEDURE — 99284 EMERGENCY DEPT VISIT MOD MDM: CPT

## 2023-06-23 PROCEDURE — 72125 CT NECK SPINE W/O DYE: CPT

## 2023-06-23 ASSESSMENT — PAIN - FUNCTIONAL ASSESSMENT: PAIN_FUNCTIONAL_ASSESSMENT: NONE - DENIES PAIN

## 2023-06-24 ENCOUNTER — HOSPITAL ENCOUNTER (EMERGENCY)
Facility: HOSPITAL | Age: 83
Discharge: HOME | End: 2023-06-24
Attending: STUDENT IN AN ORGANIZED HEALTH CARE EDUCATION/TRAINING PROGRAM
Payer: MEDICARE

## 2023-06-24 VITALS
OXYGEN SATURATION: 96 % | HEART RATE: 84 BPM | DIASTOLIC BLOOD PRESSURE: 70 MMHG | TEMPERATURE: 98.1 F | SYSTOLIC BLOOD PRESSURE: 111 MMHG | RESPIRATION RATE: 16 BRPM

## 2023-06-24 DIAGNOSIS — S06.0X0A CONCUSSION WITHOUT LOSS OF CONSCIOUSNESS, INITIAL ENCOUNTER: ICD-10-CM

## 2023-06-24 DIAGNOSIS — S09.90XA CLOSED HEAD INJURY, INITIAL ENCOUNTER: Primary | ICD-10-CM

## 2023-06-24 RX ORDER — ONDANSETRON 4 MG/1
4 TABLET, ORALLY DISINTEGRATING ORAL 3 TIMES DAILY PRN
Qty: 21 TABLET | Refills: 0 | Status: SHIPPED | OUTPATIENT
Start: 2023-06-24 | End: 2023-06-30

## 2023-06-24 ASSESSMENT — PAIN SCALES - GENERAL: PAINLEVEL_OUTOF10: 0

## 2023-06-24 NOTE — ED PROVIDER NOTES
relevant contributory comorbidities managed:  ***  Social Determinants of Health addressed:  ***  FINAL ASSESSMENT AND DISPOSITION     ED DIAGNOSIS  1.  Closed head injury, initial encounter        DISPOSITION  ***    Labs/Imaging Studies Ordered/Performed in ED  Orders Placed This Encounter   Procedures    CT HEAD WO CONTRAST    CT CERVICAL SPINE WO CONTRAST       Electronically signed by

## 2023-06-24 NOTE — ED TRIAGE NOTES
Pt reports missing a step in his home and falling backwards on the stairs hitting his head. Denies LOC. Pt reports falling last Sunday and also hitting his head. Pt currently takes ASA. Denies pain.

## 2023-06-29 ENCOUNTER — NURSE TRIAGE (OUTPATIENT)
Dept: OTHER | Facility: CLINIC | Age: 83
End: 2023-06-29

## 2023-06-30 ENCOUNTER — HOSPITAL ENCOUNTER (OUTPATIENT)
Facility: HOSPITAL | Age: 83
End: 2023-06-30
Payer: MEDICARE

## 2023-06-30 ENCOUNTER — OFFICE VISIT (OUTPATIENT)
Age: 83
End: 2023-06-30
Payer: MEDICARE

## 2023-06-30 VITALS
BODY MASS INDEX: 23.85 KG/M2 | RESPIRATION RATE: 12 BRPM | HEART RATE: 100 BPM | OXYGEN SATURATION: 96 % | DIASTOLIC BLOOD PRESSURE: 60 MMHG | TEMPERATURE: 98 F | SYSTOLIC BLOOD PRESSURE: 110 MMHG | HEIGHT: 69 IN | WEIGHT: 161 LBS
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
## 2023-06-30 DIAGNOSIS — R09.02 OXYGEN DECREASE: Primary | ICD-10-CM

## 2023-06-30 DIAGNOSIS — Z91.81 AT HIGH RISK FOR FALLS: ICD-10-CM

## 2023-06-30 DIAGNOSIS — I10 ESSENTIAL (PRIMARY) HYPERTENSION: ICD-10-CM

## 2023-06-30 DIAGNOSIS — R09.02 OXYGEN DECREASE: ICD-10-CM

## 2023-06-30 LAB
BASOPHILS # BLD AUTO: 0.1 X10E3/UL (ref 0–0.2)
BASOPHILS NFR BLD AUTO: 0 %
EOSINOPHIL # BLD AUTO: 0.4 X10E3/UL (ref 0–0.4)
EOSINOPHIL NFR BLD AUTO: 3 %
ERYTHROCYTE [DISTWIDTH] IN BLOOD BY AUTOMATED COUNT: 12.4 % (ref 11.6–15.4)
HCT VFR BLD AUTO: 37.5 % (ref 37.5–51)
HGB BLD-MCNC: 12.1 G/DL (ref 13–17.7)
IMM GRANULOCYTES # BLD AUTO: 0 X10E3/UL (ref 0–0.1)
IMM GRANULOCYTES NFR BLD AUTO: 0 %
LYMPHOCYTES # BLD AUTO: 1.3 X10E3/UL (ref 0.7–3.1)
LYMPHOCYTES NFR BLD AUTO: 11 %
MCH RBC QN AUTO: 30.2 PG (ref 26.6–33)
MCHC RBC AUTO-ENTMCNC: 32.3 G/DL (ref 31.5–35.7)
MCV RBC AUTO: 94 FL (ref 79–97)
MONOCYTES # BLD AUTO: 0.6 X10E3/UL (ref 0.1–0.9)
MONOCYTES NFR BLD AUTO: 5 %
NEUTROPHILS # BLD AUTO: 9.1 X10E3/UL (ref 1.4–7)
NEUTROPHILS NFR BLD AUTO: 81 %
PLATELET # BLD AUTO: 468 X10E3/UL (ref 150–450)
RBC # BLD AUTO: 4.01 X10E6/UL (ref 4.14–5.8)
WBC # BLD AUTO: 11.6 X10E3/UL (ref 3.4–10.8)

## 2023-06-30 PROCEDURE — 99213 OFFICE O/P EST LOW 20 MIN: CPT | Performed by: INTERNAL MEDICINE

## 2023-06-30 PROCEDURE — 71046 X-RAY EXAM CHEST 2 VIEWS: CPT

## 2023-06-30 RX ORDER — SULFAMETHOXAZOLE AND TRIMETHOPRIM 400; 80 MG/1; MG/1
TABLET ORAL
COMMUNITY
Start: 2023-05-12

## 2023-06-30 SDOH — ECONOMIC STABILITY: FOOD INSECURITY: WITHIN THE PAST 12 MONTHS, YOU WORRIED THAT YOUR FOOD WOULD RUN OUT BEFORE YOU GOT MONEY TO BUY MORE.: NEVER TRUE

## 2023-06-30 SDOH — ECONOMIC STABILITY: FOOD INSECURITY: WITHIN THE PAST 12 MONTHS, THE FOOD YOU BOUGHT JUST DIDN'T LAST AND YOU DIDN'T HAVE MONEY TO GET MORE.: NEVER TRUE

## 2023-06-30 SDOH — ECONOMIC STABILITY: INCOME INSECURITY: HOW HARD IS IT FOR YOU TO PAY FOR THE VERY BASICS LIKE FOOD, HOUSING, MEDICAL CARE, AND HEATING?: NOT HARD AT ALL

## 2023-06-30 SDOH — ECONOMIC STABILITY: HOUSING INSECURITY
IN THE LAST 12 MONTHS, WAS THERE A TIME WHEN YOU DID NOT HAVE A STEADY PLACE TO SLEEP OR SLEPT IN A SHELTER (INCLUDING NOW)?: NO

## 2023-06-30 ASSESSMENT — PATIENT HEALTH QUESTIONNAIRE - PHQ9
SUM OF ALL RESPONSES TO PHQ9 QUESTIONS 1 & 2: 0
SUM OF ALL RESPONSES TO PHQ QUESTIONS 1-9: 0
SUM OF ALL RESPONSES TO PHQ QUESTIONS 1-9: 0
2. FEELING DOWN, DEPRESSED OR HOPELESS: 0
SUM OF ALL RESPONSES TO PHQ QUESTIONS 1-9: 0
1. LITTLE INTEREST OR PLEASURE IN DOING THINGS: 0
SUM OF ALL RESPONSES TO PHQ QUESTIONS 1-9: 0

## 2023-06-30 ASSESSMENT — ENCOUNTER SYMPTOMS
RESPIRATORY NEGATIVE: 1
GASTROINTESTINAL NEGATIVE: 1

## 2023-06-30 ASSESSMENT — ANXIETY QUESTIONNAIRES
2. NOT BEING ABLE TO STOP OR CONTROL WORRYING: 0
3. WORRYING TOO MUCH ABOUT DIFFERENT THINGS: 0
6. BECOMING EASILY ANNOYED OR IRRITABLE: 0
5. BEING SO RESTLESS THAT IT IS HARD TO SIT STILL: 0
7. FEELING AFRAID AS IF SOMETHING AWFUL MIGHT HAPPEN: 0
GAD7 TOTAL SCORE: 0
IF YOU CHECKED OFF ANY PROBLEMS ON THIS QUESTIONNAIRE, HOW DIFFICULT HAVE THESE PROBLEMS MADE IT FOR YOU TO DO YOUR WORK, TAKE CARE OF THINGS AT HOME, OR GET ALONG WITH OTHER PEOPLE: NOT DIFFICULT AT ALL
1. FEELING NERVOUS, ANXIOUS, OR ON EDGE: 0
4. TROUBLE RELAXING: 0

## 2023-07-01 LAB
ALBUMIN SERPL-MCNC: 3.7 G/DL (ref 3.6–4.6)
ALBUMIN/GLOB SERPL: 1.2 {RATIO} (ref 1.2–2.2)
ALP SERPL-CCNC: 124 IU/L (ref 44–121)
ALT SERPL-CCNC: 19 IU/L (ref 0–44)
AST SERPL-CCNC: 17 IU/L (ref 0–40)
BILIRUB SERPL-MCNC: 0.2 MG/DL (ref 0–1.2)
BUN SERPL-MCNC: 38 MG/DL (ref 8–27)
BUN/CREAT SERPL: 20 (ref 10–24)
CALCIUM SERPL-MCNC: 10.9 MG/DL (ref 8.6–10.2)
CHLORIDE SERPL-SCNC: 107 MMOL/L (ref 96–106)
CO2 SERPL-SCNC: 18 MMOL/L (ref 20–29)
CREAT SERPL-MCNC: 1.86 MG/DL (ref 0.76–1.27)
EGFRCR SERPLBLD CKD-EPI 2021: 36 ML/MIN/1.73
GLOBULIN SER CALC-MCNC: 3 G/DL (ref 1.5–4.5)
GLUCOSE SERPL-MCNC: 94 MG/DL (ref 70–99)
POTASSIUM SERPL-SCNC: 5.5 MMOL/L (ref 3.5–5.2)
PROT SERPL-MCNC: 6.7 G/DL (ref 6–8.5)
REPORT: NORMAL
SODIUM SERPL-SCNC: 140 MMOL/L (ref 134–144)

## 2023-07-03 ENCOUNTER — HOME HEALTH ADMISSION (OUTPATIENT)
Dept: HOME HEALTH SERVICES | Facility: HOME HEALTH | Age: 83
End: 2023-07-03
Payer: MEDICARE

## 2023-07-04 ENCOUNTER — HOME CARE VISIT (OUTPATIENT)
Facility: HOME HEALTH | Age: 83
End: 2023-07-04

## 2023-07-04 PROCEDURE — 0221000100 HH NO PAY CLAIM PROCEDURE

## 2023-07-04 PROCEDURE — G0299 HHS/HOSPICE OF RN EA 15 MIN: HCPCS

## 2023-07-06 ENCOUNTER — HOME CARE VISIT (OUTPATIENT)
Facility: HOME HEALTH | Age: 83
End: 2023-07-06

## 2023-07-06 VITALS
HEART RATE: 80 BPM | SYSTOLIC BLOOD PRESSURE: 104 MMHG | TEMPERATURE: 97.6 F | RESPIRATION RATE: 17 BRPM | OXYGEN SATURATION: 93 % | DIASTOLIC BLOOD PRESSURE: 62 MMHG

## 2023-07-06 VITALS
HEART RATE: 92 BPM | SYSTOLIC BLOOD PRESSURE: 106 MMHG | TEMPERATURE: 98.1 F | RESPIRATION RATE: 16 BRPM | DIASTOLIC BLOOD PRESSURE: 60 MMHG | OXYGEN SATURATION: 98 %

## 2023-07-06 PROCEDURE — G0300 HHS/HOSPICE OF LPN EA 15 MIN: HCPCS

## 2023-07-06 PROCEDURE — G0152 HHCP-SERV OF OT,EA 15 MIN: HCPCS

## 2023-07-06 PROCEDURE — G0151 HHCP-SERV OF PT,EA 15 MIN: HCPCS

## 2023-07-06 ASSESSMENT — ENCOUNTER SYMPTOMS: HEMOPTYSIS: 0

## 2023-07-06 NOTE — HOME HEALTH
Subjective: Patient is status post recent falls. Per patient's daughter, patient has a history of prostate cancer and family is not sure how far the cancer is progressed because patient will not let his family attend MD visits with him. Patient lives in tri level home with his family. Patient's bedroom is on the top level of his home. Patient's PLOF was independent for mobility and ADLs. Patient's CLOF is min assist for mobility using cane x 25 feet and min assist for ADLs. Patient is having difficulty ambulating, transferring and performing ADLs. Patient will benefit from continued PT in order to increase strength, improve balance , prevent falls and acheive listed goals  Falls since last visit - no  Caregiver involvement changes: no  Home health supplies by type and quantity ordered/delivered this visit include: no    Clinician asked if patient has had any physician contact since last home care visit and patient states: no  Clinician asked if patient has any new or changed medications and patient states: no  If Yes, were medications reconciled? yes  Was the certifying physician notified of changes in medications? na    Clinical assessment (what this visit means for the patient overall and need for ongoing skilled care) and progress or lack of progress towards SPECIFIC goals: Patient's PLOF was independent for mobility and ADLs. Patient's CLOF is min assist for mobility using cane x 25 feet and min assist for ADLs. Patient is having difficulty ambulating, transferring and performing ADLs.  Patient will benefit from continued PT in order to increase strength, improve balance , prevent falls and acheive listed goals      Written Teaching Material Utilized: written copy of exercise given to patient     Interdisciplinary communication with: written copy of exercises given to patient     Discharge planning as follows: Patient will be discharged to care of family under supervision of MD when PT goals are met     Specific

## 2023-07-07 ENCOUNTER — TELEPHONE (OUTPATIENT)
Age: 83
End: 2023-07-07

## 2023-07-07 VITALS
DIASTOLIC BLOOD PRESSURE: 70 MMHG | TEMPERATURE: 97 F | RESPIRATION RATE: 16 BRPM | OXYGEN SATURATION: 96 % | SYSTOLIC BLOOD PRESSURE: 106 MMHG | HEART RATE: 97 BPM

## 2023-07-07 DIAGNOSIS — Z90.5 ACQUIRED ABSENCE OF KIDNEY: ICD-10-CM

## 2023-07-07 DIAGNOSIS — I10 ESSENTIAL (PRIMARY) HYPERTENSION: Primary | ICD-10-CM

## 2023-07-07 DIAGNOSIS — I25.10 ATHEROSCLEROSIS OF NATIVE CORONARY ARTERY WITHOUT ANGINA PECTORIS, UNSPECIFIED WHETHER NATIVE OR TRANSPLANTED HEART: ICD-10-CM

## 2023-07-07 DIAGNOSIS — I10 ESSENTIAL (PRIMARY) HYPERTENSION: ICD-10-CM

## 2023-07-07 ASSESSMENT — ENCOUNTER SYMPTOMS: CONSTIPATION: 1

## 2023-07-07 NOTE — TELEPHONE ENCOUNTER
Call placed to pt and spoke with pt, discussed CXR Pt does not want  home tyler.  He will call to make appt with pulmonology, he does not need referral

## 2023-07-07 NOTE — TELEPHONE ENCOUNTER
Called and spoke with pt, discussed lab results and recommendations with increase water and to repeat labs.  He will have these done

## 2023-07-07 NOTE — TELEPHONE ENCOUNTER
----- Message from [facility], MADELAINE - NP sent at 7/3/2023  8:14 AM EDT -----  Patient chest xray shows that he likely has some emphysema. This can be the cause of the low oxygen at times.  Please get PT to assess home o2 on evaluation and see a pulmonologist

## 2023-07-07 NOTE — HOME HEALTH
Skilled reason for admission/summary of clinical condition:Pt is a 80 y/o male who has had falls at home and hit head on a recent fall with no L HTn [place]. Pt is   Diagnosis: Falls  Subjective:thank you for coming on a holiday  Caregiver:  Agustina Avalos- son lives across street from patient and is present and active with all care needs. Medications reconciled and all medications are available in the home this visit. The following education was provided regarding medications: medication interactions and look alike medications: Patient/CG able to demonstrate knowledge through teach back with 95 percent accuracy. Medications are effective at this time. notified of any discrepancies/medication interactions n/a  A list of reconciled medications has been a copy has been uploaded to media. Home health supplies by type and quantity ordered/delivered this visit include: none  Patient/caregiver instructed on plan of care and are agreeable to plan of care at this time. Clinician reviewed orientation to home health booklet with patient/caregiver including agency phone number, agency complaint process, state hotline number, as well as joint [facility]'s quality hotline number. Consent forms signed. Patient at risk for falls Yes:   Recommended requesting PT/OT orders due to fall risk YES:   Patient response to recommended requesting of PT/OT orders: ordered    Discharge planning discussed with patient and caregiver. Discharge planning as follows: When goals of disease process education completed and therapy goals met  Patient/caregiver did verbalize agreement with discharge planning. Clinical Assessmentper documentation  Written Teaching Material Utilized:[facility]charge instructions and [facility]    Specific plan for next visit: VS, Medication managment  Plan of care and admission to home health status called to attending physician.  The following practitioner has agreed to sign the ongoing POC [facility]

## 2023-07-07 NOTE — HOME HEALTH
Pt is a 80 y/o male who has had multiplefalls at home and hit head on a recent fall. PMH: [place]. Pt lives in a multi level home with family. Bedroom and bathroom are on the 2nd level. pt uses a cane occassionally. Diagnosis: Falls    Subjective: Pt and daughter had a verbal argument about how many falls pt has had recently. Caregiver:  Spencer Contreras- son lives across [place], Sunil Veronique and wife are in the home. Wife has her oen health issues. CAregivers assist with all parts of care. Medications reconciled and all medications are available in the home this visit. The following education was provided regarding medications: medication interactions and look alike medications: Patient/CG able to demonstrate knowledge through teach back with 95 percent accuracy. Medications are effective at this time. Home health supplies by type and quantity ordered/delivered this visit include: none    Patient/caregiver instructed on plan of care and are agreeable to plan of care at this time. Patient at risk for falls Yes:   Recommended requesting PT/OT orders due to fall risk YES:   Patient response to recommended requesting of PT/OT orders: ordered    Discharge planning discussed with patient and caregiver. Discharge planning as follows: When goals of disease process education completed and therapy goals met    Patient/caregiver did verbalize agreement with discharge planning. Clinical Assessment: Irritated through eval. denies needing assistance with care despite caregivers stating he does and pt has balance impairments putting him at risk for additional falls. previously mod I.  patient presents at high risk of falls (MAHC-10 = 7), and mod A for safe ADL completion (Modified Barthel Index = 55/100) during OT Eval. He is currently unsafe with ambulating around the home due to not wanting to use AD and with functional transfers due to needing 3:1 commode or versa frame aroudn toilet.  Also instructed on

## 2023-07-08 ENCOUNTER — HOSPITAL ENCOUNTER (INPATIENT)
Facility: HOSPITAL | Age: 83
LOS: 2 days | Discharge: HOME HEALTH | DRG: 315 | End: 2023-07-10
Attending: EMERGENCY MEDICINE | Admitting: FAMILY MEDICINE
Payer: MEDICARE

## 2023-07-08 ENCOUNTER — APPOINTMENT (OUTPATIENT)
Facility: HOSPITAL | Age: 83
DRG: 315 | End: 2023-07-08
Payer: MEDICARE

## 2023-07-08 DIAGNOSIS — I95.9 HYPOTENSION, UNSPECIFIED HYPOTENSION TYPE: Primary | ICD-10-CM

## 2023-07-08 PROBLEM — R53.1 GENERALIZED WEAKNESS: Status: ACTIVE | Noted: 2023-07-08

## 2023-07-08 PROBLEM — E87.21 ACUTE LACTIC ACIDOSIS: Status: ACTIVE | Noted: 2023-07-08

## 2023-07-08 LAB
ALBUMIN SERPL-MCNC: 2.7 G/DL (ref 3.5–5)
ALBUMIN/GLOB SERPL: 0.6 (ref 1.1–2.2)
ALP SERPL-CCNC: 119 U/L (ref 45–117)
ALT SERPL-CCNC: 549 U/L (ref 12–78)
ANION GAP SERPL CALC-SCNC: 12 MMOL/L (ref 5–15)
AST SERPL-CCNC: 249 U/L (ref 15–37)
BASOPHILS # BLD: 0.1 K/UL (ref 0–0.1)
BASOPHILS NFR BLD: 1 % (ref 0–1)
BILIRUB SERPL-MCNC: 0.3 MG/DL (ref 0.2–1)
BUN SERPL-MCNC: 37 MG/DL (ref 6–20)
BUN/CREAT SERPL: 18 (ref 12–20)
CALCIUM SERPL-MCNC: 10.2 MG/DL (ref 8.5–10.1)
CHLORIDE SERPL-SCNC: 105 MMOL/L (ref 97–108)
CO2 SERPL-SCNC: 21 MMOL/L (ref 21–32)
CREAT SERPL-MCNC: 2.09 MG/DL (ref 0.7–1.3)
DIFFERENTIAL METHOD BLD: ABNORMAL
EOSINOPHIL # BLD: 0.3 K/UL (ref 0–0.4)
EOSINOPHIL NFR BLD: 3 % (ref 0–7)
ERYTHROCYTE [DISTWIDTH] IN BLOOD BY AUTOMATED COUNT: 13.9 % (ref 11.5–14.5)
GLOBULIN SER CALC-MCNC: 4.2 G/DL (ref 2–4)
GLUCOSE SERPL-MCNC: 114 MG/DL (ref 65–100)
HCT VFR BLD AUTO: 37.1 % (ref 36.6–50.3)
HGB BLD-MCNC: 11.6 G/DL (ref 12.1–17)
IMM GRANULOCYTES # BLD AUTO: 0 K/UL (ref 0–0.04)
IMM GRANULOCYTES NFR BLD AUTO: 1 % (ref 0–0.5)
LACTATE SERPL-SCNC: 2.4 MMOL/L (ref 0.4–2)
LACTATE SERPL-SCNC: 4.5 MMOL/L (ref 0.4–2)
LYMPHOCYTES # BLD: 1.7 K/UL (ref 0.8–3.5)
LYMPHOCYTES NFR BLD: 21 % (ref 12–49)
MAGNESIUM SERPL-MCNC: 2.2 MG/DL (ref 1.6–2.4)
MCH RBC QN AUTO: 30.4 PG (ref 26–34)
MCHC RBC AUTO-ENTMCNC: 31.3 G/DL (ref 30–36.5)
MCV RBC AUTO: 97.1 FL (ref 80–99)
MONOCYTES # BLD: 0.7 K/UL (ref 0–1)
MONOCYTES NFR BLD: 9 % (ref 5–13)
NEUTS SEG # BLD: 5.4 K/UL (ref 1.8–8)
NEUTS SEG NFR BLD: 65 % (ref 32–75)
NRBC # BLD: 0 K/UL (ref 0–0.01)
NRBC BLD-RTO: 0 PER 100 WBC
NT PRO BNP: 800 PG/ML (ref 0–450)
PLATELET # BLD AUTO: 207 K/UL (ref 150–400)
PMV BLD AUTO: 10.5 FL (ref 8.9–12.9)
POTASSIUM SERPL-SCNC: 4.5 MMOL/L (ref 3.5–5.1)
PROT SERPL-MCNC: 6.9 G/DL (ref 6.4–8.2)
RBC # BLD AUTO: 3.82 M/UL (ref 4.1–5.7)
SODIUM SERPL-SCNC: 138 MMOL/L (ref 136–145)
TROPONIN I SERPL HS-MCNC: 7 NG/L (ref 0–76)
WBC # BLD AUTO: 8.2 K/UL (ref 4.1–11.1)

## 2023-07-08 PROCEDURE — 71250 CT THORAX DX C-: CPT

## 2023-07-08 PROCEDURE — 6360000002 HC RX W HCPCS

## 2023-07-08 PROCEDURE — 83735 ASSAY OF MAGNESIUM: CPT

## 2023-07-08 PROCEDURE — 99285 EMERGENCY DEPT VISIT HI MDM: CPT

## 2023-07-08 PROCEDURE — 2060000000 HC ICU INTERMEDIATE R&B

## 2023-07-08 PROCEDURE — 80053 COMPREHEN METABOLIC PANEL: CPT

## 2023-07-08 PROCEDURE — 2580000003 HC RX 258

## 2023-07-08 PROCEDURE — 87040 BLOOD CULTURE FOR BACTERIA: CPT

## 2023-07-08 PROCEDURE — 84484 ASSAY OF TROPONIN QUANT: CPT

## 2023-07-08 PROCEDURE — 74176 CT ABD & PELVIS W/O CONTRAST: CPT

## 2023-07-08 PROCEDURE — 83605 ASSAY OF LACTIC ACID: CPT

## 2023-07-08 PROCEDURE — 93005 ELECTROCARDIOGRAM TRACING: CPT

## 2023-07-08 PROCEDURE — 36415 COLL VENOUS BLD VENIPUNCTURE: CPT

## 2023-07-08 PROCEDURE — 83880 ASSAY OF NATRIURETIC PEPTIDE: CPT

## 2023-07-08 PROCEDURE — 85025 COMPLETE CBC W/AUTO DIFF WBC: CPT

## 2023-07-08 RX ORDER — SODIUM CHLORIDE, SODIUM LACTATE, POTASSIUM CHLORIDE, AND CALCIUM CHLORIDE .6; .31; .03; .02 G/100ML; G/100ML; G/100ML; G/100ML
500 INJECTION, SOLUTION INTRAVENOUS ONCE
Status: COMPLETED | OUTPATIENT
Start: 2023-07-08 | End: 2023-07-08

## 2023-07-08 RX ORDER — SODIUM CHLORIDE 0.9 % (FLUSH) 0.9 %
5-40 SYRINGE (ML) INJECTION PRN
Status: DISCONTINUED | OUTPATIENT
Start: 2023-07-08 | End: 2023-07-10 | Stop reason: HOSPADM

## 2023-07-08 RX ORDER — SODIUM CHLORIDE, SODIUM LACTATE, POTASSIUM CHLORIDE, AND CALCIUM CHLORIDE .6; .31; .03; .02 G/100ML; G/100ML; G/100ML; G/100ML
1000 INJECTION, SOLUTION INTRAVENOUS ONCE
Status: COMPLETED | OUTPATIENT
Start: 2023-07-08 | End: 2023-07-08

## 2023-07-08 RX ORDER — SODIUM CHLORIDE 0.9 % (FLUSH) 0.9 %
5-40 SYRINGE (ML) INJECTION EVERY 12 HOURS SCHEDULED
Status: DISCONTINUED | OUTPATIENT
Start: 2023-07-08 | End: 2023-07-10 | Stop reason: HOSPADM

## 2023-07-08 RX ORDER — SODIUM CHLORIDE 9 MG/ML
INJECTION, SOLUTION INTRAVENOUS PRN
Status: DISCONTINUED | OUTPATIENT
Start: 2023-07-08 | End: 2023-07-10 | Stop reason: HOSPADM

## 2023-07-08 RX ADMIN — PIPERACILLIN AND TAZOBACTAM 4500 MG: 4; .5 INJECTION, POWDER, LYOPHILIZED, FOR SOLUTION INTRAVENOUS at 16:37

## 2023-07-08 RX ADMIN — VANCOMYCIN HYDROCHLORIDE 750 MG: 750 INJECTION, POWDER, LYOPHILIZED, FOR SOLUTION INTRAVENOUS at 17:27

## 2023-07-08 RX ADMIN — SODIUM CHLORIDE, POTASSIUM CHLORIDE, SODIUM LACTATE AND CALCIUM CHLORIDE 1000 ML: 600; 310; 30; 20 INJECTION, SOLUTION INTRAVENOUS at 16:37

## 2023-07-08 RX ADMIN — SODIUM CHLORIDE, POTASSIUM CHLORIDE, SODIUM LACTATE AND CALCIUM CHLORIDE 500 ML: 600; 310; 30; 20 INJECTION, SOLUTION INTRAVENOUS at 14:14

## 2023-07-08 RX ADMIN — SODIUM CHLORIDE, SODIUM LACTATE, POTASSIUM CHLORIDE, AND CALCIUM CHLORIDE 500 ML: 600; 310; 30; 20 INJECTION, SOLUTION INTRAVENOUS at 14:14

## 2023-07-08 ASSESSMENT — ENCOUNTER SYMPTOMS
SHORTNESS OF BREATH: 0
COUGH: 0
VOMITING: 0
ABDOMINAL PAIN: 0
NAUSEA: 0

## 2023-07-08 ASSESSMENT — PAIN SCALES - GENERAL
PAINLEVEL_OUTOF10: 0
PAINLEVEL_OUTOF10: 0

## 2023-07-08 NOTE — ED PROVIDER NOTES
[facility] EMERGENCY CTR  EMERGENCY DEPARTMENT ENCOUNTER      Pt Name: Claudia William  MRN: [id number]  [address] [facility] 1940  Date of evaluation: 7/8/2023  Provider: Fabricio Crouch PA-C    CHIEF COMPLAINT       Chief Complaint   Patient presents with    Hypotension         HISTORY OF PRESENT ILLNESS   (Location/Symptom, Timing/Onset, Context/Setting, Quality, Duration, Modifying Factors, Severity)  Note limiting factors. Claudia William is a 81 y.o. male with history of  has a past medical history of Bladder cancer ([address]), CAD (coronary artery disease), prostate cancer ([address]), Macular degeneration, and MI (myocardial infarction) ([address]) (01/01/2004). who presents with his son to [place] ED with cc of decreased appetite and hypotension. Presents with his son for his low blood pressure. Home readings with SBP in the 60s. Patient has no specific complaints. Does endorse an episode of emesis yesterday, but denies any abdominal pain or nausea currently. Reports decreased appetite. Patient's son has been encouraging fluids, but patient is not interested, and has not been drinking or eating much recently. Patient has had 2 recent falls. 1 about 3 weeks ago and a second about 2 weeks ago. He did have some episodes of emesis following the initial fall. After the second fall, he presented to [facility] ED, had a negative head CT. He has since been receiving home health care, and the home health care nurse has been measuring his blood pressures. This is how the patient and his family discover that he is having low blood pressures. Patient son reports that he is also fatigued, weak, and appears dehydrated. Patient denies any chest, abdominal pain, shortness of breath, nausea, vomiting, diarrhea. States that he has difficulty urinating due to prostate cancer. Wears a brief and \"dribbles\". He is on Bactrim as a UTI prophylaxis. Takes Mireille Chimera (darolutamide) for his prostate cancer.          PCP: Anabella Daniels

## 2023-07-08 NOTE — ED TRIAGE NOTES
Low BP per pt (low 80/low60s). He fell hit head a week, was evaluated at [facility]. No eating/drinking \"enough\".  Skin is tenting and family feels he is dehydrated

## 2023-07-08 NOTE — ED NOTES
BP 90/60's, MD and PA made aware, Fluid rate increased per orders. Will continue to monitor.      Ria Huertas RN  07/08/23 1500

## 2023-07-09 PROBLEM — N13.30 HYDROURETERONEPHROSIS: Status: ACTIVE | Noted: 2023-07-09

## 2023-07-09 LAB
-: NORMAL
ALBUMIN SERPL-MCNC: 2.4 G/DL (ref 3.5–5)
ALBUMIN/GLOB SERPL: 0.8 (ref 1.1–2.2)
ALP SERPL-CCNC: 110 U/L (ref 45–117)
ALT SERPL-CCNC: 526 U/L (ref 12–78)
AMMONIA PLAS-SCNC: 48 UMOL/L
ANION GAP SERPL CALC-SCNC: 5 MMOL/L (ref 5–15)
AST SERPL-CCNC: 264 U/L (ref 15–37)
BILIRUB SERPL-MCNC: 0.4 MG/DL (ref 0.2–1)
BUN SERPL-MCNC: 26 MG/DL (ref 6–20)
BUN/CREAT SERPL: 18 (ref 12–20)
CALCIUM SERPL-MCNC: 9.4 MG/DL (ref 8.5–10.1)
CHLORIDE SERPL-SCNC: 114 MMOL/L (ref 97–108)
CO2 SERPL-SCNC: 22 MMOL/L (ref 21–32)
CREAT SERPL-MCNC: 1.41 MG/DL (ref 0.7–1.3)
EKG ATRIAL RATE: 87 BPM
EKG DIAGNOSIS: NORMAL
EKG P AXIS: 27 DEGREES
EKG P-R INTERVAL: 170 MS
EKG Q-T INTERVAL: 382 MS
EKG QRS DURATION: 126 MS
EKG QTC CALCULATION (BAZETT): 459 MS
EKG R AXIS: 3 DEGREES
EKG T AXIS: -6 DEGREES
EKG VENTRICULAR RATE: 87 BPM
GLOBULIN SER CALC-MCNC: 3.1 G/DL (ref 2–4)
GLUCOSE SERPL-MCNC: 97 MG/DL (ref 65–100)
HAV IGM SER QL: NONREACTIVE
HBV CORE IGM SER QL: NONREACTIVE
HBV SURFACE AG SER QL: <0.1 INDEX
HBV SURFACE AG SER QL: NEGATIVE
HCV AB SERPL QL IA: NONREACTIVE
LACTATE SERPL-SCNC: 1 MMOL/L (ref 0.4–2)
PHOSPHATE SERPL-MCNC: 3.1 MG/DL (ref 2.6–4.7)
POTASSIUM SERPL-SCNC: 4.4 MMOL/L (ref 3.5–5.1)
PROT SERPL-MCNC: 5.5 G/DL (ref 6.4–8.2)
SODIUM SERPL-SCNC: 141 MMOL/L (ref 136–145)

## 2023-07-09 PROCEDURE — 93010 ELECTROCARDIOGRAM REPORT: CPT | Performed by: INTERNAL MEDICINE

## 2023-07-09 PROCEDURE — 82140 ASSAY OF AMMONIA: CPT

## 2023-07-09 PROCEDURE — 36415 COLL VENOUS BLD VENIPUNCTURE: CPT

## 2023-07-09 PROCEDURE — 6370000000 HC RX 637 (ALT 250 FOR IP): Performed by: FAMILY MEDICINE

## 2023-07-09 PROCEDURE — 84100 ASSAY OF PHOSPHORUS: CPT

## 2023-07-09 PROCEDURE — 80074 ACUTE HEPATITIS PANEL: CPT

## 2023-07-09 PROCEDURE — 80053 COMPREHEN METABOLIC PANEL: CPT

## 2023-07-09 PROCEDURE — 2580000003 HC RX 258

## 2023-07-09 PROCEDURE — 2060000000 HC ICU INTERMEDIATE R&B

## 2023-07-09 PROCEDURE — 83605 ASSAY OF LACTIC ACID: CPT

## 2023-07-09 PROCEDURE — 76937 US GUIDE VASCULAR ACCESS: CPT

## 2023-07-09 PROCEDURE — 2580000003 HC RX 258: Performed by: FAMILY MEDICINE

## 2023-07-09 PROCEDURE — 6360000002 HC RX W HCPCS: Performed by: FAMILY MEDICINE

## 2023-07-09 RX ORDER — MV-MIN/FA/VIT K/LUTEIN/ZEAXANT 200MCG-5MG
1 CAPSULE ORAL DAILY
Status: DISCONTINUED | OUTPATIENT
Start: 2023-07-09 | End: 2023-07-09

## 2023-07-09 RX ORDER — SODIUM CHLORIDE 0.9 % (FLUSH) 0.9 %
5-40 SYRINGE (ML) INJECTION PRN
Status: DISCONTINUED | OUTPATIENT
Start: 2023-07-09 | End: 2023-07-10 | Stop reason: HOSPADM

## 2023-07-09 RX ORDER — SENNA AND DOCUSATE SODIUM 50; 8.6 MG/1; MG/1
1 TABLET, FILM COATED ORAL DAILY PRN
Status: DISCONTINUED | OUTPATIENT
Start: 2023-07-09 | End: 2023-07-10 | Stop reason: HOSPADM

## 2023-07-09 RX ORDER — ONDANSETRON 2 MG/ML
4 INJECTION INTRAMUSCULAR; INTRAVENOUS EVERY 6 HOURS PRN
Status: DISCONTINUED | OUTPATIENT
Start: 2023-07-09 | End: 2023-07-10 | Stop reason: HOSPADM

## 2023-07-09 RX ORDER — ONDANSETRON 4 MG/1
4 TABLET, ORALLY DISINTEGRATING ORAL EVERY 8 HOURS PRN
Status: DISCONTINUED | OUTPATIENT
Start: 2023-07-09 | End: 2023-07-10 | Stop reason: HOSPADM

## 2023-07-09 RX ORDER — ACETAMINOPHEN 325 MG/1
650 TABLET ORAL EVERY 6 HOURS PRN
Status: DISCONTINUED | OUTPATIENT
Start: 2023-07-09 | End: 2023-07-10 | Stop reason: HOSPADM

## 2023-07-09 RX ORDER — SODIUM CHLORIDE 9 MG/ML
INJECTION, SOLUTION INTRAVENOUS PRN
Status: DISCONTINUED | OUTPATIENT
Start: 2023-07-09 | End: 2023-07-10 | Stop reason: HOSPADM

## 2023-07-09 RX ORDER — SODIUM CHLORIDE 9 MG/ML
INJECTION, SOLUTION INTRAVENOUS CONTINUOUS
Status: DISPENSED | OUTPATIENT
Start: 2023-07-09 | End: 2023-07-09

## 2023-07-09 RX ORDER — ACETAMINOPHEN 650 MG/1
650 SUPPOSITORY RECTAL EVERY 6 HOURS PRN
Status: DISCONTINUED | OUTPATIENT
Start: 2023-07-09 | End: 2023-07-10 | Stop reason: HOSPADM

## 2023-07-09 RX ORDER — SODIUM CHLORIDE 0.9 % (FLUSH) 0.9 %
5-40 SYRINGE (ML) INJECTION EVERY 12 HOURS SCHEDULED
Status: DISCONTINUED | OUTPATIENT
Start: 2023-07-09 | End: 2023-07-10 | Stop reason: HOSPADM

## 2023-07-09 RX ORDER — POLYETHYLENE GLYCOL 3350 17 G/17G
17 POWDER, FOR SOLUTION ORAL DAILY PRN
Status: DISCONTINUED | OUTPATIENT
Start: 2023-07-09 | End: 2023-07-10 | Stop reason: HOSPADM

## 2023-07-09 RX ORDER — SODIUM CHLORIDE 9 MG/ML
INJECTION, SOLUTION INTRAVENOUS CONTINUOUS
Status: DISCONTINUED | OUTPATIENT
Start: 2023-07-09 | End: 2023-07-09

## 2023-07-09 RX ORDER — LISINOPRIL 5 MG/1
5 TABLET ORAL DAILY
Status: DISCONTINUED | OUTPATIENT
Start: 2023-07-09 | End: 2023-07-10 | Stop reason: HOSPADM

## 2023-07-09 RX ADMIN — SODIUM CHLORIDE, PRESERVATIVE FREE 10 ML: 5 INJECTION INTRAVENOUS at 09:00

## 2023-07-09 RX ADMIN — PIPERACILLIN AND TAZOBACTAM 3375 MG: 3; .375 INJECTION, POWDER, LYOPHILIZED, FOR SOLUTION INTRAVENOUS at 21:45

## 2023-07-09 RX ADMIN — VANCOMYCIN HYDROCHLORIDE 1000 MG: 1 INJECTION, POWDER, LYOPHILIZED, FOR SOLUTION INTRAVENOUS at 18:25

## 2023-07-09 RX ADMIN — PIPERACILLIN AND TAZOBACTAM 3375 MG: 3; .375 INJECTION, POWDER, LYOPHILIZED, FOR SOLUTION INTRAVENOUS at 06:29

## 2023-07-09 RX ADMIN — PIPERACILLIN AND TAZOBACTAM 3375 MG: 3; .375 INJECTION, POWDER, LYOPHILIZED, FOR SOLUTION INTRAVENOUS at 14:00

## 2023-07-09 RX ADMIN — SODIUM CHLORIDE: 9 INJECTION, SOLUTION INTRAVENOUS at 00:56

## 2023-07-09 RX ADMIN — SODIUM CHLORIDE, PRESERVATIVE FREE 10 ML: 5 INJECTION INTRAVENOUS at 00:56

## 2023-07-09 RX ADMIN — LISINOPRIL 5 MG: 5 TABLET ORAL at 09:01

## 2023-07-09 RX ADMIN — SODIUM CHLORIDE: 9 INJECTION, SOLUTION INTRAVENOUS at 06:34

## 2023-07-09 ASSESSMENT — PAIN SCALES - GENERAL: PAINLEVEL_OUTOF10: 0

## 2023-07-09 NOTE — ED NOTES
Report given to [facility] provider. All paperwork given to provider. PCS, facesheet, ED Summary.       Jasmin Pretty RN  07/08/23 6756

## 2023-07-09 NOTE — PLAN OF CARE
Problem: Discharge Planning  Goal: Discharge to home or other facility with appropriate resources  7/9/2023 1252 by Sara Zacarias RN  Outcome: Progressing  7/9/2023 0321 by Margie Pepe RN  Outcome: Progressing     Problem: Safety - Adult  Goal: Free from fall injury  7/9/2023 1252 by Sara Zacarias RN  Outcome: Progressing  7/9/2023 0321 by Margie Pepe RN  Outcome: Progressing     Problem: ABCDS Injury Assessment  Goal: Absence of physical injury  7/9/2023 1252 by Sara Zacarias RN  Outcome: Progressing  7/9/2023 0321 by Margie Pepe RN  Outcome: Progressing

## 2023-07-09 NOTE — ED NOTES
TRANSFER - OUT REPORT:    Verbal report given to [facility] RN on [address]  being transferred to [facility] Room 207 for routine progression of patient care       Report consisted of patient's Situation, Background, Assessment and   Recommendations(SBAR). Information from the following report(s) Nurse Handoff Report, ED Encounter Summary, ED SBAR, [facility], and Recent Results was reviewed with the receiving nurse. [place] Fall Assessment:    Presents to emergency department  because of falls (Syncope, seizure, or loss of consciousness): Yes (dizzy)  Age > 79: Yes  Altered Mental Status, Intoxication with alcohol or substance confusion (Disorientation, impaired judgment, poor safety awaremess, or inability to follow instructions): No  Impaired Mobility: Ambulates or transfers with assistive devices or assistance; Unable to ambulate or transer.: Yes  Nursing Judgement: Yes          Lines:   Peripheral IV 07/08/23 Left;Proximal;Anterior Forearm (Active)        Opportunity for questions and clarification was provided.       Patient transported with:  O2 @ 2lpm          Alexandra Sanon RN  07/08/23 6050

## 2023-07-09 NOTE — CONSULTS
Urology Consult    Patient: Lyndsey Prasad MRN: [id number]  SSN: [id number]    YOB: 1940  Age: 81 y.o. Sex: male          Date of Consultation:  July 9, 2023  Requesting Physician: Jes Oneill MD  Reason for Consultation:  hydronephrosis         History of Present Illness:  Lyndsey Prasad is a 81 y.o. male admitted 7/8/2023 to the hospital for Generalized weakness [R53.1]  Hypotension, unspecified hypotension type [I95.9]  Acute lactic acidosis [E87.21]. He has metastatic prostate cancer and h/o bladder cancer. He was seen in a freestanding ED yesterday for low BP, falls, and decreased PO intake. He was hypotensive on arrival to the ED. Creatinine was elevated at 2.09.  UA was concerning for infection. CT showed moderate bilateral hydroureteronephrosis down to the bladder, stable on the right compared to previous CT on 11/24/22 but new on the left. There was also stable bladder wall thickening. He denies pain, N/V, dysuria, or difficulty voiding. Creatinine has improved today to 1.4. Past Medical History:   Diagnosis Date    Bladder cancer ([address] [facility])     CAD (coronary artery disease)     MI 2004    Cancer [facility])     prostate s/p xrt    Macular degeneration     MI (myocardial infarction) ([address] [facility]) 01/01/2004        Past Surgical History:   Procedure Laterality Date    APPENDECTOMY  1955    CATARACT REMOVAL      bilateral    [place] UNLISTED PROCEDURE CARDIAC SURGERY      2 Stents    UROLOGICAL SURGERY  6/24/15    RIGHT ROBOTIC PARTIAL NEPHRECTOMY  (LATEX ALLERGY), renal exploration, cystectomy x 3       Allergies   Allergen Reactions    Latex Other (See Comments)     Blistering of the skin        Prior to Admission medications    Medication Sig Start Date End Date Taking? Authorizing Provider   acetaminophen (TYLENOL) 500 MG tablet Take 1,000 mg by mouth every 6 hours as needed for Pain (mild ).     Historical Provider, MD   Misc [employer] ([facility]) Take

## 2023-07-09 NOTE — PROCEDURES
Inserted 20 gauge 6 cm  Extended Dwell Peripheral Catheter into right lower arm using ultrasound guidance and sterile technique. The  catheter is an extended dwell peripheral catheter and may remain in place 29 days. Blood samples can be obtained from this catheter. May place tourniquet 6-8 cm above insertion site if  unable to obtain blood return. See post blood sampling flushes. Dressings needs to be changed with every 7 days by nurse. Patient tolerated procedure well, denies questions or concerns at this time.      Mo Hylton, RN, BSN [facility]  Vascular Access Nurse

## 2023-07-10 ENCOUNTER — HOME CARE VISIT (OUTPATIENT)
Dept: HOME HEALTH SERVICES | Facility: HOME HEALTH | Age: 83
End: 2023-07-10

## 2023-07-10 VITALS
TEMPERATURE: 98.5 F | HEIGHT: 69 IN | SYSTOLIC BLOOD PRESSURE: 101 MMHG | DIASTOLIC BLOOD PRESSURE: 59 MMHG | HEART RATE: 71 BPM | BODY MASS INDEX: 23.97 KG/M2 | RESPIRATION RATE: 18 BRPM | OXYGEN SATURATION: 96 % | WEIGHT: 161.82 LBS

## 2023-07-10 PROBLEM — R53.1 GENERALIZED WEAKNESS: Status: RESOLVED | Noted: 2023-07-08 | Resolved: 2023-07-10

## 2023-07-10 PROBLEM — E87.21 ACUTE LACTIC ACIDOSIS: Status: RESOLVED | Noted: 2023-07-08 | Resolved: 2023-07-10

## 2023-07-10 PROBLEM — N13.30 HYDROURETERONEPHROSIS: Status: RESOLVED | Noted: 2023-07-09 | Resolved: 2023-07-10

## 2023-07-10 LAB
ALBUMIN SERPL-MCNC: 2.4 G/DL (ref 3.5–5)
ALBUMIN/GLOB SERPL: 0.8 (ref 1.1–2.2)
ALP SERPL-CCNC: 98 U/L (ref 45–117)
ALT SERPL-CCNC: 516 U/L (ref 12–78)
ANION GAP SERPL CALC-SCNC: 5 MMOL/L (ref 5–15)
APTT PPP: 29.7 SEC (ref 22.1–31)
AST SERPL-CCNC: 273 U/L (ref 15–37)
BACTERIA SPEC CULT: NORMAL
BACTERIA SPEC CULT: NORMAL
BASOPHILS # BLD: 0 K/UL (ref 0–0.1)
BASOPHILS NFR BLD: 0 % (ref 0–1)
BILIRUB SERPL-MCNC: 0.6 MG/DL (ref 0.2–1)
BUN SERPL-MCNC: 16 MG/DL (ref 6–20)
BUN/CREAT SERPL: 12 (ref 12–20)
CALCIUM SERPL-MCNC: 9.7 MG/DL (ref 8.5–10.1)
CHLORIDE SERPL-SCNC: 114 MMOL/L (ref 97–108)
CO2 SERPL-SCNC: 24 MMOL/L (ref 21–32)
CREAT SERPL-MCNC: 1.3 MG/DL (ref 0.7–1.3)
DIFFERENTIAL METHOD BLD: ABNORMAL
EOSINOPHIL # BLD: 0.4 K/UL (ref 0–0.4)
EOSINOPHIL NFR BLD: 5 % (ref 0–7)
ERYTHROCYTE [DISTWIDTH] IN BLOOD BY AUTOMATED COUNT: 13.7 % (ref 11.5–14.5)
GLOBULIN SER CALC-MCNC: 3.2 G/DL (ref 2–4)
GLUCOSE SERPL-MCNC: 91 MG/DL (ref 65–100)
HCT VFR BLD AUTO: 29.9 % (ref 36.6–50.3)
HGB BLD-MCNC: 9.7 G/DL (ref 12.1–17)
IMM GRANULOCYTES # BLD AUTO: 0 K/UL (ref 0–0.04)
IMM GRANULOCYTES NFR BLD AUTO: 0 % (ref 0–0.5)
INR PPP: 1.3 (ref 0.9–1.1)
LYMPHOCYTES # BLD: 1.8 K/UL (ref 0.8–3.5)
LYMPHOCYTES NFR BLD: 27 % (ref 12–49)
MCH RBC QN AUTO: 30.9 PG (ref 26–34)
MCHC RBC AUTO-ENTMCNC: 32.4 G/DL (ref 30–36.5)
MCV RBC AUTO: 95.2 FL (ref 80–99)
MONOCYTES # BLD: 0.6 K/UL (ref 0–1)
MONOCYTES NFR BLD: 8 % (ref 5–13)
NEUTS SEG # BLD: 4.1 K/UL (ref 1.8–8)
NEUTS SEG NFR BLD: 60 % (ref 32–75)
NRBC # BLD: 0 K/UL (ref 0–0.01)
NRBC BLD-RTO: 0 PER 100 WBC
PLATELET # BLD AUTO: 227 K/UL (ref 150–400)
PMV BLD AUTO: 9.9 FL (ref 8.9–12.9)
POTASSIUM SERPL-SCNC: 4.3 MMOL/L (ref 3.5–5.1)
PROT SERPL-MCNC: 5.6 G/DL (ref 6.4–8.2)
PROTHROMBIN TIME: 13 SEC (ref 9–11.1)
RBC # BLD AUTO: 3.14 M/UL (ref 4.1–5.7)
SERVICE CMNT-IMP: NORMAL
SODIUM SERPL-SCNC: 143 MMOL/L (ref 136–145)
THERAPEUTIC RANGE: NORMAL SECS (ref 58–77)
WBC # BLD AUTO: 6.9 K/UL (ref 4.1–11.1)

## 2023-07-10 PROCEDURE — 6370000000 HC RX 637 (ALT 250 FOR IP): Performed by: FAMILY MEDICINE

## 2023-07-10 PROCEDURE — 36415 COLL VENOUS BLD VENIPUNCTURE: CPT

## 2023-07-10 PROCEDURE — 80053 COMPREHEN METABOLIC PANEL: CPT

## 2023-07-10 PROCEDURE — 2580000003 HC RX 258

## 2023-07-10 PROCEDURE — 2580000003 HC RX 258: Performed by: FAMILY MEDICINE

## 2023-07-10 PROCEDURE — 85610 PROTHROMBIN TIME: CPT

## 2023-07-10 PROCEDURE — 76937 US GUIDE VASCULAR ACCESS: CPT

## 2023-07-10 PROCEDURE — 85025 COMPLETE CBC W/AUTO DIFF WBC: CPT

## 2023-07-10 PROCEDURE — 85730 THROMBOPLASTIN TIME PARTIAL: CPT

## 2023-07-10 RX ADMIN — LISINOPRIL 5 MG: 5 TABLET ORAL at 09:16

## 2023-07-10 RX ADMIN — SODIUM CHLORIDE, PRESERVATIVE FREE 10 ML: 5 INJECTION INTRAVENOUS at 08:59

## 2023-07-10 RX ADMIN — SODIUM CHLORIDE, PRESERVATIVE FREE 10 ML: 5 INJECTION INTRAVENOUS at 09:00

## 2023-07-10 ASSESSMENT — ENCOUNTER SYMPTOMS: HEMOPTYSIS: 0

## 2023-07-10 ASSESSMENT — PAIN SCALES - GENERAL: PAINLEVEL_OUTOF10: 0

## 2023-07-10 NOTE — DISCHARGE SUMMARY
Discharge Summary       PATIENT ID: Syed Martinez  MRN: [id number]   YOB: 1940    DATE OF ADMISSION: 7/8/2023 12:34 PM    DATE OF DISCHARGE: 7/10/2023   PRIMARY CARE PROVIDER: Nathaniel Naranjo DO     ATTENDING PHYSICIAN: Dr Saintclair Aurora  DISCHARGING PROVIDER: Saintclair Aurora, MD    To contact this individual call [phone] and ask the  to page. If unavailable ask to be transferred the Adult Hospitalist Department. CONSULTATIONS: IP CONSULT TO UROLOGY  IP CONSULT TO UROLOGY  IP CONSULT TO PHARMACY  IP CONSULT TO SPIRITUAL SERVICES    PROCEDURES/SURGERIES: * No surgery found *    ADMITTING DIAGNOSES & HOSPITAL COURSE:   Acute kidney injury (STACY)--continues to improve  Hypotension: resolved    Probable sepsis  History of UTI  -follow cultures  -On Vanc/Zosyn, will discontinue antibiotics, the patient did not have sepsis. He is fine with not getting discharged on antibiotics    Hydroureteronephrosis  History of Prostate/bladder cancer  -Bilateral hydronephrosis with diffuse bilateral hydroureter similar to prior study on the right and new on the left  -Appreciate Urology, cleared for discharge     Elevated LFTs probably from hypotension, dehydration  -Appreciate discussion with GI, likely from above.  Will hold for consult for now  -Outpatient follow up with PMD    CAD: home meds      PENDING TEST RESULTS:   At the time of discharge the following test results are still pending: none    FOLLOW UP APPOINTMENTS:    PCP  Urology    ADDITIONAL CARE RECOMMENDATIONS:   Please note that your PMD might want to do a blood work (CBC, CMP)     DIET: cardiac diet    ACTIVITY: activity as tolerated      DISCHARGE MEDICATIONS:     Medication List        CONTINUE taking these medications      * abiraterone acetate 250 MG tablet  Commonly known as: ZYTIGA     * abiraterone acetate 250 MG tablet  Commonly known as: ZYTIGA     aspirin 81 MG EC tablet     bisacodyl 5 MG EC tablet  Commonly known as: DULCOLAX

## 2023-07-10 NOTE — DISCHARGE INSTRUCTIONS
Discharge Instructions       PATIENT ID: Ana Bolaños  MRN: [id number]   YOB: 1940    DATE OF ADMISSION: [unfilled]    DATE OF DISCHARGE: 7/10/2023    PRIMARY CARE PROVIDER: @PCP@     ATTENDING PHYSICIAN: [unfilled]  DISCHARGING PROVIDER: Aster Moore MD    To contact this individual call [phone] and ask the  to page. If unavailable ask to be transferred the Adult Hospitalist Department. DISCHARGE DIAGNOSES Hydroureteronephrosis/dehydrration    CONSULTATIONS: Urology    PROCEDURES/SURGERIES: * No surgery found *    PENDING TEST RESULTS:   At the time of discharge the following test results are still pending: none    FOLLOW UP APPOINTMENTS:   PCP  Urology    ADDITIONAL CARE RECOMMENDATIONS:   Please note that your PMD might want to do a blood work (CBC, CMP)     DIET: cardiac diet    ACTIVITY: activity as tolerated      DISCHARGE MEDICATIONS:   See Medication Reconciliation Form    It is important that you take the medication exactly as they are prescribed. Keep your medication in the bottles provided by the pharmacist and keep a list of the medication names, dosages, and times to be taken in your wallet. Do not take other medications without consulting your doctor. NOTIFY YOUR PHYSICIAN FOR ANY OF THE FOLLOWING:   Fever over 101 degrees for 24 hours. Chest pain, shortness of breath, fever, chills, nausea, vomiting, diarrhea, change in mentation, falling, weakness, bleeding. Severe pain or pain not relieved by medications. Or, any other signs or symptoms that you may have questions about.       DISPOSITION:  x  Home With:   OT  PT  HH  RN       SNF/Inpatient Rehab/LTAC    Independent/assisted living    Hospice    Other:     CDMP Checked:   Yes x     PROBLEM LIST Updated:  Yes x       Signed:   Aster Moore MD  7/10/2023  10:47 AM

## 2023-07-10 NOTE — PROCEDURES
Extended dwell PIV (AccuCath) placed using ultrasound guidance technique. The IV flushed easily and had brisk blood return. The patient tolerated the procedure well. Catheter Size: 20 gauge. Total length: 5.7 cm  External length: 0 cm. Location: right forearm     May be used for blood draws--flush, waste 3ml, draw labs, flush post lab draw with at least 10ml NS and clamp if not infusing to maintain patency.  (AccuCath is power injectable to 6 mL/sec at 300 psi.)     Mack Chakraborty, RN  Vascular Access Nurse

## 2023-07-10 NOTE — CARE COORDINATION
CM received call from [facility] with [address] requesting orders to resume home health SN, PT, and OT. Orders placed and there are no other identified CM needs at this time.      [facility], [address]

## 2023-07-10 NOTE — PROGRESS NOTES
Comprehensive Nutrition Assessment    Type and Reason for Visit: Initial, Positive Nutrition Screen    Nutrition Recommendations/Plan:     Regular diet  High calorie high protein ons BID, yogurt once daily         Malnutrition Assessment:  Malnutrition Status: Moderate malnutrition (07/10/23 1049)    Context:  Acute Illness     Findings of the 6 clinical characteristics of malnutrition:  Energy Intake:  50% or less of estimated energy requirements for 5 or more days  Weight Loss:  5% over 1 month     Body Fat Loss:  No significant body fat loss     Muscle Mass Loss:  No significant muscle mass loss    Fluid Accumulation:  No significant fluid accumulation     Strength:  Not Performed       Nutrition Assessment:    PMHx: metastatic prostate cancer, bladder cancer, CAD, MI    81 y.o. male admitted with STACY/dehydration now s/p IVF. Visited with pt and family at bedside. Pt/family reported 10# wt loss x 2 weeks which is supported by wt history. Pt has also lost some wt x 1 year per wt history. -4.8% UBW x 1 month or -11% x 1 year which is considered mildly significant. Was eating poorly for 2 weeks pta d/t ongoing n/v. Pt reportedly fell and hit his head, and then had ongoing n/v. Was eating very small amounts, maybe 1-2 bites of food per meal. Meets ASPEN criteria for moderate acute malnutrition. See above. Discusses strategies for wt gain/maintenance and ways to combat poor appetite including use of small frequent meals dense in kcals/protein, which foods contain protein, eating every 2 hours. Pt expressed understanding. Appetite has improved since n/v resolved this admission. Likes lucas ensure, yogurt. Pt denied any food restrictions. Removed pork restriction from diet. Denied problems chewing/swallowing. Noted discharge summary now in chart. Labs reviewed.       Nutritionally Significant Medications:  Zosyn, vancocin      Estimated Daily Nutrient Needs:  Energy Requirements Based On: Kcal/kg  Weight Used

## 2023-07-10 NOTE — PROGRESS NOTES
I have reviewed the discharge instructions with the patient, his wife, and his son. The patient, his wife, and his son verbalized understanding. They were given the opportunity to ask questions and express concerns, they had no questions or concerns at this time. Patient's IV access and tele box discontinued. Patient stable and ready for discharge.

## 2023-07-11 ENCOUNTER — HOME CARE VISIT (OUTPATIENT)
Facility: HOME HEALTH | Age: 83
End: 2023-07-11

## 2023-07-11 VITALS
TEMPERATURE: 97.9 F | RESPIRATION RATE: 20 BRPM | DIASTOLIC BLOOD PRESSURE: 62 MMHG | OXYGEN SATURATION: 92 % | WEIGHT: 160 LBS | HEART RATE: 80 BPM | SYSTOLIC BLOOD PRESSURE: 98 MMHG | BODY MASS INDEX: 23.63 KG/M2

## 2023-07-11 VITALS
SYSTOLIC BLOOD PRESSURE: 92 MMHG | RESPIRATION RATE: 20 BRPM | WEIGHT: 160 LBS | DIASTOLIC BLOOD PRESSURE: 62 MMHG | TEMPERATURE: 97.4 F | HEART RATE: 91 BPM | BODY MASS INDEX: 23.63 KG/M2 | OXYGEN SATURATION: 91 %

## 2023-07-11 VITALS
RESPIRATION RATE: 17 BRPM | HEART RATE: 81 BPM | SYSTOLIC BLOOD PRESSURE: 110 MMHG | TEMPERATURE: 97.5 F | DIASTOLIC BLOOD PRESSURE: 60 MMHG | OXYGEN SATURATION: 95 %

## 2023-07-11 PROCEDURE — G0151 HHCP-SERV OF PT,EA 15 MIN: HCPCS

## 2023-07-11 PROCEDURE — G0299 HHS/HOSPICE OF RN EA 15 MIN: HCPCS

## 2023-07-11 ASSESSMENT — ENCOUNTER SYMPTOMS
HEMOPTYSIS: 0
DYSPNEA ACTIVITY LEVEL: AFTER AMBULATING 10 - 20 FT

## 2023-07-11 NOTE — HOME HEALTH
Subjective: I'm a little tired today  Falls since last visit No(if yes complete the Fall Tracking Form and include bsrifallreport):   Caregiver involvement changes: no  Home health supplies by type and quantity ordered/delivered this visit include: no    Clinician asked if patient has had any physician contact since last home care visit and patient states: NO  Clinician asked if patient has any new or changed medications and patient states:  NO   If Yes, were medications reconciled? NO   Was the certifying physician notified of changes in medications? NO     Clinical assessment (what this visit means for the patient overall and need for ongoing skilled care) and progress or lack of progress towards SPECIFIC goals: pt bp low - family state he is not drinking much. Had pt drink 8 oz glass gatoraid and family to call in 2 hours with updated bp. Family did call and bp did elevate to 110/68. Family instructed to take pt to er if bp did not come up after drinking. SN for assess/monitor/teach    Written Teaching Material Utilized: N/A    Interdisciplinary communication with: pcp Physician for the purpose of low bp    Discharge planning as follows:  When goals are met    Specific plan for next visit: VS assess/monitor/teach

## 2023-07-11 NOTE — HOME HEALTH
provide care in case your home health nurse cannot get to your house. Make sure you have all of your paperwork i.e. written emergency preparedness plan, Identification, insurance cards, DME phone number, physician and pharmacy phone number, agency phone number, and your medications in one place for easy access and in a zip lock bag to protect them. Take your Admission Handbook, written emergency preparedness plan, written medication list, necessary supplies and folder if you relocate in the event of an emergency, if possible. Call agency if you relocate so we can contact you. Patient/Caregiver verbalize knowledge of above through teach back with 90% percent accuracy.

## 2023-07-12 ENCOUNTER — HOME CARE VISIT (OUTPATIENT)
Facility: HOME HEALTH | Age: 83
End: 2023-07-12

## 2023-07-12 PROCEDURE — G0152 HHCP-SERV OF OT,EA 15 MIN: HCPCS

## 2023-07-12 NOTE — HOME HEALTH
Subjective: Patient reports that he is feeling luch better since getting fluids in the hospital . Patient is a known patient to homecare and was just evaluated by PT on Friday. Patient ws sent to the hospital on Saturday due to hypotension. Patient returned to his tri level home with bedroom on top level of home on Monday. Patient lives with his supportive family. Patient's PLOF was independent for mobility and ADLs. Patient's CLOF is HAYDEN to min assist for mobility using cane and min assist for ADLs. Patient is having difficulty ambulating, transferring and performing ADLs. patient is benefit from continued PT 2w4 in order to acheive goals and regain independence. Falls since last visit no  Caregiver involvement changes: no  Home health supplies by type and quantity ordered/delivered this visit include: no    Clinician asked if patient has had any physician contact since last home care visit and patient states: no  Clinician asked if patient has any new or changed medications and patient states:  no  If Yes, were medications reconciled? yes  Was the certifying physician notified of changes in medications? na    Clinical assessment (what this visit means for the patient overall and need for ongoing skilled care) and progress or lack of progress towards SPECIFIC goals: Patient's PLOF was independent for mobility and ADLs. Patient's CLOF is HAYDEN to min assist for mobility using cane and min assist for ADLs. Patient is having difficulty ambulating, transferring and performing ADLs. patient is benefit from continued PT 2w4 in order to acheive goals and regain independence.        Written Teaching Material Utilized: written copy of exercises given to patient     Interdisciplinary communication with: OT    Discharge planning as follows: patient will be discharged to care of family under supervision of MD when PT goals are met     Specific plan for next visit: progress gait and exercises

## 2023-07-13 ENCOUNTER — HOME CARE VISIT (OUTPATIENT)
Facility: HOME HEALTH | Age: 83
End: 2023-07-13

## 2023-07-13 VITALS
HEART RATE: 76 BPM | TEMPERATURE: 98.1 F | SYSTOLIC BLOOD PRESSURE: 110 MMHG | DIASTOLIC BLOOD PRESSURE: 60 MMHG | OXYGEN SATURATION: 98 % | RESPIRATION RATE: 17 BRPM

## 2023-07-13 VITALS
HEART RATE: 82 BPM | OXYGEN SATURATION: 95 % | SYSTOLIC BLOOD PRESSURE: 100 MMHG | TEMPERATURE: 97.3 F | RESPIRATION RATE: 18 BRPM | DIASTOLIC BLOOD PRESSURE: 60 MMHG

## 2023-07-13 LAB
BACTERIA SPEC CULT: NORMAL
BACTERIA SPEC CULT: NORMAL
SERVICE CMNT-IMP: NORMAL
SERVICE CMNT-IMP: NORMAL

## 2023-07-13 PROCEDURE — G0151 HHCP-SERV OF PT,EA 15 MIN: HCPCS

## 2023-07-13 PROCEDURE — G0299 HHS/HOSPICE OF RN EA 15 MIN: HCPCS

## 2023-07-13 ASSESSMENT — ENCOUNTER SYMPTOMS: HEMOPTYSIS: 0

## 2023-07-13 NOTE — HOME HEALTH
82 y/o male who was recently rehospitalized due to hypotension. Pt has also has multiple falls at home and hit head on a recent fall. PMH: [place]. Pt lives in a multi level home with family. Bedroom and bathroom are on the 2nd level. pt uses a cane occassionally. Diagnosis: hypotension    Subjective: Pt states he is feeling much better since returning home. Caregiver:  Pita Storm- son lives across street, Mami Lively and wife are in the home. Wife has her oen health issues. Caregivers assist with all parts of care. Medications reconciled and all medications are available in the home this visit. The following education was provided regarding medications: medication interactions and look alike medications: Patient/CG able to demonstrate knowledge through teach back with 95 percent accuracy. Medications are effective at this time. Patient/caregiver instructed on plan of care and are agreeable to plan of care at this time. Patient at risk for falls Yes:   Recommended requesting PT/OT orders due to fall risk YES:   Patient response to recommended requesting of PT/OT orders: agreeable    Discharge planning discussed with patient and caregiver. Discharge planning as follows: When goals of disease process education completed and therapy goals met    Patient/caregiver did verbalize agreement with discharge planning. Clinical Assessment: Irritated through eval. denies needing assistance with care despite caregivers stating he does and pt has balance impairments putting him at risk for additional falls. previously mod I.  patient presents at high risk of falls (MAHC-10 = 8), and mod A for safe ADL completion (Modified Barthel Index = 60/100) during OT Eval. He is currently unsafe with ambulating around the home due to not wanting to use AD and with functional transfers due to needing 3:1 commode or versa frame aroudn toilet.  Also instructed on needing shower chair to reduce fall risk in the

## 2023-07-14 VITALS
HEART RATE: 84 BPM | DIASTOLIC BLOOD PRESSURE: 62 MMHG | SYSTOLIC BLOOD PRESSURE: 94 MMHG | RESPIRATION RATE: 16 BRPM | WEIGHT: 163 LBS | BODY MASS INDEX: 24.07 KG/M2 | TEMPERATURE: 98.2 F | OXYGEN SATURATION: 93 %

## 2023-07-14 NOTE — HOME HEALTH
Subjective: Patient reports that he is doing well  Falls since last visit - no  Caregiver involvement changes: no  Home health supplies by type and quantity ordered/delivered this visit include: no    Clinician asked if patient has had any physician contact since last home care visit and patient states- no  Clinician asked if patient has any new or changed medications and patient states:  no  If Yes, were medications reconciled? yes  Was the certifying physician notified of changes in medications? na    Clinical assessment (what this visit means for the patient overall and need for ongoing skilled care) and progress or lack of progress towards SPECIFIC goals: Todays treatment focused on progression of HEP and increased ambulation distance. Patient was able to make progress with PT by increasing gait distance and progressing ther ex but patient did require hands on assist for gait in order to prevent falls.  Patient will benefit from continued PT in order to acheive gait goals and progress to stairs and outdoor ambulation     Written Teaching Material Utilized- written copy of exercises given to patient     Interdisciplinary communication with: OT    Discharge planning as follows: reassess at the end of the orders     Specific plan for next visit: progress to stairs and increase ambulation distance

## 2023-07-14 NOTE — HOME HEALTH
Subjective: \"I am checking my weight, and I want to drive. \"  Falls since last visit No(if yes complete the Fall Tracking Form and include bsrifallreport):   Caregiver involvement changes: NO  Home health supplies by type and quantity ordered/delivered this visit include: N/A    Clinician asked if patient has had any physician contact since last home care visit and patient states: NO  Clinician asked if patient has any new or changed medications and patient states:  NO   If Yes, were medications reconciled? N/A   Was the certifying physician notified of changes in medications? N/A     Clinical assessment (what this visit means for the patient overall and need for ongoing skilled care) and progress or lack of progress towards SPECIFIC goals: Patient reported that he wants to go driving, SN encouraged patient not to drive and family to drive as needed due to blood pressure could result in dizziness and unsafe while driving. Patient is weighing daily    Written Teaching Material Utilized: Medication list    Interdisciplinary communication with: MADELAINE Jensen NP; Lashanda Lewis, PT; Dominic Boogie OT   for the purpose of POC collaboration    Discharge planning as follows:  When goals are met    Specific plan for next visit: Educate on cardiac disease process

## 2023-07-17 ENCOUNTER — HOME CARE VISIT (OUTPATIENT)
Facility: HOME HEALTH | Age: 83
End: 2023-07-17

## 2023-07-17 ASSESSMENT — ENCOUNTER SYMPTOMS: HEMOPTYSIS: 0

## 2023-07-18 ENCOUNTER — HOME CARE VISIT (OUTPATIENT)
Facility: HOME HEALTH | Age: 83
End: 2023-07-18
Payer: MEDICARE

## 2023-07-18 ENCOUNTER — HOME CARE VISIT (OUTPATIENT)
Facility: HOME HEALTH | Age: 83
End: 2023-07-18

## 2023-07-18 VITALS
OXYGEN SATURATION: 96 % | TEMPERATURE: 97.3 F | RESPIRATION RATE: 16 BRPM | DIASTOLIC BLOOD PRESSURE: 60 MMHG | WEIGHT: 163 LBS | HEART RATE: 78 BPM | BODY MASS INDEX: 24.07 KG/M2 | SYSTOLIC BLOOD PRESSURE: 110 MMHG

## 2023-07-18 PROCEDURE — G0300 HHS/HOSPICE OF LPN EA 15 MIN: HCPCS

## 2023-07-18 PROCEDURE — G0152 HHCP-SERV OF OT,EA 15 MIN: HCPCS

## 2023-07-19 ENCOUNTER — HOME CARE VISIT (OUTPATIENT)
Facility: HOME HEALTH | Age: 83
End: 2023-07-19

## 2023-07-19 VITALS
SYSTOLIC BLOOD PRESSURE: 110 MMHG | OXYGEN SATURATION: 96 % | HEART RATE: 78 BPM | TEMPERATURE: 97.3 F | DIASTOLIC BLOOD PRESSURE: 60 MMHG | RESPIRATION RATE: 16 BRPM

## 2023-07-19 VITALS
RESPIRATION RATE: 17 BRPM | OXYGEN SATURATION: 98 % | TEMPERATURE: 97.2 F | HEART RATE: 87 BPM | SYSTOLIC BLOOD PRESSURE: 109 MMHG | DIASTOLIC BLOOD PRESSURE: 60 MMHG

## 2023-07-19 PROCEDURE — G0151 HHCP-SERV OF PT,EA 15 MIN: HCPCS

## 2023-07-19 ASSESSMENT — ENCOUNTER SYMPTOMS: HEMOPTYSIS: 0

## 2023-07-19 NOTE — HOME HEALTH
Subjective: I am doing better. I have a little more energy. Falls since last visit No(if yes complete the Fall Tracking Form and include bsrifallreport):   Caregiver involvement changes: no changes  Home health supplies by type and quantity ordered/delivered this visit include: NA    Clinician asked if patient has had any physician contact since last home care visit and patient states: NO  Clinician asked if patient has any new or changed medications and patient states:  NO   If Yes, were medications reconciled? NO  Was the certifying physician notified of changes in medications? NO     Clinical assessment (what this visit means for the patient overall and need for ongoing skilled care) and progress or lack of progress towards SPECIFIC goals: OT sessions focused on edu, HEP and functional mobility needed for ADLs. BP stable this date. reinforced importance of movement, diet and water intake to maintain BP. pt decliens steps to access shower this date but was able to engage in functional task for 12 min without rest to engage in simple IADL task. reinfoced with pt UE HEP. continued still OT is needed to improve safetya dn [place]. Written Teaching Material Utilized: N/A    Interdisciplinary communication with: Abraham Doshi PT for the purpose of POC collaboration    Discharge planning as follows:  Will discharge when the patient has reached their maximum functional potential and maximum safety in their home and When goals are met    Specific plan for next visit: Re-instruct patient/caregiver on safety, ADL training

## 2023-07-20 ENCOUNTER — HOME CARE VISIT (OUTPATIENT)
Facility: HOME HEALTH | Age: 83
End: 2023-07-20
Payer: MEDICARE

## 2023-07-20 VITALS
TEMPERATURE: 97.7 F | SYSTOLIC BLOOD PRESSURE: 108 MMHG | OXYGEN SATURATION: 95 % | RESPIRATION RATE: 18 BRPM | BODY MASS INDEX: 24.22 KG/M2 | HEART RATE: 68 BPM | DIASTOLIC BLOOD PRESSURE: 60 MMHG | WEIGHT: 164 LBS

## 2023-07-20 PROCEDURE — G0300 HHS/HOSPICE OF LPN EA 15 MIN: HCPCS

## 2023-07-20 ASSESSMENT — ENCOUNTER SYMPTOMS: CONSTIPATION: 1

## 2023-07-20 NOTE — HOME HEALTH
Subjective: Patient reports that he is feeling well   Falls since last visit - no  Caregiver involvement changes: no  Home health supplies by type and quantity ordered/delivered this visit include: no    Clinician asked if patient has had any physician contact since last home care visit and patient states: no  Clinician asked if patient has any new or changed medications and patient states:  no  If Yes, were medications reconciled? yes  Was the certifying physician notified of changes in medications? na    Clinical assessment (what this visit means for the patient overall and need for ongoing skilled care) and progress or lack of progress towards SPECIFIC goals: Todays treatment focused on progression to exercises and stair navigation.  Patient was able to progress to new standing ther ex and stairs without difficulty today but required hands on assist for safety and will require further PT in order to acheive gait goals     Written Teaching Material Utilized: written copy of exercises given to patient     Interdisciplinary communication with: OT    Discharge planning as follows: reassess next week    Specific plan for next visit: progress to outdoor ambulation

## 2023-07-24 ENCOUNTER — HOME CARE VISIT (OUTPATIENT)
Facility: HOME HEALTH | Age: 83
End: 2023-07-24
Payer: MEDICARE

## 2023-07-24 VITALS
TEMPERATURE: 98.1 F | OXYGEN SATURATION: 97 % | HEART RATE: 70 BPM | RESPIRATION RATE: 17 BRPM | SYSTOLIC BLOOD PRESSURE: 104 MMHG | DIASTOLIC BLOOD PRESSURE: 67 MMHG

## 2023-07-24 PROCEDURE — G0151 HHCP-SERV OF PT,EA 15 MIN: HCPCS

## 2023-07-24 PROCEDURE — G0152 HHCP-SERV OF OT,EA 15 MIN: HCPCS

## 2023-07-24 PROCEDURE — G0300 HHS/HOSPICE OF LPN EA 15 MIN: HCPCS

## 2023-07-24 ASSESSMENT — ENCOUNTER SYMPTOMS: HEMOPTYSIS: 0

## 2023-07-25 VITALS
OXYGEN SATURATION: 95 % | HEART RATE: 102 BPM | TEMPERATURE: 97.4 F | DIASTOLIC BLOOD PRESSURE: 58 MMHG | SYSTOLIC BLOOD PRESSURE: 100 MMHG | RESPIRATION RATE: 18 BRPM

## 2023-07-25 VITALS
SYSTOLIC BLOOD PRESSURE: 105 MMHG | OXYGEN SATURATION: 97 % | HEART RATE: 82 BPM | DIASTOLIC BLOOD PRESSURE: 70 MMHG | TEMPERATURE: 97 F | RESPIRATION RATE: 16 BRPM

## 2023-07-25 NOTE — HOME HEALTH
Subjective: Patient reports that he is doing well today  Falls since last visit - no  Caregiver involvement changes: no  Home health supplies by type and quantity ordered/delivered this visit include: no    Clinician asked if patient has had any physician contact since last home care visit and patient states:no  Clinician asked if patient has any new or changed medications and patient states: no  If Yes, were medications reconciled? yes  Was the certifying physician notified of changes in medications? na    Clinical assessment (what this visit means for the patient overall and need for ongoing skilled care) and progress or lack of progress towards SPECIFIC goals: Todays treatment focused on progression of new standing and balance ther ex and increase ambulation distance. Patient is making progress with PT and is becomming independent with his transfers, Patient demonstrates compliance with cane use and was able to perform standing ther ex with minimal rest breaks.  Patient did require hands on assist on stairs and for standing ther ex for safety and will requrie one additional PT visit to ensure goals are met and gait and balance goals are met     Written Teaching Material Utilized: written copy of exercises given to patient     Interdisciplinary communication with: OT   Discharge planning as follows: discharge planned for next visit     Specific plan for next visit: discharge planned for next visit

## 2023-07-25 NOTE — HOME HEALTH
Subjective: I am doing well. I dont have any pain. Falls since last visit No(if yes complete the Fall Tracking Form and include bsrifallreport):   Caregiver involvement changes: no changes  Home health supplies by type and quantity ordered/delivered this visit include: NA    Clinician asked if patient has had any physician contact since last home care visit and patient states: NO  Clinician asked if patient has any new or changed medications and patient states:  NO   If Yes, were medications reconciled? NO   Was the certifying physician notified of changes in medications? NO     Clinical assessment (what this visit means for the patient over all and need for ongoing skilled care) and progress or lack of progress towards SPECIFIC goals: OT session focused on home safety, transfers and safety. pt ambulated outside using SPC and SBA for safety. pt instructed on purchasing versa frame to improve toilet transfers. pt currently uses door frame and vanity to pull self up. overall, pt is progressing well at this time with currently POC. Written Teaching Material Utilized: N/A    Interdisciplinary communication with: Brenda Carpio PT for the purpose of POC collaboration    Discharge planning as follows: Will discharge when the patient has reached their maximum functional potential and maximum safety in their home and When goals are met    Specific plan for next visit: Re-instruct patient/caregiver on ADL training, home safety, transfer training.

## 2023-07-26 ENCOUNTER — HOME CARE VISIT (OUTPATIENT)
Facility: HOME HEALTH | Age: 83
End: 2023-07-26
Payer: MEDICARE

## 2023-07-26 VITALS
DIASTOLIC BLOOD PRESSURE: 62 MMHG | RESPIRATION RATE: 17 BRPM | SYSTOLIC BLOOD PRESSURE: 114 MMHG | OXYGEN SATURATION: 96 % | HEART RATE: 64 BPM

## 2023-07-26 PROCEDURE — G0152 HHCP-SERV OF OT,EA 15 MIN: HCPCS

## 2023-07-26 PROCEDURE — G0151 HHCP-SERV OF PT,EA 15 MIN: HCPCS

## 2023-07-26 ASSESSMENT — ENCOUNTER SYMPTOMS: HEMOPTYSIS: 0

## 2023-07-26 NOTE — HOME HEALTH
Subjective: I'm fine (per pt - per family, he continues to be very dependant)  Falls since last visit No(if yes complete the Fall Tracking Form and include bsrifallreport):   Caregiver involvement changes: no  Home health supplies by type and quantity ordered/delivered this visit include: no    Clinician asked if patient has had any physician contact since last home care visit and patient states: NO  Clinician asked if patient has any new or changed medications and patient states:  NO   If Yes, were medications reconciled? NO   Was the certifying physician notified of changes in medications? NO     Clinical assessment (what this visit means for the patient overall and need for ongoing skilled care) and progress or lack of progress towards SPECIFIC goals: pt with hypotension now vs HTN. Family encouraging pt to be more independant, but he is very stagnant and unwilling. SN for further assess/monitor/teach cardiac    Written Teaching Material Utilized: N/A    Interdisciplinary communication with: N/A for the purpose of na    Discharge planning as follows:  Will discharge when the patient has reached their maximum functional potential and maximum safety in their home    Specific plan for next visit: continue to encourage pt to be more independant,

## 2023-07-26 NOTE — HOME HEALTH
Subjective: daughter attempting to speak with this nurse, but pt is yelling at her in their language and wanting her to leave the room. Pt calling daughter the devil and states she is a bad one, but that his sons are his life and amazing  Falls since last visit No(if yes complete the Fall Tracking Form and include bsrifallreport):   Caregiver involvement changes: challenging family dynamics  Home health supplies by type and quantity ordered/delivered this visit include: no    Clinician asked if patient has had any physician contact since last home care visit and patient states: NO  Clinician asked if patient has any new or changed medications and patient states:  NO   If Yes, were medications reconciled? NO   Was the certifying physician notified of changes in medications? NO     Clinical assessment (what this visit means for the patient overall and need for ongoing skilled care) and progress or lack of progress towards SPECIFIC goals: pt with HTN and other comorbidities. Pt bp low now. Pt not drinking or eating per family and they are very concerned. Daughter states pt is very needy and will not do for himself. This nurse asked if pt had given up and was depressed? Daughter feels that may be a possibility and will discuss with physician. SN for further cardio/pulmonary assess/monitor/teach    Written Teaching Material Utilized: N/A    Interdisciplinary communication with: N/A for the purpose of na    Discharge planning as follows: Will discharge when the patient has reached their maximum functional potential and maximum safety in their home    Specific plan for next visit: cardio/pulmonary assess/monitor/teach.  Dietary assess/monitor and teach

## 2023-07-27 VITALS
OXYGEN SATURATION: 98 % | DIASTOLIC BLOOD PRESSURE: 67 MMHG | SYSTOLIC BLOOD PRESSURE: 115 MMHG | TEMPERATURE: 98 F | HEART RATE: 78 BPM | RESPIRATION RATE: 18 BRPM

## 2023-07-27 NOTE — HOME HEALTH
Subjective: I feel good. Falls since last visit No(if yes complete the Fall Tracking Form and include bsrifallreport):   Caregiver involvement changes: no changes  Home health supplies by type and quantity ordered/delivered this visit include: N    Clinician asked if patient has had any physician contact since last home care visit and patient states: NO  Clinician asked if patient has any new or changed medications and patient states:  NO   If Yes, were medications reconciled? NO   Was the certifying physician notified of changes in medications?  N/A     Clinical assessment (what this visit means for the patient overall and need for ongoing skilled care) and progress or lack of progress towards SPECIFIC goals: see d/c summary    Written Teaching Material Utilized: d.c summary    Interdisciplinary communication with:LYDIA

## 2023-07-27 NOTE — HOME HEALTH
Subjective: Patient reports he would like discharge from PT   Falls since last visit - no  Caregiver involvement changes: no  Home health supplies by type and quantity ordered/delivered this visit include: no    Clinician asked if patient has had any physician contact since last home care visit and patient states: no  Clinician asked if patient has any new or changed medications and patient states:  no  If Yes, were medications reconciled? yes  Was the certifying physician notified of changes in medications? na    Clinical assessment (what this visit means for the patient overall and need for ongoing skilled care) and progress or lack of progress towards SPECIFIC goals: Patient has been seen by PT for the past 3 weeks. PT sessions have included progression of HEP, ther ex, transfers, bed mobility, gait training, balance re ed, stairs and patient and caregiver education. Patient has been able to make progress towards goals and is now mod independent for mobility. Patient has met PT goals and plans to continue his exercises and his walking on his own. Patient is discharged from PT with goals met.     Written Teaching Material Utilized: written copy of exercises given to patient     Interdisciplinary communication with: OT, SN, spoke about PT discharge at [facility]    Discharge planning as follows: discharge     Specific plan for next visit: discharge, SN to continue

## 2023-07-28 ENCOUNTER — HOME CARE VISIT (OUTPATIENT)
Facility: HOME HEALTH | Age: 83
End: 2023-07-28
Payer: MEDICARE

## 2023-07-28 VITALS
SYSTOLIC BLOOD PRESSURE: 123 MMHG | RESPIRATION RATE: 18 BRPM | TEMPERATURE: 98.1 F | DIASTOLIC BLOOD PRESSURE: 74 MMHG | HEART RATE: 88 BPM | OXYGEN SATURATION: 98 %

## 2023-07-28 PROCEDURE — G0299 HHS/HOSPICE OF RN EA 15 MIN: HCPCS

## 2023-07-28 ASSESSMENT — ENCOUNTER SYMPTOMS: STOOL DESCRIPTION: FORMED

## 2023-07-31 ENCOUNTER — OFFICE VISIT (OUTPATIENT)
Age: 83
End: 2023-07-31
Payer: MEDICARE

## 2023-07-31 VITALS
HEART RATE: 103 BPM | BODY MASS INDEX: 23.11 KG/M2 | HEIGHT: 69 IN | OXYGEN SATURATION: 96 % | WEIGHT: 156 LBS | DIASTOLIC BLOOD PRESSURE: 70 MMHG | SYSTOLIC BLOOD PRESSURE: 100 MMHG | RESPIRATION RATE: 16 BRPM

## 2023-07-31 DIAGNOSIS — Z91.89 DRIVING SAFETY ISSUE: ICD-10-CM

## 2023-07-31 DIAGNOSIS — I10 ESSENTIAL (PRIMARY) HYPERTENSION: ICD-10-CM

## 2023-07-31 DIAGNOSIS — Z91.81 AT HIGH RISK FOR FALLS: ICD-10-CM

## 2023-07-31 DIAGNOSIS — Z09 FOLLOW-UP EXAM: Primary | ICD-10-CM

## 2023-07-31 PROCEDURE — 1111F DSCHRG MED/CURRENT MED MERGE: CPT | Performed by: INTERNAL MEDICINE

## 2023-07-31 PROCEDURE — 3078F DIAST BP <80 MM HG: CPT | Performed by: INTERNAL MEDICINE

## 2023-07-31 PROCEDURE — 3074F SYST BP LT 130 MM HG: CPT | Performed by: INTERNAL MEDICINE

## 2023-07-31 PROCEDURE — G8427 DOCREV CUR MEDS BY ELIG CLIN: HCPCS | Performed by: INTERNAL MEDICINE

## 2023-07-31 PROCEDURE — 99213 OFFICE O/P EST LOW 20 MIN: CPT | Performed by: INTERNAL MEDICINE

## 2023-07-31 PROCEDURE — 1036F TOBACCO NON-USER: CPT | Performed by: INTERNAL MEDICINE

## 2023-07-31 PROCEDURE — 1123F ACP DISCUSS/DSCN MKR DOCD: CPT | Performed by: INTERNAL MEDICINE

## 2023-07-31 PROCEDURE — G8420 CALC BMI NORM PARAMETERS: HCPCS | Performed by: INTERNAL MEDICINE

## 2023-07-31 ASSESSMENT — ENCOUNTER SYMPTOMS
BACK PAIN: 0
GASTROINTESTINAL NEGATIVE: 1
RESPIRATORY NEGATIVE: 1

## 2023-07-31 NOTE — PROGRESS NOTES
ADVISED PATIENT OF THE FOLLOWING HEALTH MAINTAINCE DUE  Health Maintenance Due   Topic Date Due    Shingles vaccine (1 of 2) Never done    Pneumococcal 65+ years Vaccine (1 - PCV) Never done    COVID-19 Vaccine (3 - Booster for Pfizer series) 05/05/2021    DTaP/Tdap/Td vaccine (1 - Tdap) 11/25/2022      Chief Complaint   Patient presents with    Follow-up       1. \"Have you been to the ER, urgent care clinic since your last visit? Hospitalized since your last visit? \" No    2. \"Have you seen or consulted any other health care providers outside of the [address] [place] [address] since your last visit? \" No     3. For patients aged 43-73: Has the patient had a colonoscopy / FIT/ Cologuard? NA      If the patient is female:    4. For patients aged 43-66: Has the patient had a mammogram within the past 2 years? NA      5. For patients aged 21-65: Has the patient had a pap smear?  NA

## 2023-07-31 NOTE — HOME HEALTH
Subjective: I'm fine (per pt - per family, he continues to be very dependant  Falls since last visit No(if yes complete the Fall Tracking Form and include bsrifallreport):   Caregiver involvement changes: no  Home health supplies by type and quantity ordered/delivered this visit include: no    Clinician asked if patient has had any physician contact since last home care visit and patient states: NO  Clinician asked if patient has any new or changed medications and patient states:  NO   If Yes, were medications reconciled? NO   Was the certifying physician notified of changes in medications? NO     Clinical assessment (what this visit means for the patient overall and need for ongoing skilled care) and progress or lack of progress towards SPECIFIC goals: pt with hypotension now vs HTN. Family encouraging pt to be more independant, but he is very stagnant and unwilling. SN for further assess/monitor/teach cardiac    Written Teaching Material Utilized: N/A  Interdisciplinary communication with: N/A for the purpose of na    Discharge planning as follows:  Will discharge when the patient has reached their maximum functional potential and maximum safety in their home  Specific plan for next visit: continue to encourage pt to be more independant,

## 2023-07-31 NOTE — PROGRESS NOTES
Elisabeth Dillard is a 81 y.o. male who presents today for the following: patient was seen with his wife and son. Wants to be sure he can go back to driving. No one stopped him, he just stopped after he began to have falls. Patient has now went through PT and OT. Notes have been read. He is now bathing, and dressing himself. Wears glasses. All deny concerns with his memory or visual changes. Last fall was a few weeks ago. Was going up the stairs and holding things in his hands. Was not able to grab the railing and lost his balance. No injury and often uses a can for support     Chief Complaint   Patient presents with    Follow-up           PMH/PSH/Allergies/Social History/medication list and most recent studies reviewed with patient. reports that he quit smoking about 8 months ago. His smoking use included cigarettes. He started smoking about 59 years ago. He has a 29.00 pack-year smoking history. He has been exposed to tobacco smoke. He has never used smokeless tobacco.    reports no history of alcohol use. Vitals:    07/31/23 0953   BP: 100/70   Pulse: (!) 103   Resp: 16   SpO2: 96%      Past Medical History:   Diagnosis Date    Bladder cancer ([facility])     CAD (coronary artery disease)     MI 2004    Cancer [facility])     prostate s/p xrt    Macular degeneration     MI (myocardial infarction) ([address] ) 01/01/2004       Allergies   Allergen Reactions    Latex Other (See Comments)     Blistering of the skin        Current Outpatient Medications on File Prior to Visit   Medication Sig Dispense Refill    Misc Natural Products (COMPLETE PROSTATE HEALTH PO) Take 1 tablet by mouth daily.       Darolutamide (NUBEQA) 300 MG TABS Take 2 tablets by mouth with breakfast and with evening meal.      aspirin 81 MG EC tablet Take 1 tablet by mouth daily      Multiple Vitamin (MULTIVITAMIN ADULT PO) Take 1 tablet by mouth daily      bisacodyl (DULCOLAX) 5 MG EC tablet Take 1 tablet by mouth daily      Saw Palmetto 160 MG

## 2024-06-14 ENCOUNTER — APPOINTMENT (OUTPATIENT)
Facility: HOSPITAL | Age: 84
DRG: 069 | End: 2024-06-14
Payer: MEDICARE

## 2024-06-14 ENCOUNTER — HOSPITAL ENCOUNTER (INPATIENT)
Facility: HOSPITAL | Age: 84
LOS: 3 days | Discharge: HOME | DRG: 069 | End: 2024-06-17
Attending: STUDENT IN AN ORGANIZED HEALTH CARE EDUCATION/TRAINING PROGRAM | Admitting: INTERNAL MEDICINE
Payer: MEDICARE

## 2024-06-14 DIAGNOSIS — I63.9 CEREBROVASCULAR ACCIDENT (CVA), UNSPECIFIED MECHANISM (HCC): Primary | ICD-10-CM

## 2024-06-14 DIAGNOSIS — I63.9 ACUTE CVA (CEREBROVASCULAR ACCIDENT) (HCC): ICD-10-CM

## 2024-06-14 LAB
ALBUMIN SERPL-MCNC: 3.9 G/DL (ref 3.5–5)
ALBUMIN/GLOB SERPL: 0.9 (ref 1.1–2.2)
ALP SERPL-CCNC: 96 U/L (ref 45–117)
ALT SERPL-CCNC: 14 U/L (ref 12–78)
ANION GAP SERPL CALC-SCNC: 3 MMOL/L (ref 5–15)
AST SERPL-CCNC: 12 U/L (ref 15–37)
BASOPHILS # BLD: 0.1 K/UL (ref 0–0.1)
BASOPHILS NFR BLD: 1 % (ref 0–1)
BILIRUB SERPL-MCNC: 0.4 MG/DL (ref 0.2–1)
BUN SERPL-MCNC: 28 MG/DL (ref 6–20)
BUN/CREAT SERPL: 20 (ref 12–20)
CALCIUM SERPL-MCNC: 10.4 MG/DL (ref 8.5–10.1)
CHLORIDE SERPL-SCNC: 111 MMOL/L (ref 97–108)
CO2 SERPL-SCNC: 25 MMOL/L (ref 21–32)
COMMENT:: NORMAL
CREAT SERPL-MCNC: 1.43 MG/DL (ref 0.7–1.3)
DIFFERENTIAL METHOD BLD: NORMAL
EOSINOPHIL # BLD: 0.3 K/UL (ref 0–0.4)
ERYTHROCYTE [DISTWIDTH] IN BLOOD BY AUTOMATED COUNT: 13.5 % (ref 11.5–14.5)
GLOBULIN SER CALC-MCNC: 4.2 G/DL (ref 2–4)
GLUCOSE BLD STRIP.AUTO-MCNC: 96 MG/DL (ref 65–117)
GLUCOSE SERPL-MCNC: 106 MG/DL (ref 65–100)
HCT VFR BLD AUTO: 43.1 % (ref 36.6–50.3)
IMM GRANULOCYTES # BLD AUTO: 0 K/UL (ref 0–0.04)
IMM GRANULOCYTES NFR BLD AUTO: 0 % (ref 0–0.5)
INR PPP: 1 (ref 0.9–1.1)
LYMPHOCYTES # BLD: 2.8 K/UL (ref 0.8–3.5)
LYMPHOCYTES NFR BLD: 30 % (ref 12–49)
MCH RBC QN AUTO: 31.7 PG (ref 26–34)
MCHC RBC AUTO-ENTMCNC: 32.7 G/DL (ref 30–36.5)
MCV RBC AUTO: 96.9 FL (ref 80–99)
MONOCYTES # BLD: 0.5 K/UL (ref 0–1)
MONOCYTES NFR BLD: 6 % (ref 5–13)
NEUTS SEG # BLD: 5.6 K/UL (ref 1.8–8)
NEUTS SEG NFR BLD: 60 % (ref 32–75)
NRBC # BLD: 0 K/UL (ref 0–0.01)
PLATELET # BLD AUTO: 219 K/UL (ref 150–400)
PMV BLD AUTO: 10.1 FL (ref 8.9–12.9)
PROTHROMBIN TIME: 10.8 SEC (ref 9–11.1)
RBC # BLD AUTO: 4.45 M/UL (ref 4.1–5.7)
SERVICE CMNT-IMP: NORMAL
SODIUM SERPL-SCNC: 139 MMOL/L (ref 136–145)
SPECIMEN HOLD: NORMAL
TROPONIN I SERPL HS-MCNC: 8 NG/L (ref 0–76)
WBC # BLD AUTO: 9.3 K/UL (ref 4.1–11.1)

## 2024-06-14 PROCEDURE — 99285 EMERGENCY DEPT VISIT HI MDM: CPT

## 2024-06-14 PROCEDURE — 0042T CT BRAIN PERFUSION: CPT

## 2024-06-14 PROCEDURE — 70450 CT HEAD/BRAIN W/O DYE: CPT

## 2024-06-14 PROCEDURE — 6370000000 HC RX 637 (ALT 250 FOR IP): Performed by: STUDENT IN AN ORGANIZED HEALTH CARE EDUCATION/TRAINING PROGRAM

## 2024-06-14 PROCEDURE — 85025 COMPLETE CBC W/AUTO DIFF WBC: CPT

## 2024-06-14 PROCEDURE — 84484 ASSAY OF TROPONIN QUANT: CPT

## 2024-06-14 PROCEDURE — 4A03X5D MEASUREMENT OF ARTERIAL FLOW, INTRACRANIAL, EXTERNAL APPROACH: ICD-10-PCS | Performed by: STUDENT IN AN ORGANIZED HEALTH CARE EDUCATION/TRAINING PROGRAM

## 2024-06-14 PROCEDURE — 80053 COMPREHEN METABOLIC PANEL: CPT

## 2024-06-14 PROCEDURE — 85610 PROTHROMBIN TIME: CPT

## 2024-06-14 PROCEDURE — 36415 COLL VENOUS BLD VENIPUNCTURE: CPT

## 2024-06-14 PROCEDURE — 93005 ELECTROCARDIOGRAM TRACING: CPT | Performed by: STUDENT IN AN ORGANIZED HEALTH CARE EDUCATION/TRAINING PROGRAM

## 2024-06-14 PROCEDURE — 82962 GLUCOSE BLOOD TEST: CPT

## 2024-06-14 PROCEDURE — 2060000000 HC ICU INTERMEDIATE R&B

## 2024-06-14 PROCEDURE — 6360000004 HC RX CONTRAST MEDICATION: Performed by: RADIOLOGY

## 2024-06-14 PROCEDURE — 70498 CT ANGIOGRAPHY NECK: CPT

## 2024-06-14 RX ORDER — CLOPIDOGREL 300 MG/1
300 TABLET, FILM COATED ORAL ONCE
Status: COMPLETED | OUTPATIENT
Start: 2024-06-14 | End: 2024-06-14

## 2024-06-14 RX ORDER — ASPIRIN 81 MG/1
81 TABLET, CHEWABLE ORAL ONCE
Status: COMPLETED | OUTPATIENT
Start: 2024-06-14 | End: 2024-06-14

## 2024-06-14 RX ADMIN — ASPIRIN 81 MG CHEWABLE TABLET 81 MG: 81 TABLET CHEWABLE at 18:53

## 2024-06-14 RX ADMIN — CLOPIDOGREL BISULFATE 300 MG: 300 TABLET, FILM COATED ORAL at 18:52

## 2024-06-14 RX ADMIN — IOPAMIDOL 100 ML: 755 INJECTION, SOLUTION INTRAVENOUS at 18:22

## 2024-06-14 RX ADMIN — IOPAMIDOL 20 ML: 755 INJECTION, SOLUTION INTRAVENOUS at 18:37

## 2024-06-14 ASSESSMENT — PAIN - FUNCTIONAL ASSESSMENT: PAIN_FUNCTIONAL_ASSESSMENT: NONE - DENIES PAIN

## 2024-06-14 NOTE — ED TRIAGE NOTES
Patient arrives to ER with c/o right arm weakness and eyelid/mouth droop. Says the arm weakness started at 0400 and eyelid/mouth droop started at 1500. BG 96    Dr. Perez in triage. Code stroke level 2 called

## 2024-06-14 NOTE — ED PROVIDER NOTES
[facility] EMERGENCY DEP  EMERGENCY DEPARTMENT ENCOUNTER      Pt Name: Jose Fuentes  MRN: [id number]  Birthdate 1940  Date of evaluation: 6/14/2024  Provider: Bethanie Perez MD    CHIEF COMPLAINT       Chief Complaint   Patient presents with    Extremity Weakness    Facial Droop     HISTORY OF PRESENT ILLNESS   (Location/Symptom, Timing/Onset, Context/Setting, Quality, Duration, Modifying Factors, Severity)  Note limiting factors.   HPI  84-year-old male with past medical history of CAD status post PCI x 2, bladder cancer, presents to the ER for evaluation of right hand weakness since 4AM this morning. Pt reports symptoms came on suddenly, then seemed to transiently recover, before becoming weak once again and has now been persistently weak. States he has been unable to  or hold on to anything throughout the day. Family also noted some \"eye droopiness\" on the R sided when they first saw him earlier in the day (states this is currently improved), along with a mild R lower facial droop as well. Patient denies any headaches, vision changes, confusion, speech deficits, weakness, numbness elsewhere, gait disturbances/ataxia, or incoordination.     Review of External Medical Records:     Nursing Notes were reviewed.    REVIEW OF SYSTEMS    (2-9 systems for level 4, 10 or more for level 5)     Review of Systems   All other systems reviewed and are negative.      Except as noted above the remainder of the review of systems was reviewed and negative.       PAST MEDICAL HISTORY     Past Medical History:   Diagnosis Date    Bladder cancer (H[facility])     CAD (coronary artery disease)     MI 2004    Cancer ([facility])     prostate s/p xrt    Macular degeneration     MI (myocardial infarction) ([facility]) 01/01/2004         SURGICAL HISTORY       Past Surgical History:   Procedure Laterality Date    APPENDECTOMY  1955    CATARACT REMOVAL      bilateral    [place] UNLISTED PROCEDURE CARDIAC SURGERY      2 Stents    UROLOGICAL SURGERY  6/24/15

## 2024-06-15 ENCOUNTER — APPOINTMENT (OUTPATIENT)
Facility: HOSPITAL | Age: 84
DRG: 069 | End: 2024-06-15
Payer: MEDICARE

## 2024-06-15 LAB
ALBUMIN SERPL-MCNC: 3.6 G/DL (ref 3.5–5)
ALBUMIN/GLOB SERPL: 1.1 (ref 1.1–2.2)
ALP SERPL-CCNC: 87 U/L (ref 45–117)
ALT SERPL-CCNC: 16 U/L (ref 12–78)
ANION GAP SERPL CALC-SCNC: 9 MMOL/L (ref 5–15)
APPEARANCE UR: ABNORMAL
AST SERPL-CCNC: 8 U/L (ref 15–37)
BACTERIA URNS QL MICRO: ABNORMAL /HPF
BASOPHILS # BLD: 0.1 K/UL (ref 0–0.1)
BASOPHILS NFR BLD: 1 % (ref 0–1)
BILIRUB SERPL-MCNC: 0.6 MG/DL (ref 0.2–1)
BILIRUB UR QL: NEGATIVE
BUN SERPL-MCNC: 22 MG/DL (ref 6–20)
BUN/CREAT SERPL: 20 (ref 12–20)
CALCIUM SERPL-MCNC: 10.3 MG/DL (ref 8.5–10.1)
CHLORIDE SERPL-SCNC: 110 MMOL/L (ref 97–108)
CHOLEST SERPL-MCNC: 177 MG/DL
CO2 SERPL-SCNC: 21 MMOL/L (ref 21–32)
COLOR UR: ABNORMAL
CREAT SERPL-MCNC: 1.12 MG/DL (ref 0.7–1.3)
CRP SERPL-MCNC: <0.29 MG/DL (ref 0–0.3)
DIFFERENTIAL METHOD BLD: NORMAL
EKG ATRIAL RATE: 69 BPM
EKG DIAGNOSIS: NORMAL
EKG P AXIS: 48 DEGREES
EKG P-R INTERVAL: 184 MS
EKG Q-T INTERVAL: 420 MS
EKG QTC CALCULATION (BAZETT): 450 MS
EKG R AXIS: 2 DEGREES
EOSINOPHIL # BLD: 0.3 K/UL (ref 0–0.4)
EOSINOPHIL NFR BLD: 4 % (ref 0–7)
EPITH CASTS URNS QL MICRO: ABNORMAL /LPF
ERYTHROCYTE [DISTWIDTH] IN BLOOD BY AUTOMATED COUNT: 13.5 % (ref 11.5–14.5)
EST. AVERAGE GLUCOSE BLD GHB EST-MCNC: 100 MG/DL
FOLATE SERPL-MCNC: 16.8 NG/ML (ref 5–21)
GLOBULIN SER CALC-MCNC: 3.4 G/DL (ref 2–4)
GLUCOSE SERPL-MCNC: 120 MG/DL (ref 65–100)
GLUCOSE UR STRIP.AUTO-MCNC: NEGATIVE MG/DL
HBA1C MFR BLD: 5.1 % (ref 4–5.6)
HCT VFR BLD AUTO: 40.9 % (ref 36.6–50.3)
HDLC SERPL-MCNC: 50 MG/DL
HDLC SERPL: 3.5 (ref 0–5)
HGB BLD-MCNC: 13.5 G/DL (ref 12.1–17)
HGB UR QL STRIP: ABNORMAL
HYALINE CASTS URNS QL MICRO: ABNORMAL /LPF (ref 0–5)
IMM GRANULOCYTES # BLD AUTO: 0 K/UL (ref 0–0.04)
IMM GRANULOCYTES NFR BLD AUTO: 0 % (ref 0–0.5)
KETONES UR QL STRIP.AUTO: 15 MG/DL
LDLC SERPL CALC-MCNC: 90 MG/DL (ref 0–100)
LEUKOCYTE ESTERASE UR QL STRIP.AUTO: ABNORMAL
LYMPHOCYTES # BLD: 2.1 K/UL (ref 0.8–3.5)
LYMPHOCYTES NFR BLD: 24 % (ref 12–49)
MAGNESIUM SERPL-MCNC: 2.2 MG/DL (ref 1.6–2.4)
MCH RBC QN AUTO: 31.7 PG (ref 26–34)
MCHC RBC AUTO-ENTMCNC: 33 G/DL (ref 30–36.5)
MCV RBC AUTO: 96 FL (ref 80–99)
MONOCYTES # BLD: 0.5 K/UL (ref 0–1)
MONOCYTES NFR BLD: 6 % (ref 5–13)
NEUTS SEG # BLD: 5.6 K/UL (ref 1.8–8)
NEUTS SEG NFR BLD: 65 % (ref 32–75)
NITRITE UR QL STRIP.AUTO: POSITIVE
NRBC # BLD: 0 K/UL (ref 0–0.01)
NRBC BLD-RTO: 0 PER 100 WBC
PH UR STRIP: 6 (ref 5–8)
PHOSPHATE SERPL-MCNC: 2.5 MG/DL (ref 2.6–4.7)
PLATELET # BLD AUTO: 186 K/UL (ref 150–400)
PMV BLD AUTO: 9.5 FL (ref 8.9–12.9)
POTASSIUM SERPL-SCNC: 3.8 MMOL/L (ref 3.5–5.1)
PROT SERPL-MCNC: 7 G/DL (ref 6.4–8.2)
PROT UR STRIP-MCNC: 30 MG/DL
RBC # BLD AUTO: 4.26 M/UL (ref 4.1–5.7)
RBC #/AREA URNS HPF: ABNORMAL /HPF (ref 0–5)
SODIUM SERPL-SCNC: 140 MMOL/L (ref 136–145)
SP GR UR REFRACTOMETRY: 1.01
TRIGL SERPL-MCNC: 185 MG/DL
TROPONIN I SERPL HS-MCNC: 18 NG/L (ref 0–76)
TSH SERPL DL<=0.05 MIU/L-ACNC: 3.35 UIU/ML (ref 0.36–3.74)
URINE CULTURE IF INDICATED: ABNORMAL
UROBILINOGEN UR QL STRIP.AUTO: 0.2 EU/DL (ref 0.2–1)
VIT B12 SERPL-MCNC: 467 PG/ML (ref 193–986)
VLDLC SERPL CALC-MCNC: 37 MG/DL
WBC # BLD AUTO: 8.5 K/UL (ref 4.1–11.1)
WBC URNS QL MICRO: ABNORMAL /HPF (ref 0–4)

## 2024-06-15 PROCEDURE — 70551 MRI BRAIN STEM W/O DYE: CPT

## 2024-06-15 PROCEDURE — 97161 PT EVAL LOW COMPLEX 20 MIN: CPT

## 2024-06-15 PROCEDURE — 6360000002 HC RX W HCPCS: Performed by: INTERNAL MEDICINE

## 2024-06-15 PROCEDURE — 82746 ASSAY OF FOLIC ACID SERUM: CPT

## 2024-06-15 PROCEDURE — 87088 URINE BACTERIA CULTURE: CPT

## 2024-06-15 PROCEDURE — 99223 1ST HOSP IP/OBS HIGH 75: CPT | Performed by: PSYCHIATRY & NEUROLOGY

## 2024-06-15 PROCEDURE — 87186 SC STD MICRODIL/AGAR DIL: CPT

## 2024-06-15 PROCEDURE — 84100 ASSAY OF PHOSPHORUS: CPT

## 2024-06-15 PROCEDURE — 2060000000 HC ICU INTERMEDIATE R&B

## 2024-06-15 PROCEDURE — 83036 HEMOGLOBIN GLYCOSYLATED A1C: CPT

## 2024-06-15 PROCEDURE — 80053 COMPREHEN METABOLIC PANEL: CPT

## 2024-06-15 PROCEDURE — 97530 THERAPEUTIC ACTIVITIES: CPT

## 2024-06-15 PROCEDURE — 84443 ASSAY THYROID STIM HORMONE: CPT

## 2024-06-15 PROCEDURE — 94760 N-INVAS EAR/PLS OXIMETRY 1: CPT

## 2024-06-15 PROCEDURE — 36415 COLL VENOUS BLD VENIPUNCTURE: CPT

## 2024-06-15 PROCEDURE — 86140 C-REACTIVE PROTEIN: CPT

## 2024-06-15 PROCEDURE — 85025 COMPLETE CBC W/AUTO DIFF WBC: CPT

## 2024-06-15 PROCEDURE — 83735 ASSAY OF MAGNESIUM: CPT

## 2024-06-15 PROCEDURE — 2580000003 HC RX 258: Performed by: INTERNAL MEDICINE

## 2024-06-15 PROCEDURE — 80061 LIPID PANEL: CPT

## 2024-06-15 PROCEDURE — 87086 URINE CULTURE/COLONY COUNT: CPT

## 2024-06-15 PROCEDURE — 82607 VITAMIN B-12: CPT

## 2024-06-15 PROCEDURE — 6370000000 HC RX 637 (ALT 250 FOR IP): Performed by: PSYCHIATRY & NEUROLOGY

## 2024-06-15 PROCEDURE — 81001 URINALYSIS AUTO W/SCOPE: CPT

## 2024-06-15 PROCEDURE — 6370000000 HC RX 637 (ALT 250 FOR IP): Performed by: INTERNAL MEDICINE

## 2024-06-15 PROCEDURE — 84484 ASSAY OF TROPONIN QUANT: CPT

## 2024-06-15 RX ORDER — POLYETHYLENE GLYCOL 3350 17 G/17G
17 POWDER, FOR SOLUTION ORAL DAILY PRN
Status: DISCONTINUED | OUTPATIENT
Start: 2024-06-15 | End: 2024-06-17 | Stop reason: HOSPADM

## 2024-06-15 RX ORDER — CLOPIDOGREL BISULFATE 75 MG/1
75 TABLET ORAL DAILY
Status: DISCONTINUED | OUTPATIENT
Start: 2024-06-15 | End: 2024-06-17 | Stop reason: HOSPADM

## 2024-06-15 RX ORDER — SODIUM CHLORIDE 9 MG/ML
INJECTION, SOLUTION INTRAVENOUS CONTINUOUS
Status: DISCONTINUED | OUTPATIENT
Start: 2024-06-15 | End: 2024-06-15

## 2024-06-15 RX ORDER — ONDANSETRON 2 MG/ML
4 INJECTION INTRAMUSCULAR; INTRAVENOUS EVERY 6 HOURS PRN
Status: DISCONTINUED | OUTPATIENT
Start: 2024-06-15 | End: 2024-06-17 | Stop reason: HOSPADM

## 2024-06-15 RX ORDER — CALCIUM CARBONATE 500 MG/1
500 TABLET, CHEWABLE ORAL 3 TIMES DAILY PRN
Status: DISCONTINUED | OUTPATIENT
Start: 2024-06-15 | End: 2024-06-17 | Stop reason: HOSPADM

## 2024-06-15 RX ORDER — SODIUM CHLORIDE 9 MG/ML
INJECTION, SOLUTION INTRAVENOUS PRN
Status: DISCONTINUED | OUTPATIENT
Start: 2024-06-15 | End: 2024-06-17 | Stop reason: HOSPADM

## 2024-06-15 RX ORDER — SODIUM CHLORIDE 0.9 % (FLUSH) 0.9 %
5-40 SYRINGE (ML) INJECTION PRN
Status: DISCONTINUED | OUTPATIENT
Start: 2024-06-15 | End: 2024-06-17 | Stop reason: HOSPADM

## 2024-06-15 RX ORDER — ONDANSETRON 4 MG/1
4 TABLET, ORALLY DISINTEGRATING ORAL EVERY 8 HOURS PRN
Status: DISCONTINUED | OUTPATIENT
Start: 2024-06-15 | End: 2024-06-17 | Stop reason: HOSPADM

## 2024-06-15 RX ORDER — SODIUM CHLORIDE 0.9 % (FLUSH) 0.9 %
5-40 SYRINGE (ML) INJECTION EVERY 12 HOURS SCHEDULED
Status: DISCONTINUED | OUTPATIENT
Start: 2024-06-15 | End: 2024-06-17 | Stop reason: HOSPADM

## 2024-06-15 RX ORDER — LEVOFLOXACIN 5 MG/ML
750 INJECTION, SOLUTION INTRAVENOUS EVERY 24 HOURS
Status: DISCONTINUED | OUTPATIENT
Start: 2024-06-15 | End: 2024-06-16

## 2024-06-15 RX ORDER — ATORVASTATIN CALCIUM 40 MG/1
80 TABLET, FILM COATED ORAL NIGHTLY
Status: DISCONTINUED | OUTPATIENT
Start: 2024-06-15 | End: 2024-06-15

## 2024-06-15 RX ORDER — ASPIRIN 81 MG/1
81 TABLET, CHEWABLE ORAL DAILY
Status: DISCONTINUED | OUTPATIENT
Start: 2024-06-15 | End: 2024-06-17 | Stop reason: HOSPADM

## 2024-06-15 RX ORDER — ASPIRIN 300 MG/1
300 SUPPOSITORY RECTAL DAILY
Status: DISCONTINUED | OUTPATIENT
Start: 2024-06-15 | End: 2024-06-17 | Stop reason: HOSPADM

## 2024-06-15 RX ORDER — ENOXAPARIN SODIUM 100 MG/ML
40 INJECTION SUBCUTANEOUS DAILY
Status: DISCONTINUED | OUTPATIENT
Start: 2024-06-15 | End: 2024-06-17 | Stop reason: HOSPADM

## 2024-06-15 RX ORDER — ATORVASTATIN CALCIUM 40 MG/1
40 TABLET, FILM COATED ORAL NIGHTLY
Status: DISCONTINUED | OUTPATIENT
Start: 2024-06-15 | End: 2024-06-17 | Stop reason: HOSPADM

## 2024-06-15 RX ADMIN — CALCIUM CARBONATE (ANTACID) CHEW TAB 500 MG 500 MG: 500 CHEW TAB at 21:06

## 2024-06-15 RX ADMIN — ENOXAPARIN SODIUM 40 MG: 100 INJECTION SUBCUTANEOUS at 09:15

## 2024-06-15 RX ADMIN — ONDANSETRON 4 MG: 2 INJECTION INTRAMUSCULAR; INTRAVENOUS at 05:20

## 2024-06-15 RX ADMIN — SODIUM CHLORIDE: 9 INJECTION, SOLUTION INTRAVENOUS at 16:50

## 2024-06-15 RX ADMIN — ATORVASTATIN CALCIUM 80 MG: 40 TABLET, FILM COATED ORAL at 03:35

## 2024-06-15 RX ADMIN — ASPIRIN 81 MG CHEWABLE TABLET 81 MG: 81 TABLET CHEWABLE at 09:15

## 2024-06-15 RX ADMIN — SODIUM CHLORIDE: 9 INJECTION, SOLUTION INTRAVENOUS at 03:39

## 2024-06-15 RX ADMIN — LEVOFLOXACIN 750 MG: 750 INJECTION, SOLUTION INTRAVENOUS at 18:30

## 2024-06-15 RX ADMIN — ATORVASTATIN CALCIUM 40 MG: 40 TABLET, FILM COATED ORAL at 21:07

## 2024-06-15 RX ADMIN — SODIUM CHLORIDE, PRESERVATIVE FREE 10 ML: 5 INJECTION INTRAVENOUS at 21:07

## 2024-06-15 RX ADMIN — CLOPIDOGREL BISULFATE 75 MG: 75 TABLET ORAL at 19:04

## 2024-06-15 NOTE — PLAN OF CARE
Problem: Discharge Planning  Goal: Discharge to home or other facility with appropriate resources  Outcome: Progressing  Flowsheets  Taken 6/15/2024 0800 by Cassidy Morrissey LPN  Discharge to home or other facility with appropriate resources: Identify barriers to discharge with patient and caregiver  Taken 6/15/2024 0030 by Gill Mccurdy, GÓMEZ  Discharge to home or other facility with appropriate resources: Identify barriers to discharge with patient and caregiver     Problem: Safety - Adult  Goal: Free from fall injury  Outcome: Progressing     Problem: Chronic Conditions and Co-morbidities  Goal: Patient's chronic conditions and co-morbidity symptoms are monitored and maintained or improved  6/15/2024 1200 by Cassidy Morrissey LPN  Outcome: Progressing  6/15/2024 0823 by Gill Mccurdy RN  Outcome: Progressing  Flowsheets (Taken 6/15/2024 0800 by Cassidy Morrissey LPN)  Care Plan - Patient's Chronic Conditions and Co-Morbidity Symptoms are Monitored and Maintained or Improved: Monitor and assess patient's chronic conditions and comorbid symptoms for stability, deterioration, or improvement

## 2024-06-15 NOTE — ED NOTES
Bedside and Verbal shift change report given to Alexis (oncoming nurse) by Saumya (offgoing nurse). Report included the following information Nurse Handoff Report, ED Encounter Summary, ED SBAR, Intake/Output, MAR, and Recent Results.

## 2024-06-15 NOTE — PLAN OF CARE
Problem: Chronic Conditions and Co-morbidities  Goal: Patient's chronic conditions and co-morbidity symptoms are monitored and maintained or improved  Outcome: Progressing     Problem: Discharge Planning  Goal: Discharge to home or other facility with appropriate resources  Recent Flowsheet Documentation  Taken 6/15/2024 0030 by Gill Mccurdy, GÓMEZ  Discharge to home or other facility with appropriate resources: Identify barriers to discharge with patient and caregiver

## 2024-06-15 NOTE — H&P
APPENDECTOMY  1954    CATARACT REMOVAL      bilateral    PA UNLISTED PROCEDURE CARDIAC SURGERY      2 Stents    UROLOGICAL SURGERY  6/24/15    RIGHT ROBOTIC PARTIAL NEPHRECTOMY  (LATEX ALLERGY), renal exploration, cystectomy x 3     Prior to Admission medications    Medication Sig Start Date End Date Taking? Authorizing Provider   Misc Natural Products (COMPLETE PROSTATE HEALTH PO) Take 1 tablet by mouth daily.    Matthew Parson MD   Darolutamide (NUBEQA) 300 MG TABS Take 2 tablets by mouth with breakfast and with evening meal.    Matthew Parson MD   aspirin 81 MG EC tablet Take 1 tablet by mouth daily 7/4/23   Matthew Parson MD   Saw Gates 160 MG CAPS Take 1 capsule by mouth 2 times daily    Matthew Parson MD   Multiple Vitamin (MULTIVITAMIN ADULT PO) Take 1 tablet by mouth daily    Matthew Parson MD   bisacodyl (DULCOLAX) 5 MG EC tablet Take 1 tablet by mouth daily    Matthew Parson MD     Allergies   Allergen Reactions    Latex Other (See Comments)     Blistering of the skin      Family History   Problem Relation Age of Onset    No Known Problems Sister     No Known Problems Sister     No Known Problems Sister     Alzheimer's Disease Mother     Cancer Father         colon      Social History:  reports that he quit smoking about 18 months ago. His smoking use included cigarettes. He started smoking about 60 years ago. He has a 29.4 pack-year smoking history. He has been exposed to tobacco smoke. He has never used smokeless tobacco. He reports that he does not drink alcohol and does not use drugs.   Social Determinants of Health     Tobacco Use: Medium Risk (7/31/2023)    Patient History     Smoking Tobacco Use: Former     Smokeless Tobacco Use: Never     Passive Exposure: Past   Alcohol Use: Not on file   Financial Resource Strain: Low Risk  (6/30/2023)    Overall Financial Resource Strain (CARDIA)     Difficulty of Paying Living Expenses: Not hard at all   Food

## 2024-06-16 ENCOUNTER — APPOINTMENT (OUTPATIENT)
Facility: HOSPITAL | Age: 84
DRG: 069 | End: 2024-06-16
Attending: INTERNAL MEDICINE
Payer: MEDICARE

## 2024-06-16 PROBLEM — G45.9 TIA (TRANSIENT ISCHEMIC ATTACK): Status: ACTIVE | Noted: 2024-06-14

## 2024-06-16 LAB
ANION GAP SERPL CALC-SCNC: 4 MMOL/L (ref 5–15)
BUN SERPL-MCNC: 28 MG/DL (ref 6–20)
BUN/CREAT SERPL: 20 (ref 12–20)
CALCIUM SERPL-MCNC: 9.8 MG/DL (ref 8.5–10.1)
CHLORIDE SERPL-SCNC: 114 MMOL/L (ref 97–108)
CO2 SERPL-SCNC: 22 MMOL/L (ref 21–32)
CREAT SERPL-MCNC: 1.4 MG/DL (ref 0.7–1.3)
ERYTHROCYTE [DISTWIDTH] IN BLOOD BY AUTOMATED COUNT: 13.4 % (ref 11.5–14.5)
GLUCOSE SERPL-MCNC: 94 MG/DL (ref 65–100)
HCT VFR BLD AUTO: 37.8 % (ref 36.6–50.3)
HGB BLD-MCNC: 12.1 G/DL (ref 12.1–17)
MAGNESIUM SERPL-MCNC: 2.2 MG/DL (ref 1.6–2.4)
MCH RBC QN AUTO: 31.2 PG (ref 26–34)
MCHC RBC AUTO-ENTMCNC: 32 G/DL (ref 30–36.5)
MCV RBC AUTO: 97.4 FL (ref 80–99)
NRBC # BLD: 0 K/UL (ref 0–0.01)
NRBC BLD-RTO: 0 PER 100 WBC
PHOSPHATE SERPL-MCNC: 2.9 MG/DL (ref 2.6–4.7)
PLATELET # BLD AUTO: 170 K/UL (ref 150–400)
PMV BLD AUTO: 9.9 FL (ref 8.9–12.9)
POTASSIUM SERPL-SCNC: 4.5 MMOL/L (ref 3.5–5.1)
PROCALCITONIN SERPL-MCNC: <0.05 NG/ML
RBC # BLD AUTO: 3.88 M/UL (ref 4.1–5.7)
SODIUM SERPL-SCNC: 140 MMOL/L (ref 136–145)
WBC # BLD AUTO: 8.3 K/UL (ref 4.1–11.1)

## 2024-06-16 PROCEDURE — 84145 PROCALCITONIN (PCT): CPT

## 2024-06-16 PROCEDURE — 2580000003 HC RX 258: Performed by: INTERNAL MEDICINE

## 2024-06-16 PROCEDURE — 36415 COLL VENOUS BLD VENIPUNCTURE: CPT

## 2024-06-16 PROCEDURE — 83735 ASSAY OF MAGNESIUM: CPT

## 2024-06-16 PROCEDURE — 93308 TTE F-UP OR LMTD: CPT

## 2024-06-16 PROCEDURE — 94760 N-INVAS EAR/PLS OXIMETRY 1: CPT

## 2024-06-16 PROCEDURE — 6370000000 HC RX 637 (ALT 250 FOR IP): Performed by: PSYCHIATRY & NEUROLOGY

## 2024-06-16 PROCEDURE — 6370000000 HC RX 637 (ALT 250 FOR IP): Performed by: INTERNAL MEDICINE

## 2024-06-16 PROCEDURE — 6360000002 HC RX W HCPCS: Performed by: INTERNAL MEDICINE

## 2024-06-16 PROCEDURE — 85027 COMPLETE CBC AUTOMATED: CPT

## 2024-06-16 PROCEDURE — 84100 ASSAY OF PHOSPHORUS: CPT

## 2024-06-16 PROCEDURE — 80048 BASIC METABOLIC PNL TOTAL CA: CPT

## 2024-06-16 PROCEDURE — 2060000000 HC ICU INTERMEDIATE R&B

## 2024-06-16 RX ORDER — LEVOFLOXACIN 500 MG/1
500 TABLET, FILM COATED ORAL
Qty: 6 TABLET | Refills: 0 | Status: CANCELLED | OUTPATIENT
Start: 2024-06-16 | End: 2024-06-22

## 2024-06-16 RX ORDER — CLOPIDOGREL BISULFATE 75 MG/1
75 TABLET ORAL DAILY
Qty: 19 TABLET | Refills: 0 | Status: CANCELLED | OUTPATIENT
Start: 2024-06-17 | End: 2024-07-06

## 2024-06-16 RX ORDER — LEVOFLOXACIN 5 MG/ML
750 INJECTION, SOLUTION INTRAVENOUS
Status: DISCONTINUED | OUTPATIENT
Start: 2024-06-17 | End: 2024-06-17

## 2024-06-16 RX ORDER — SODIUM CHLORIDE 9 MG/ML
INJECTION, SOLUTION INTRAVENOUS CONTINUOUS
Status: DISCONTINUED | OUTPATIENT
Start: 2024-06-16 | End: 2024-06-17 | Stop reason: HOSPADM

## 2024-06-16 RX ADMIN — SODIUM CHLORIDE, PRESERVATIVE FREE 10 ML: 5 INJECTION INTRAVENOUS at 21:22

## 2024-06-16 RX ADMIN — SODIUM CHLORIDE: 9 INJECTION, SOLUTION INTRAVENOUS at 19:02

## 2024-06-16 RX ADMIN — ASPIRIN 81 MG CHEWABLE TABLET 81 MG: 81 TABLET CHEWABLE at 09:05

## 2024-06-16 RX ADMIN — ATORVASTATIN CALCIUM 40 MG: 40 TABLET, FILM COATED ORAL at 21:21

## 2024-06-16 RX ADMIN — ENOXAPARIN SODIUM 40 MG: 100 INJECTION SUBCUTANEOUS at 09:05

## 2024-06-16 RX ADMIN — SODIUM CHLORIDE, PRESERVATIVE FREE 10 ML: 5 INJECTION INTRAVENOUS at 09:09

## 2024-06-16 RX ADMIN — CLOPIDOGREL BISULFATE 75 MG: 75 TABLET ORAL at 09:05

## 2024-06-16 NOTE — PLAN OF CARE
Problem: Discharge Planning  Goal: Discharge to home or other facility with appropriate resources  6/15/2024 2232 by Debbie Ward RN  Outcome: Progressing  Flowsheets (Taken 6/15/2024 2000)  Discharge to home or other facility with appropriate resources: Identify barriers to discharge with patient and caregiver  6/15/2024 1200 by Cassidy Morrissey LPN  Outcome: Progressing  Flowsheets  Taken 6/15/2024 0800 by Cassidy Morrissey LPN  Discharge to home or other facility with appropriate resources: Identify barriers to discharge with patient and caregiver  Taken 6/15/2024 0030 by Gill Mccurdy RN  Discharge to home or other facility with appropriate resources: Identify barriers to discharge with patient and caregiver     Problem: Safety - Adult  Goal: Free from fall injury  6/15/2024 2232 by Debbie Ward RN  Outcome: Progressing  6/15/2024 1200 by Cassidy Morrissey LPN  Outcome: Progressing     Problem: Chronic Conditions and Co-morbidities  Goal: Patient's chronic conditions and co-morbidity symptoms are monitored and maintained or improved  6/15/2024 2232 by Debbie Ward RN  Outcome: Progressing  Flowsheets (Taken 6/15/2024 2000)  Care Plan - Patient's Chronic Conditions and Co-Morbidity Symptoms are Monitored and Maintained or Improved: Monitor and assess patient's chronic conditions and comorbid symptoms for stability, deterioration, or improvement  6/15/2024 1200 by Cassidy Morrissey LPN  Outcome: Progressing

## 2024-06-16 NOTE — CARE COORDINATION
Care Management Initial Assessment       RUR:13%  Readmission? No  1st IM letter given? No-Not given by Pt Registration  1st  letter given: No      06/16/24 3516   Service Assessment   Patient Orientation Alert and Oriented   Cognition Alert   History Provided By Medical Record   Primary Caregiver Self   Support Systems Spouse/Significant Other   PCP Verified by CM Yes   Last Visit to PCP Within last 6 months   Prior Functional Level Independent in ADLs/IADLs   Current Functional Level Independent in ADLs/IADLs   Can patient return to prior living arrangement Unknown at present   Ability to make needs known: Good   Family able to assist with home care needs: Yes   Would you like for me to discuss the discharge plan with any other family members/significant others, and if so, who? No   Financial Resources None     CM reviewed pt's chart and therapy notes. He is independent with his ADL's. No needs for d/c noted. Rosio Denton,DESIREW,ACM-SW

## 2024-06-17 VITALS
RESPIRATION RATE: 18 BRPM | WEIGHT: 171.08 LBS | HEIGHT: 69 IN | HEART RATE: 68 BPM | DIASTOLIC BLOOD PRESSURE: 50 MMHG | OXYGEN SATURATION: 95 % | TEMPERATURE: 98.4 F | SYSTOLIC BLOOD PRESSURE: 119 MMHG | BODY MASS INDEX: 25.34 KG/M2

## 2024-06-17 PROBLEM — G45.9 TIA (TRANSIENT ISCHEMIC ATTACK): Status: RESOLVED | Noted: 2024-06-14 | Resolved: 2024-06-17

## 2024-06-17 LAB
ANION GAP SERPL CALC-SCNC: 3 MMOL/L (ref 5–15)
BACTERIA SPEC CULT: ABNORMAL
BUN SERPL-MCNC: 25 MG/DL (ref 6–20)
BUN/CREAT SERPL: 18 (ref 12–20)
CALCIUM SERPL-MCNC: 9.6 MG/DL (ref 8.5–10.1)
CC UR VC: ABNORMAL
CHLORIDE SERPL-SCNC: 117 MMOL/L (ref 97–108)
CO2 SERPL-SCNC: 22 MMOL/L (ref 21–32)
CREAT SERPL-MCNC: 1.38 MG/DL (ref 0.7–1.3)
ECHO AV AREA PEAK VELOCITY: 2.6 CM2
ECHO AV AREA/BSA PEAK VELOCITY: 1.3 CM2/M2
ECHO AV PEAK GRADIENT: 8 MMHG
ECHO AV PEAK VELOCITY: 1.4 M/S
ECHO AV VELOCITY RATIO: 0.86
ECHO BSA: 1.95 M2
ECHO LV FRACTIONAL SHORTENING: 33 % (ref 28–44)
ECHO LV INTERNAL DIMENSION DIASTOLE INDEX: 2.33 CM/M2
ECHO LV INTERNAL DIMENSION DIASTOLIC: 4.5 CM (ref 4.2–5.9)
ECHO LV INTERNAL DIMENSION SYSTOLIC INDEX: 1.55 CM/M2
ECHO LV INTERNAL DIMENSION SYSTOLIC: 3 CM
ECHO LV IVSD: 0.9 CM (ref 0.6–1)
ECHO LV MASS 2D: 153.3 G (ref 88–224)
ECHO LV MASS INDEX 2D: 79.4 G/M2 (ref 49–115)
ECHO LV POSTERIOR WALL DIASTOLIC: 1.1 CM (ref 0.6–1)
ECHO LV RELATIVE WALL THICKNESS RATIO: 0.49
ECHO LVOT AREA: 3.1 CM2
ECHO LVOT DIAM: 2 CM
ECHO LVOT PEAK GRADIENT: 6 MMHG
ECHO LVOT PEAK VELOCITY: 1.2 M/S
ERYTHROCYTE [DISTWIDTH] IN BLOOD BY AUTOMATED COUNT: 13.7 % (ref 11.5–14.5)
GLUCOSE SERPL-MCNC: 94 MG/DL (ref 65–100)
HCT VFR BLD AUTO: 35.4 % (ref 36.6–50.3)
HGB BLD-MCNC: 11.8 G/DL (ref 12.1–17)
MAGNESIUM SERPL-MCNC: 2.2 MG/DL (ref 1.6–2.4)
MCH RBC QN AUTO: 32 PG (ref 26–34)
MCHC RBC AUTO-ENTMCNC: 33.3 G/DL (ref 30–36.5)
MCV RBC AUTO: 95.9 FL (ref 80–99)
NRBC # BLD: 0 K/UL (ref 0–0.01)
NRBC BLD-RTO: 0 PER 100 WBC
PHOSPHATE SERPL-MCNC: 2.5 MG/DL (ref 2.6–4.7)
PLATELET # BLD AUTO: 162 K/UL (ref 150–400)
PMV BLD AUTO: 9.9 FL (ref 8.9–12.9)
POTASSIUM SERPL-SCNC: 3.9 MMOL/L (ref 3.5–5.1)
RBC # BLD AUTO: 3.69 M/UL (ref 4.1–5.7)
SERVICE CMNT-IMP: ABNORMAL
SODIUM SERPL-SCNC: 142 MMOL/L (ref 136–145)
WBC # BLD AUTO: 7.4 K/UL (ref 4.1–11.1)

## 2024-06-17 PROCEDURE — 6370000000 HC RX 637 (ALT 250 FOR IP): Performed by: PSYCHIATRY & NEUROLOGY

## 2024-06-17 PROCEDURE — 2580000003 HC RX 258: Performed by: INTERNAL MEDICINE

## 2024-06-17 PROCEDURE — 85027 COMPLETE CBC AUTOMATED: CPT

## 2024-06-17 PROCEDURE — 6370000000 HC RX 637 (ALT 250 FOR IP): Performed by: INTERNAL MEDICINE

## 2024-06-17 PROCEDURE — 2580000003 HC RX 258: Performed by: STUDENT IN AN ORGANIZED HEALTH CARE EDUCATION/TRAINING PROGRAM

## 2024-06-17 PROCEDURE — 6370000000 HC RX 637 (ALT 250 FOR IP): Performed by: NURSE PRACTITIONER

## 2024-06-17 PROCEDURE — 83735 ASSAY OF MAGNESIUM: CPT

## 2024-06-17 PROCEDURE — 80048 BASIC METABOLIC PNL TOTAL CA: CPT

## 2024-06-17 PROCEDURE — 6360000002 HC RX W HCPCS: Performed by: INTERNAL MEDICINE

## 2024-06-17 PROCEDURE — 99222 1ST HOSP IP/OBS MODERATE 55: CPT | Performed by: INTERNAL MEDICINE

## 2024-06-17 PROCEDURE — 6360000002 HC RX W HCPCS: Performed by: STUDENT IN AN ORGANIZED HEALTH CARE EDUCATION/TRAINING PROGRAM

## 2024-06-17 PROCEDURE — 84100 ASSAY OF PHOSPHORUS: CPT

## 2024-06-17 PROCEDURE — 36415 COLL VENOUS BLD VENIPUNCTURE: CPT

## 2024-06-17 RX ORDER — CLOPIDOGREL BISULFATE 75 MG/1
75 TABLET ORAL DAILY
Qty: 18 TABLET | Refills: 0 | Status: SHIPPED | OUTPATIENT
Start: 2024-06-18 | End: 2024-07-06

## 2024-06-17 RX ORDER — GRANULES FOR ORAL 3 G/1
3 POWDER ORAL ONCE
Qty: 1 EACH | Refills: 0 | Status: SHIPPED | OUTPATIENT
Start: 2024-06-20 | End: 2024-06-20

## 2024-06-17 RX ORDER — GRANULES FOR ORAL 3 G/1
3 POWDER ORAL ONCE
Status: COMPLETED | OUTPATIENT
Start: 2024-06-17 | End: 2024-06-17

## 2024-06-17 RX ORDER — ATORVASTATIN CALCIUM 40 MG/1
40 TABLET, FILM COATED ORAL NIGHTLY
Qty: 30 TABLET | Refills: 3 | Status: SHIPPED | OUTPATIENT
Start: 2024-06-17

## 2024-06-17 RX ADMIN — ENOXAPARIN SODIUM 40 MG: 100 INJECTION SUBCUTANEOUS at 11:05

## 2024-06-17 RX ADMIN — SODIUM CHLORIDE, PRESERVATIVE FREE 10 ML: 5 INJECTION INTRAVENOUS at 11:05

## 2024-06-17 RX ADMIN — SODIUM CHLORIDE: 9 INJECTION, SOLUTION INTRAVENOUS at 03:18

## 2024-06-17 RX ADMIN — GRANULES FOR ORAL SOLUTION 1 PACKET: 3 POWDER ORAL at 14:54

## 2024-06-17 RX ADMIN — PIPERACILLIN AND TAZOBACTAM 4500 MG: 4; .5 INJECTION, POWDER, FOR SOLUTION INTRAVENOUS at 13:07

## 2024-06-17 RX ADMIN — CLOPIDOGREL BISULFATE 75 MG: 75 TABLET ORAL at 11:05

## 2024-06-17 RX ADMIN — ASPIRIN 81 MG CHEWABLE TABLET 81 MG: 81 TABLET CHEWABLE at 11:05

## 2024-06-17 NOTE — DISCHARGE SUMMARY
Discharge Summary       PATIENT ID: Jose Fuentes  MRN: [id number]   YOB: 1940    DATE OF ADMISSION: 6/14/2024  6:13 PM    DATE OF DISCHARGE: 06/17/24    PRIMARY CARE PROVIDER: Tez Srinivasan DO     ATTENDING PHYSICIAN: Caitlin Montelongo MD   DISCHARGING PROVIDER: Caitlin Montelongo MD    To contact this individual call [phone] and ask the  to page.  If unavailable ask to be transferred the Adult Hospitalist Department.    CONSULTATIONS: IP CONSULT TO TELE-NEUROLOGY  IP CONSULT TO NEUROLOGY  IP CONSULT TO INFECTIOUS DISEASES    PROCEDURES/SURGERIES: * No surgery found *     ADMITTING DIAGNOSES & HOSPITAL COURSE:   Jose Fuentes is a 84 y.o. male who presented with right arm weakness and was found to have a TIA. Fortunately patient's deficits improved during admission. He was evaluated by Neurology and recommended to start lipitor, continue aspirin, and start plavix for 21 days. Patient was also found to have ESBL UTI. He will take one dose of Fosfomycin 3g x 1 and then again on 6/20.        DISCHARGE DIAGNOSES / PLAN:        TIA;  RUE weakness, Left facial droop, resolved  -CT head/ perfusion scan/ CTA head and neck:   1. No acute intracranial or vascular abnormality, no large vessel occlusion. No  hemodynamic significant stenosis. Normal perfusion.  2. Emphysema.  -MRI brain:  No acute intracranial hemorrhage or infarct.   Echo in 2022  showed     Left Ventricle: Normal left ventricular systolic function with a visually estimated EF of 55 - 60%. Left ventricle size is normal. Normal wall thickness. Normal wall motion.    Study Details: Saline contrast was given to evaluate for intracardiac shunt. No shunt seen. Bubble study was negative.  Plan:   - Asa 81mg, + plavix 75mg x 21 days and then back to aspirin monotherapy  - Lipitor 40mg daily      Acute kidney injury, resolved  - resolved overnight with IVFs;       Acute Cystitis:   -Ucx with ESBL UTI: fosfomycin 3g on 6/17 and 6/20      Hx of

## 2024-06-17 NOTE — CONSULTS
without  hemodynamically significant stenosis..    CTA NECK:    Great vessels: Normal arch anatomy with the origins patent.    Right subclavian artery: Patent    Left subclavian artery: Patent    Right common carotid artery: Patent    Left common carotid artery: Patent    Cervical right internal carotid artery: Patent with no significant stenosis by  NASCET criteria.    Cervical left internal carotid artery: Patent with no significant stenosis by  NASCET criteria.    Right vertebral artery: Patent    Left vertebral artery: Patent    Emphysema. The thyroid is homogeneous. No cervical lymphadenopathy. Chronic  compression deformity of T3, unchanged since June 2023.    Measurements utilize NASCET criteria.    CTA HEAD:    Right cavernous internal carotid artery: Patent    Left cavernous internal carotid artery: Mild atherosclerotic disease without  hemodynamically significant stenosis.    Anterior cerebral arteries: Patent    Anterior communicating artery: Patent    Right middle cerebral artery: Patent    Left middle cerebral artery: Patent    Posterior communicating arteries: Widely patent on the right, diminutive on the  left    Posterior cerebral arteries: Fetal origin of the right posterior cerebral  artery, normal anatomy on the left. Distal branches are patent bilaterally.    Basilar artery: Patent    Distal vertebral arteries: Patent    No evidence for intracranial aneurysm or hemodynamically significant stenosis.    CT Perfusion:  Normal CT perfusion.    Impression  1. No acute intracranial or vascular abnormality, no large vessel occlusion. No  hemodynamic significant stenosis. Normal perfusion.  2. Emphysema.    Electronically signed by BOLA NORIEGA          Assessment and Plan:   Pt is an 85yo RH male with h/o HTN, CAD with MI, not on APT/OAC at home, admitted 6/14/24 with right arm weakness since 0400 and face weakness since 1500, resolved by 1900. /65. NIHSS score 0. CTH neg, CTA H/N no LVO, no 
Distal branches are patent bilaterally. Basilar artery: Patent Distal vertebral arteries: Patent No evidence for intracranial aneurysm or hemodynamically significant stenosis. CT Perfusion: Normal CT perfusion.     1. No acute intracranial or vascular abnormality, no large vessel occlusion. No hemodynamic significant stenosis. Normal perfusion. 2. Emphysema. Electronically signed by BOLA NORIEGA      Thank for the opportunity to participate in the care of this patient. Please contact with questions or concerns.     The above plan of care that has been discussed and agreed upon by Dr. Skelton.    Signed By: MADELAINE Jaime - CLAUDIA     June 17, 2024      A total time of 60 minutes was spent on today's encounter.  Greater than 50% of the time was spent on the following:  Preparing for visit and chart review.  Obtaining and/or reviewing separately obtained history  Performing a medically appropriate exam and/or evaluation  Counseling and educating a patient/family/caregiver as noted above  Referring and communicating with other professionals (not separately reported)  Independently interpreting results (not separately reported) and communicating results to the patient/family/caregiver  Care coordination (not separately reported) as noted above  Documenting clinical information in the electronic health records (e.g. problem list, visit note) on the day of the encounter

## 2024-06-17 NOTE — DISCHARGE INSTRUCTIONS
Discharge Instructions       PATIENT ID: Jose Fuentes  MRN: [id number]   YOB: 1940    DATE OF ADMISSION: 6/14/2024   DATE OF DISCHARGE: 6/17/2024    PRIMARY CARE PROVIDER: Tez Srinivasan     ATTENDING PHYSICIAN: Caitlin Montelongo MD   DISCHARGING PROVIDER: Caitlin Montelongo MD    To contact this individual call [phone] and ask the  to page.   If unavailable ask to be transferred the Adult Hospitalist Department.    DISCHARGE DIAGNOSES TIA, ESBL UTI     CONSULTATIONS: IP CONSULT TO TELE-NEUROLOGY  IP CONSULT TO NEUROLOGY  IP CONSULT TO INFECTIOUS DISEASES    PROCEDURES/SURGERIES: * No surgery found *      PENDING TEST RESULTS:   At the time of discharge the following test results are still pending: None    FOLLOW UP APPOINTMENTS:    Follow-up Information       Follow up With Specialties Details Why Contact Info    Tez Srinivasan, DO Internal Medicine Follow up in 1 week(s) hospital follow-up [address] [facility]  [address]  [facility] [address] [phone]      Dale Rangel MD Urology Follow up  [address]   [facility] [id number] [phone]                ADDITIONAL CARE RECOMMENDATIONS: You were admitted for neurologic changes and were found to have a Transient Ischemic Attack (TIA). You have been started on plavix 75mg daily which you will take for a total of 21 days; after 21 days you may stop this medicine. Please continue baby aspirin 81mg daily and the new cholesterol medication lipitor 40mg nightly.     You were found to have a urinary tract infection. You were given fosfomycin (antibiotic) during admission on 6/17 and you will take this again on 6/20. Please follow-up with Dr Rangel.     DIET: regular diet    ACTIVITY: activity as tolerated    WOUND CARE: None    EQUIPMENT needed: None      DISCHARGE MEDICATIONS:   See Medication Reconciliation Form    It is important that you take the medication exactly as they are prescribed.   Keep your medication in the bottles

## 2024-06-17 NOTE — PLAN OF CARE
Problem: Discharge Planning  Goal: Discharge to home or other facility with appropriate resources  6/17/2024 0455 by Darrion Karimi RN (Aika)  Outcome: Progressing  6/16/2024 2004 by Velia Morales RN  Outcome: Progressing  Flowsheets  Taken 6/16/2024 2000 by Darrion Kariim RN (Aika)  Discharge to home or other facility with appropriate resources:   Identify barriers to discharge with patient and caregiver   Identify discharge learning needs (meds, wound care, etc)  Taken 6/16/2024 0800 by Velia Morales RN  Discharge to home or other facility with appropriate resources: Identify barriers to discharge with patient and caregiver     Problem: Safety - Adult  Goal: Free from fall injury  6/17/2024 0455 by Darrion Karimi RN (Aika)  Outcome: Progressing  6/16/2024 2004 by Velia Morales RN  Outcome: Progressing  Flowsheets  Taken 6/16/2024 2000 by Darrion Karimi RN (Aika)  Free From Fall Injury: Instruct family/caregiver on patient safety  Taken 6/16/2024 0800 by Velia Morales RN  Free From Fall Injury: Instruct family/caregiver on patient safety     Problem: Chronic Conditions and Co-morbidities  Goal: Patient's chronic conditions and co-morbidity symptoms are monitored and maintained or improved  6/17/2024 0455 by Darrion Karimi RN (Aika)  Outcome: Progressing  6/16/2024 2004 by Velia Morales RN  Outcome: Progressing  Flowsheets  Taken 6/16/2024 2000 by Darrion Karimi RN (Aika)  Care Plan - Patient's Chronic Conditions and Co-Morbidity Symptoms are Monitored and Maintained or Improved:   Monitor and assess patient's chronic conditions and comorbid symptoms for stability, deterioration, or improvement   Collaborate with multidisciplinary team to address chronic and comorbid conditions and prevent exacerbation or deterioration   Update acute care plan with appropriate goals if chronic or comorbid symptoms are exacerbated and prevent overall improvement and discharge  Taken 6/16/2024 0800 by Veila Morales RN  Care Plan - Patient's

## 2024-06-17 NOTE — PLAN OF CARE
Problem: Discharge Planning  Goal: Discharge to home or other facility with appropriate resources  6/17/2024 1521 by Carmen Moe RN  Outcome: Completed  Flowsheets (Taken 6/17/2024 0800)  Discharge to home or other facility with appropriate resources:   Identify barriers to discharge with patient and caregiver   Arrange for needed discharge resources and transportation as appropriate  6/17/2024 0455 by Darrion Karimi RN (Aika)  Outcome: Progressing     Problem: Safety - Adult  Goal: Free from fall injury  6/17/2024 1521 by Carmen Moe RN  Outcome: Completed  6/17/2024 0455 by Darrion Karimi RN (Aika)  Outcome: Progressing     Problem: Chronic Conditions and Co-morbidities  Goal: Patient's chronic conditions and co-morbidity symptoms are monitored and maintained or improved  6/17/2024 1521 by Carmen Moe RN  Outcome: Completed  Flowsheets (Taken 6/17/2024 0800)  Care Plan - Patient's Chronic Conditions and Co-Morbidity Symptoms are Monitored and Maintained or Improved:   Monitor and assess patient's chronic conditions and comorbid symptoms for stability, deterioration, or improvement   Collaborate with multidisciplinary team to address chronic and comorbid conditions and prevent exacerbation or deterioration  6/17/2024 0455 by Darrion Karimi RN (Aika)  Outcome: Progressing

## 2024-06-17 NOTE — CARE COORDINATION
Transition of Care Plan:    Home with spouse/family  - No CM needs identified for discharge    Transport: Family     RUR: 13%  Prior Level of Functioning: Independent  Disposition: home  Follow up appointments: PCP  DME needed: none  Transportation at discharge: family  IM/IMM Medicare/ letter given: 6/17  Patient verbalized understanding and gave permission for possible discharge within 4 hours of receiving IMM  Caregiver Contact: Shailesh Fuentes (Child)  [phone] (Mobile)   Discharge Caregiver contacted prior to discharge?   Care Conference needed? No  Barriers to discharge: None    Per ID, Pt to DC home on oral abx. PT/OT cleared Pt for discharge. No CM needs identified.     MT Cronin (Ally)S.W.

## 2024-06-17 NOTE — PROGRESS NOTES
[facility] Adult  Hospitalist Group                                                                                          Hospitalist Progress Note  Edmundo Anaya MD  Answering service: [phone] OR [phone] from in house phone        Date of Service:  2024  NAME:  Jose Fuentes  :  1940  MRN:  [id number]       Admission Summary:   Jose Fuentes is a 84 y.o. male with past medical history significant for CAD s/p stent placement, hypertension, prostate cancer presented at the emergency room with right arm weakness.  Patient initially noticed right arm weakness at about 4 AM this morning.  Patient went about his daily activity including driving his wife to dialysis and later in the day he developed right facial droop.  He was brought to the emergency room as code stroke level 2 alert.  Patient has no prior history of stroke or TIA.  CT of the head without contrast done on arrival at the emergency room negative for acute pathology.  CTA of the head and neck did not show large vessel occlusion.  Patient was seen by teleneurologist at request of ED physician.  Patient was loaded with Plavix and also received aspirin and was referred to the hospitalist service for admission for stroke workup.     Interval history / Subjective:   :   Patient diagnosed with a TIA, discussed pending Echo, that needs to be completed prior to discharge.  Patient states \"don't take it personally, but this is unacceptable and I want to sign out AMA\".  Family at the bedside, will convince patient to stay to complete work-up.  Also discussed the need to take Plavix, he verbalized understanding.  He used to take a statin, but that caused him problems with \"male issues\", I reiterated the need to take it for secondary prophylaxis, and discuss with PCP alternative statins.         6/15:  His symptoms have resolved, he denies any weakness of his RUE, and the left facial droop has resolved as well.   I told him he 
Family gave patient dose of Nubeqa and a VisionShield (Lutein and Zeaxanthin) without nurse's knowledge.    Educated family about not giving patient's meds that we are not aware of.   
Neurocritical Care Code Stroke Documentation      Symptoms: Right arm weakness   Baseline mRS:   0   Last Known Well: 0400 AM    Medical hx: Past Medical History:   Diagnosis Date    Bladder cancer (HCC)     CAD (coronary artery disease)     MI     Cancer (HCC)     prostate s/p xrt    Macular degeneration     MI (myocardial infarction) (HCC) 2004      Vitals: Vitals:    24 1817   BP: (!) 161/77   Pulse: 68   Resp: 18   Temp: 98 °F (36.7 °C)   SpO2: 93%      Anticoagulation: None    VAN:  Negative   NIHSS:   1a-LOC:0    1b-Month/Age:    1c-Open/Close Hand:0    2-Best Gaze:0    3-Visual Fields:0    4-Facial Palsy:0    5a-Left Arm:0    5b-Right Arm:0    6a-Left Le    6b-Right Le    7-Limb Ataxia:0    8-Sensory:0    9-Best Language:0    10-Dysarthria:0    11-Extinction/Inattention:0  TOTAL SCORE:0   Imaging (personally reviewed):   CT Head: No acute pathology     CT Angio Head and Neck: No LVO     CT Perfusion: Normal    Plan:   TNK Candidate: NO    Mechanical thrombectomy Candidate: NO    *Perform dysphagia screening prior to any PO intake*     Discussed with: Tele Neurology, Dr. Connell     Arrival time:     I have spent 30 minutes of critical care time involved in lab review, consultations with specialist, family decision making and documentation. During this entire length of time I was immediately available to the patient.    Allyson Perkins, APRN - CNP  Neurocritical Care Nurse Practitioner    
Occupational Therapy:     Orders received and chart reviewed. Per PT, pt is independent with functional mobility/transfers and completing ADL tasks without restriction. Screened pt and offered services - pt confirming being up ad norberto and reported resolution of admitting symptoms. As pt is at his baseline, acute OT will sign off. No skilled follow up OT needs indicated.     Thank you,   Italia Osman, OTJOSELINE, OTR/L    
PHYSICAL THERAPY EVALUATION/DISCHARGE    Patient: Jose Fuentes (84 y.o. male)  Date: 6/15/2024  Primary Diagnosis: Acute CVA (cerebrovascular accident) ([facility]) [I63.9]  Cerebrovascular accident (CVA), unspecified mechanism ([facility]) [I63.9]       Precautions:                        ASSESSMENT AND RECOMMENDATIONS:  Based on the objective data below, the patient Patient is at his functional baseline s/p TIA/CVA work up. Patient experienced some R hand weakness and a right sided facial droop that have resolved at this time. Incontinent of urine at baseline Patient is ambulating in his room without assistance. Balance test is age appropriate. Reviewed BE FAST signs and symptoms and wrote on the white board. No further PT needs identified. Will sign off. Please re-consult should functional status change.     Functional Outcome Measure:  The patient scored 50/56 on the [place] balance outcome measure which is indicative of lowe fall risk.          Further skilled acute physical therapy is not indicated at this time.       PLAN :  Recommendation for discharge: (in order for the patient to meet his/her long term goals): No skilled physical therapy    Other factors to consider for discharge: no additional factors    IF patient discharges home will need the following DME: none       SUBJECTIVE:   Patient stated “I had trouble picking up my fork.”    OBJECTIVE DATA SUMMARY:     Past Medical History:   Diagnosis Date    Bladder cancer ([facility])     CAD (coronary artery disease)     MI 2004    Cancer ([facility])     prostate s/p xrt    Macular degeneration     MI (myocardial infarction) ([facility]) 01/01/2004     Past Surgical History:   Procedure Laterality Date    APPENDECTOMY  1955    CATARACT REMOVAL      bilateral    [place] UNLISTED PROCEDURE CARDIAC SURGERY      2 Stents    UROLOGICAL SURGERY  6/24/15    RIGHT ROBOTIC PARTIAL NEPHRECTOMY  (LATEX ALLERGY), renal exploration, cystectomy x 3       Home Situation and Prior Level of Function: 
Renal Dosing/Monitoring  Medication: levofloxacin   Current regimen:  750 mg every 24 hr  Recent Labs     06/14/24  1824 06/15/24  0534 06/16/24  0127   CREATININE 1.43* 1.12 1.40*   BUN 28* 22* 28*     Estimated CrCl:  40 ml/min  Plan: Change to 750 mg IV every 48 hours per [facility] P&T Committee Protocol with respect to renal function.  Pharmacy will continue to monitor patient daily and will make dosage adjustments based upon changing renal function.    
SLP Contact Note    Consult received and appreciated. Pt chart reviewed. MRI negative for acute infarct. Pt passed swallow screen. NIH 1. No history of dysphagia. No SLP needs. Will sign off.  Please reconsult should SLP be of any assistance.      Thank you,  Lisa Robertson M.Ed, CCC-SLP  Speech-Language Pathologist    
Stroke Education provided to patient and relative(s) and the following topics were discussed    1. Patients personal risk factors for stroke are {risk factors for stroke:72835:a}    2. Warning signs of Stroke:        * Sudden numbness or weakness of the face, arm or leg, especially on one side of          The body            * Sudden confusion, trouble speaking or understanding        * Sudden trouble seeing in one or both eyes        * Sudden trouble walking, dizziness, loss of balance or coordination        * Sudden severe headache with no known cause      3. Importance of activation Emergency Medical Services ( 9-1-1 ) immediately if experience any warning signs of stroke.    4. Be sure and schedule a follow-up appointment with your primary care doctor or any specialists as instructed.     5. You must take medicine every day to treat your risk factors for stroke.  Be sure to take your medicines exactly as your doctor tells you: no more, no less.  Know what your medicines are for , what they do.  Anti-thrombotics /anticoagulants can help prevent strokes.  You are taking the following medicine(s)  plavix     6.  Smoking and second-hand smoke greatly increase your risk of stroke, cardiovascular disease and death. Smoking history never    7. Information provided was {[facility] IP STROKE EDUCATION INFORMATION PROVIDED WAS:54273}    8. Documentation of teaching completed in Patient Education Activity and on Care Plan with teaching response noted?  {yes no:22764}   
TTE resulted today. EF 55-60%. HgbA1C 5.1. No additional recommendations outside of those from 6/15/24.  
file   Social Connections: Feeling Socially Integrated (7/28/2023)    OASIS : Social Isolation     Frequency of experiencing loneliness or isolation: Never   Intimate Partner Violence: Not on file   Depression: Not at risk (6/30/2023)    PHQ-2     PHQ-2 Score: 0   Housing Stability: Unknown (6/30/2023)    Housing Stability Vital Sign     Unable to Pay for Housing in the Last Year: Not on file     Number of Places Lived in the Last Year: Not on file     Unstable Housing in the Last Year: No   Interpersonal Safety: Not At Risk (6/14/2024)    Interpersonal Safety Domain Source: IP Abuse Screening     Physical abuse: Denies     Verbal abuse: Denies     Emotional abuse: Denies     Financial abuse: Denies     Sexual abuse: Denies   Utilities: Not on file       Review of Systems:   Pertinent items are noted in HPI.       Vital Signs:    Last 24hrs VS reviewed since prior progress note. Most recent are:  Vitals:    06/15/24 1425   BP: (!) 115/42   Pulse: 69   Resp: 29   Temp:    SpO2:        No intake or output data in the 24 hours ending 06/15/24 1645     Physical Examination:     I had a face to face encounter with this patient and independently examined them on 6/15/2024 as outlined below:          General : alert x 3, awake, no acute distress,   HEENT: PEERL, EOMI, moist mucus membrane, TM clear  Neck: supple, no JVD, no meningeal signs  Chest: Clear to auscultation bilaterally   CVS: S1 S2 heard, Capillary refill less than 2 seconds  Abd: soft/ non tender, non distended, BS physiological,   Ext: no clubbing, no cyanosis, no edema, brisk 2+ DP pulses  Neuro/Psych: pleasant mood and affect, CN 2-12 grossly intact, sensory grossly within normal limit, Strength 5/5 in all extremities, DTR 1+ x 4  Skin: warm     Data Review:    Review and/or order of clinical lab test  Review and/or order of tests in the radiology section of CPT  Review and/or order of tests in the medicine section of CPT      I have personally and

## 2024-07-05 ENCOUNTER — OFFICE VISIT (OUTPATIENT)
Age: 84
End: 2024-07-05
Payer: MEDICARE

## 2024-07-05 VITALS
HEIGHT: 69 IN | OXYGEN SATURATION: 97 % | BODY MASS INDEX: 24.59 KG/M2 | SYSTOLIC BLOOD PRESSURE: 110 MMHG | DIASTOLIC BLOOD PRESSURE: 64 MMHG | HEART RATE: 78 BPM | WEIGHT: 166 LBS

## 2024-07-05 DIAGNOSIS — I10 ESSENTIAL (PRIMARY) HYPERTENSION: ICD-10-CM

## 2024-07-05 DIAGNOSIS — Z09 HOSPITAL DISCHARGE FOLLOW-UP: Primary | ICD-10-CM

## 2024-07-05 DIAGNOSIS — F41.9 ANXIETY: ICD-10-CM

## 2024-07-05 DIAGNOSIS — Z86.73 HISTORY OF TRANSIENT ISCHEMIC ATTACK (TIA): ICD-10-CM

## 2024-07-05 PROBLEM — J96.91 RESPIRATORY FAILURE WITH HYPOXIA (HCC): Status: RESOLVED | Noted: 2022-11-24 | Resolved: 2024-07-05

## 2024-07-05 PROCEDURE — G8420 CALC BMI NORM PARAMETERS: HCPCS | Performed by: INTERNAL MEDICINE

## 2024-07-05 PROCEDURE — 1111F DSCHRG MED/CURRENT MED MERGE: CPT | Performed by: INTERNAL MEDICINE

## 2024-07-05 PROCEDURE — G8427 DOCREV CUR MEDS BY ELIG CLIN: HCPCS | Performed by: INTERNAL MEDICINE

## 2024-07-05 PROCEDURE — 3078F DIAST BP <80 MM HG: CPT | Performed by: INTERNAL MEDICINE

## 2024-07-05 PROCEDURE — 1123F ACP DISCUSS/DSCN MKR DOCD: CPT | Performed by: INTERNAL MEDICINE

## 2024-07-05 PROCEDURE — 99214 OFFICE O/P EST MOD 30 MIN: CPT | Performed by: INTERNAL MEDICINE

## 2024-07-05 PROCEDURE — 1036F TOBACCO NON-USER: CPT | Performed by: INTERNAL MEDICINE

## 2024-07-05 PROCEDURE — 3074F SYST BP LT 130 MM HG: CPT | Performed by: INTERNAL MEDICINE

## 2024-07-05 RX ORDER — LORAZEPAM 0.5 MG/1
0.5 TABLET ORAL DAILY PRN
Qty: 30 TABLET | Refills: 0 | Status: SHIPPED | OUTPATIENT
Start: 2024-07-05 | End: 2024-08-04

## 2024-07-05 SDOH — ECONOMIC STABILITY: FOOD INSECURITY: WITHIN THE PAST 12 MONTHS, YOU WORRIED THAT YOUR FOOD WOULD RUN OUT BEFORE YOU GOT MONEY TO BUY MORE.: NEVER TRUE

## 2024-07-05 SDOH — ECONOMIC STABILITY: INCOME INSECURITY: HOW HARD IS IT FOR YOU TO PAY FOR THE VERY BASICS LIKE FOOD, HOUSING, MEDICAL CARE, AND HEATING?: NOT HARD AT ALL

## 2024-07-05 SDOH — ECONOMIC STABILITY: FOOD INSECURITY: WITHIN THE PAST 12 MONTHS, THE FOOD YOU BOUGHT JUST DIDN'T LAST AND YOU DIDN'T HAVE MONEY TO GET MORE.: NEVER TRUE

## 2024-07-05 ASSESSMENT — PATIENT HEALTH QUESTIONNAIRE - PHQ9
1. LITTLE INTEREST OR PLEASURE IN DOING THINGS: NOT AT ALL
SUM OF ALL RESPONSES TO PHQ9 QUESTIONS 1 & 2: 0
SUM OF ALL RESPONSES TO PHQ QUESTIONS 1-9: 0
SUM OF ALL RESPONSES TO PHQ QUESTIONS 1-9: 0
2. FEELING DOWN, DEPRESSED OR HOPELESS: NOT AT ALL
SUM OF ALL RESPONSES TO PHQ QUESTIONS 1-9: 0
SUM OF ALL RESPONSES TO PHQ QUESTIONS 1-9: 0

## 2024-07-05 ASSESSMENT — ENCOUNTER SYMPTOMS: RESPIRATORY NEGATIVE: 1

## 2024-07-05 NOTE — PROGRESS NOTES
Subjective    Jose Fuentes is a 84 y.o. male who presents today for the following:  Chief Complaint   Patient presents with    Follow-Up from Hospital       History of Present Illness  The patient presents for a hospital follow-up. Admitted 6/14-6/17 and contacted by a nurse on 6/18    The patient was hospitalized at [facility] for approximately 4 days due to suspected stroke, as reported by his son and daughter-in-law. He reported experiencing right arm weakness and a right-sided facial droop. His wife, who is undergoing dialysis, noticed these symptoms later. An MRI and CT scan of the head were performed, revealing no hemorrhage or infarct. The patient's symptoms have since resolved, and he denies experiencing any weakness or right facial droop. This is the first occurrence of such symptoms. An echocardiogram was performed, and his cholesterol levels were monitored to rule out arterial blockage. A neurologist was also consulted. The patient was diagnosed with a urinary tract infection (UTI) with E. coli, for which he was treated with intravenous antibiotics. His current medications include atorvastatin, Plavix, and baby aspirin 81 mg. He was advised to follow up with his urologist and primary care physician. The patient denies any current chest pain or visual changes. He was previously prescribed Ativan, which he found beneficial, but discontinued its use. Reports a lot of stress and anxiety working with his son.        PMH/PSH/Allergies/Social History/medication list and most recent studies reviewed with patient.     reports that he quit smoking about 19 months ago. His smoking use included cigarettes. He started smoking about 60 years ago. He has a 29.4 pack-year smoking history. He has been exposed to tobacco smoke. He has never used smokeless tobacco.    reports no history of alcohol use.   Results  Laboratory Studies  E. coli UTI treated with IV antibiotics.    Imaging  MRI and CT of the head showed no

## 2024-07-05 NOTE — PROGRESS NOTES
Chief Complaint   Patient presents with    Follow-Up from Hospital     \"Have you been to the ER, urgent care clinic since your last visit?  Hospitalized since your last visit?\"    YES - When: approximately 3  weeks ago.  Where and Why: [facility].    “Have you seen or consulted any other health care providers outside of [facility] since your last visit?”    NO            Click Here for Release of Records Request

## 2024-08-09 ENCOUNTER — HOSPITAL ENCOUNTER (INPATIENT)
Facility: HOSPITAL | Age: 84
LOS: 3 days | Discharge: HOME | DRG: 686 | End: 2024-08-13
Attending: EMERGENCY MEDICINE | Admitting: FAMILY MEDICINE
Payer: MEDICARE

## 2024-08-09 DIAGNOSIS — N13.30 HYDRONEPHROSIS OF LEFT KIDNEY: ICD-10-CM

## 2024-08-09 DIAGNOSIS — K62.5 RECTAL BLEEDING: Primary | ICD-10-CM

## 2024-08-09 DIAGNOSIS — C67.9 MALIGNANT NEOPLASM OF URINARY BLADDER, UNSPECIFIED SITE (HCC): ICD-10-CM

## 2024-08-09 DIAGNOSIS — N17.9 ACUTE RENAL FAILURE, UNSPECIFIED ACUTE RENAL FAILURE TYPE (HCC): ICD-10-CM

## 2024-08-09 LAB
BASOPHILS # BLD: 0 K/UL (ref 0–0.1)
BASOPHILS NFR BLD: 0 % (ref 0–1)
COMMENT:: NORMAL
DIFFERENTIAL METHOD BLD: ABNORMAL
EOSINOPHIL # BLD: 0.1 K/UL (ref 0–0.4)
EOSINOPHIL NFR BLD: 1 % (ref 0–7)
ERYTHROCYTE [DISTWIDTH] IN BLOOD BY AUTOMATED COUNT: 13.3 % (ref 11.5–14.5)
HCT VFR BLD AUTO: 34.1 % (ref 36.6–50.3)
HGB BLD-MCNC: 11.1 G/DL (ref 12.1–17)
IMM GRANULOCYTES # BLD AUTO: 0.1 K/UL (ref 0–0.04)
IMM GRANULOCYTES NFR BLD AUTO: 1 % (ref 0–0.5)
LYMPHOCYTES # BLD: 0.9 K/UL (ref 0.8–3.5)
LYMPHOCYTES NFR BLD: 6 % (ref 12–49)
MCH RBC QN AUTO: 31.1 PG (ref 26–34)
MCHC RBC AUTO-ENTMCNC: 32.6 G/DL (ref 30–36.5)
MCV RBC AUTO: 95.5 FL (ref 80–99)
MONOCYTES # BLD: 0.6 K/UL (ref 0–1)
MONOCYTES NFR BLD: 4 % (ref 5–13)
NEUTS SEG # BLD: 13.3 K/UL (ref 1.8–8)
NEUTS SEG NFR BLD: 88 % (ref 32–75)
NRBC # BLD: 0 K/UL (ref 0–0.01)
NRBC BLD-RTO: 0 PER 100 WBC
PLATELET # BLD AUTO: 217 K/UL (ref 150–400)
PMV BLD AUTO: 9.8 FL (ref 8.9–12.9)
RBC # BLD AUTO: 3.57 M/UL (ref 4.1–5.7)
SPECIMEN HOLD: NORMAL
WBC # BLD AUTO: 15.1 K/UL (ref 4.1–11.1)

## 2024-08-09 PROCEDURE — 86901 BLOOD TYPING SEROLOGIC RH(D): CPT

## 2024-08-09 PROCEDURE — 99285 EMERGENCY DEPT VISIT HI MDM: CPT

## 2024-08-09 PROCEDURE — 86850 RBC ANTIBODY SCREEN: CPT

## 2024-08-09 PROCEDURE — 83880 ASSAY OF NATRIURETIC PEPTIDE: CPT

## 2024-08-09 PROCEDURE — 93005 ELECTROCARDIOGRAM TRACING: CPT | Performed by: EMERGENCY MEDICINE

## 2024-08-09 PROCEDURE — 85025 COMPLETE CBC W/AUTO DIFF WBC: CPT

## 2024-08-09 PROCEDURE — 80053 COMPREHEN METABOLIC PANEL: CPT

## 2024-08-09 PROCEDURE — 36415 COLL VENOUS BLD VENIPUNCTURE: CPT

## 2024-08-09 PROCEDURE — 86900 BLOOD TYPING SEROLOGIC ABO: CPT

## 2024-08-09 ASSESSMENT — PAIN SCALES - GENERAL: PAINLEVEL_OUTOF10: 0

## 2024-08-09 ASSESSMENT — PAIN - FUNCTIONAL ASSESSMENT: PAIN_FUNCTIONAL_ASSESSMENT: 0-10

## 2024-08-10 ENCOUNTER — APPOINTMENT (OUTPATIENT)
Facility: HOSPITAL | Age: 84
DRG: 686 | End: 2024-08-10
Payer: MEDICARE

## 2024-08-10 PROBLEM — N13.1 HYDRONEPHROSIS DUE TO OBSTRUCTION OF URETER: Status: ACTIVE | Noted: 2024-08-10

## 2024-08-10 LAB
ABO + RH BLD: NORMAL
ALBUMIN SERPL-MCNC: 2.8 G/DL (ref 3.5–5)
ALBUMIN SERPL-MCNC: 3.1 G/DL (ref 3.5–5)
ALBUMIN/GLOB SERPL: 0.8 (ref 1.1–2.2)
ALBUMIN/GLOB SERPL: 0.8 (ref 1.1–2.2)
ALP SERPL-CCNC: 62 U/L (ref 45–117)
ALP SERPL-CCNC: 73 U/L (ref 45–117)
ALT SERPL-CCNC: 10 U/L (ref 12–78)
ALT SERPL-CCNC: 11 U/L (ref 12–78)
ANION GAP SERPL CALC-SCNC: 13 MMOL/L (ref 5–15)
ANION GAP SERPL CALC-SCNC: 13 MMOL/L (ref 5–15)
APPEARANCE UR: CLEAR
APTT PPP: 29.8 SEC (ref 22.1–31)
AST SERPL-CCNC: 12 U/L (ref 15–37)
AST SERPL-CCNC: 15 U/L (ref 15–37)
BACTERIA URNS QL MICRO: NEGATIVE /HPF
BASOPHILS # BLD: 0 K/UL (ref 0–0.1)
BASOPHILS NFR BLD: 0 % (ref 0–1)
BILIRUB SERPL-MCNC: 0.3 MG/DL (ref 0.2–1)
BILIRUB SERPL-MCNC: 0.4 MG/DL (ref 0.2–1)
BILIRUB UR QL: NEGATIVE
BLOOD GROUP ANTIBODIES SERPL: NORMAL
BUN SERPL-MCNC: 99 MG/DL (ref 6–20)
BUN SERPL-MCNC: 99 MG/DL (ref 6–20)
BUN/CREAT SERPL: 8 (ref 12–20)
BUN/CREAT SERPL: 8 (ref 12–20)
CALCIUM SERPL-MCNC: 10.2 MG/DL (ref 8.5–10.1)
CALCIUM SERPL-MCNC: 9.7 MG/DL (ref 8.5–10.1)
CHLORIDE SERPL-SCNC: 110 MMOL/L (ref 97–108)
CHLORIDE SERPL-SCNC: 110 MMOL/L (ref 97–108)
CO2 SERPL-SCNC: 15 MMOL/L (ref 21–32)
CO2 SERPL-SCNC: 15 MMOL/L (ref 21–32)
COLOR UR: ABNORMAL
CREAT SERPL-MCNC: 11.9 MG/DL (ref 0.7–1.3)
CREAT SERPL-MCNC: 12.1 MG/DL (ref 0.7–1.3)
DIFFERENTIAL METHOD BLD: ABNORMAL
EOSINOPHIL # BLD: 0.1 K/UL (ref 0–0.4)
EOSINOPHIL NFR BLD: 0 % (ref 0–7)
EPITH CASTS URNS QL MICRO: ABNORMAL /LPF
ERYTHROCYTE [DISTWIDTH] IN BLOOD BY AUTOMATED COUNT: 13.4 % (ref 11.5–14.5)
FOLATE SERPL-MCNC: 12.3 NG/ML (ref 5–21)
GLOBULIN SER CALC-MCNC: 3.3 G/DL (ref 2–4)
GLOBULIN SER CALC-MCNC: 4 G/DL (ref 2–4)
GLUCOSE SERPL-MCNC: 108 MG/DL (ref 65–100)
GLUCOSE SERPL-MCNC: 121 MG/DL (ref 65–100)
GLUCOSE UR STRIP.AUTO-MCNC: NEGATIVE MG/DL
HCT VFR BLD AUTO: 29.8 % (ref 36.6–50.3)
HGB BLD-MCNC: 9.8 G/DL (ref 12.1–17)
HGB UR QL STRIP: ABNORMAL
IMM GRANULOCYTES # BLD AUTO: 0.1 K/UL (ref 0–0.04)
IMM GRANULOCYTES NFR BLD AUTO: 1 % (ref 0–0.5)
INR PPP: 1.1 (ref 0.9–1.1)
IRON SATN MFR SERPL: 16 % (ref 20–50)
IRON SERPL-MCNC: 25 UG/DL (ref 35–150)
KETONES UR QL STRIP.AUTO: NEGATIVE MG/DL
LEUKOCYTE ESTERASE UR QL STRIP.AUTO: ABNORMAL
LYMPHOCYTES # BLD: 1 K/UL (ref 0.8–3.5)
LYMPHOCYTES NFR BLD: 7 % (ref 12–49)
MCH RBC QN AUTO: 31.5 PG (ref 26–34)
MCHC RBC AUTO-ENTMCNC: 32.9 G/DL (ref 30–36.5)
MCV RBC AUTO: 95.8 FL (ref 80–99)
MONOCYTES # BLD: 0.7 K/UL (ref 0–1)
MONOCYTES NFR BLD: 5 % (ref 5–13)
NEUTS SEG # BLD: 11.9 K/UL (ref 1.8–8)
NEUTS SEG NFR BLD: 87 % (ref 32–75)
NITRITE UR QL STRIP.AUTO: NEGATIVE
NRBC # BLD: 0 K/UL (ref 0–0.01)
NRBC BLD-RTO: 0 PER 100 WBC
NT PRO BNP: ABNORMAL PG/ML
PH UR STRIP: 5 (ref 5–8)
PHOSPHATE SERPL-MCNC: 5.4 MG/DL (ref 2.6–4.7)
PLATELET # BLD AUTO: 189 K/UL (ref 150–400)
PMV BLD AUTO: 9.9 FL (ref 8.9–12.9)
POTASSIUM SERPL-SCNC: 5.1 MMOL/L (ref 3.5–5.1)
POTASSIUM SERPL-SCNC: 5.2 MMOL/L (ref 3.5–5.1)
PROT SERPL-MCNC: 6.1 G/DL (ref 6.4–8.2)
PROT SERPL-MCNC: 7.1 G/DL (ref 6.4–8.2)
PROT UR STRIP-MCNC: 100 MG/DL
PROTHROMBIN TIME: 11.9 SEC (ref 9–11.1)
RBC # BLD AUTO: 3.11 M/UL (ref 4.1–5.7)
RBC #/AREA URNS HPF: >100 /HPF (ref 0–5)
SODIUM SERPL-SCNC: 138 MMOL/L (ref 136–145)
SODIUM SERPL-SCNC: 138 MMOL/L (ref 136–145)
SP GR UR REFRACTOMETRY: 1.01 (ref 1–1.03)
SPECIMEN EXP DATE BLD: NORMAL
SPECIMEN HOLD: NORMAL
THERAPEUTIC RANGE: NORMAL SECS (ref 58–77)
TIBC SERPL-MCNC: 161 UG/DL (ref 250–450)
UROBILINOGEN UR QL STRIP.AUTO: 0.2 EU/DL (ref 0.2–1)
VIT B12 SERPL-MCNC: 612 PG/ML (ref 193–986)
WBC # BLD AUTO: 13.7 K/UL (ref 4.1–11.1)
WBC URNS QL MICRO: ABNORMAL /HPF (ref 0–4)

## 2024-08-10 PROCEDURE — 83550 IRON BINDING TEST: CPT

## 2024-08-10 PROCEDURE — 74176 CT ABD & PELVIS W/O CONTRAST: CPT

## 2024-08-10 PROCEDURE — 6370000000 HC RX 637 (ALT 250 FOR IP): Performed by: EMERGENCY MEDICINE

## 2024-08-10 PROCEDURE — 82607 VITAMIN B-12: CPT

## 2024-08-10 PROCEDURE — 6360000002 HC RX W HCPCS: Performed by: STUDENT IN AN ORGANIZED HEALTH CARE EDUCATION/TRAINING PROGRAM

## 2024-08-10 PROCEDURE — 0T9430Z DRAINAGE OF LEFT KIDNEY PELVIS WITH DRAINAGE DEVICE, PERCUTANEOUS APPROACH: ICD-10-PCS | Performed by: STUDENT IN AN ORGANIZED HEALTH CARE EDUCATION/TRAINING PROGRAM

## 2024-08-10 PROCEDURE — 6360000004 HC RX CONTRAST MEDICATION: Performed by: STUDENT IN AN ORGANIZED HEALTH CARE EDUCATION/TRAINING PROGRAM

## 2024-08-10 PROCEDURE — 83540 ASSAY OF IRON: CPT

## 2024-08-10 PROCEDURE — 6370000000 HC RX 637 (ALT 250 FOR IP): Performed by: FAMILY MEDICINE

## 2024-08-10 PROCEDURE — 36415 COLL VENOUS BLD VENIPUNCTURE: CPT

## 2024-08-10 PROCEDURE — 6370000000 HC RX 637 (ALT 250 FOR IP): Performed by: INTERNAL MEDICINE

## 2024-08-10 PROCEDURE — 2060000000 HC ICU INTERMEDIATE R&B

## 2024-08-10 PROCEDURE — 2580000003 HC RX 258: Performed by: FAMILY MEDICINE

## 2024-08-10 PROCEDURE — 2500000003 HC RX 250 WO HCPCS: Performed by: STUDENT IN AN ORGANIZED HEALTH CARE EDUCATION/TRAINING PROGRAM

## 2024-08-10 PROCEDURE — 6360000002 HC RX W HCPCS: Performed by: FAMILY MEDICINE

## 2024-08-10 PROCEDURE — 85610 PROTHROMBIN TIME: CPT

## 2024-08-10 PROCEDURE — 2580000003 HC RX 258: Performed by: STUDENT IN AN ORGANIZED HEALTH CARE EDUCATION/TRAINING PROGRAM

## 2024-08-10 PROCEDURE — BT1F1ZZ FLUOROSCOPY OF LEFT KIDNEY, URETER AND BLADDER USING LOW OSMOLAR CONTRAST: ICD-10-PCS | Performed by: STUDENT IN AN ORGANIZED HEALTH CARE EDUCATION/TRAINING PROGRAM

## 2024-08-10 PROCEDURE — 71045 X-RAY EXAM CHEST 1 VIEW: CPT

## 2024-08-10 PROCEDURE — 82746 ASSAY OF FOLIC ACID SERUM: CPT

## 2024-08-10 PROCEDURE — 2580000003 HC RX 258: Performed by: INTERNAL MEDICINE

## 2024-08-10 PROCEDURE — 84100 ASSAY OF PHOSPHORUS: CPT

## 2024-08-10 PROCEDURE — 81001 URINALYSIS AUTO W/SCOPE: CPT

## 2024-08-10 PROCEDURE — 80053 COMPREHEN METABOLIC PANEL: CPT

## 2024-08-10 PROCEDURE — 85025 COMPLETE CBC W/AUTO DIFF WBC: CPT

## 2024-08-10 PROCEDURE — 85730 THROMBOPLASTIN TIME PARTIAL: CPT

## 2024-08-10 PROCEDURE — 2709999900 IR GUIDED NEPHROSTOMY CATH PLACEMENT LEFT

## 2024-08-10 RX ORDER — ATORVASTATIN CALCIUM 40 MG/1
40 TABLET, FILM COATED ORAL NIGHTLY
Status: DISCONTINUED | OUTPATIENT
Start: 2024-08-10 | End: 2024-08-13 | Stop reason: HOSPADM

## 2024-08-10 RX ORDER — ONDANSETRON 4 MG/1
4 TABLET, ORALLY DISINTEGRATING ORAL EVERY 8 HOURS PRN
Status: DISCONTINUED | OUTPATIENT
Start: 2024-08-10 | End: 2024-08-13 | Stop reason: HOSPADM

## 2024-08-10 RX ORDER — SODIUM BICARBONATE 650 MG/1
650 TABLET ORAL 3 TIMES DAILY
Status: DISCONTINUED | OUTPATIENT
Start: 2024-08-10 | End: 2024-08-12

## 2024-08-10 RX ORDER — ACETAMINOPHEN 325 MG/1
650 TABLET ORAL EVERY 6 HOURS PRN
Status: DISCONTINUED | OUTPATIENT
Start: 2024-08-10 | End: 2024-08-13 | Stop reason: HOSPADM

## 2024-08-10 RX ORDER — LIDOCAINE HYDROCHLORIDE 10 MG/ML
INJECTION, SOLUTION EPIDURAL; INFILTRATION; INTRACAUDAL; PERINEURAL PRN
Status: COMPLETED | OUTPATIENT
Start: 2024-08-10 | End: 2024-08-10

## 2024-08-10 RX ORDER — ONDANSETRON 2 MG/ML
4 INJECTION INTRAMUSCULAR; INTRAVENOUS EVERY 6 HOURS PRN
Status: DISCONTINUED | OUTPATIENT
Start: 2024-08-10 | End: 2024-08-13 | Stop reason: HOSPADM

## 2024-08-10 RX ORDER — SODIUM CHLORIDE 9 MG/ML
INJECTION, SOLUTION INTRAVENOUS CONTINUOUS
Status: DISCONTINUED | OUTPATIENT
Start: 2024-08-10 | End: 2024-08-11 | Stop reason: HOSPADM

## 2024-08-10 RX ORDER — SODIUM CHLORIDE 0.9 % (FLUSH) 0.9 %
5-40 SYRINGE (ML) INJECTION EVERY 12 HOURS SCHEDULED
Status: DISCONTINUED | OUTPATIENT
Start: 2024-08-10 | End: 2024-08-13 | Stop reason: HOSPADM

## 2024-08-10 RX ORDER — SORBITOL SOLUTION 70 %
60 SOLUTION, ORAL MISCELLANEOUS ONCE
Status: COMPLETED | OUTPATIENT
Start: 2024-08-10 | End: 2024-08-10

## 2024-08-10 RX ORDER — BISACODYL 5 MG/1
5 TABLET, DELAYED RELEASE ORAL DAILY
Status: DISCONTINUED | OUTPATIENT
Start: 2024-08-10 | End: 2024-08-13 | Stop reason: HOSPADM

## 2024-08-10 RX ORDER — FENTANYL CITRATE 50 UG/ML
INJECTION, SOLUTION INTRAMUSCULAR; INTRAVENOUS PRN
Status: COMPLETED | OUTPATIENT
Start: 2024-08-10 | End: 2024-08-10

## 2024-08-10 RX ORDER — MIDAZOLAM HYDROCHLORIDE 2 MG/2ML
INJECTION, SOLUTION INTRAMUSCULAR; INTRAVENOUS PRN
Status: COMPLETED | OUTPATIENT
Start: 2024-08-10 | End: 2024-08-10

## 2024-08-10 RX ORDER — ACETAMINOPHEN 650 MG/1
650 SUPPOSITORY RECTAL EVERY 6 HOURS PRN
Status: DISCONTINUED | OUTPATIENT
Start: 2024-08-10 | End: 2024-08-13 | Stop reason: HOSPADM

## 2024-08-10 RX ORDER — SODIUM CHLORIDE 0.9 % (FLUSH) 0.9 %
5-40 SYRINGE (ML) INJECTION PRN
Status: DISCONTINUED | OUTPATIENT
Start: 2024-08-10 | End: 2024-08-13 | Stop reason: HOSPADM

## 2024-08-10 RX ORDER — SODIUM CHLORIDE 9 MG/ML
INJECTION, SOLUTION INTRAVENOUS PRN
Status: DISCONTINUED | OUTPATIENT
Start: 2024-08-10 | End: 2024-08-13 | Stop reason: HOSPADM

## 2024-08-10 RX ADMIN — MIDAZOLAM HYDROCHLORIDE 1 MG: 1 INJECTION, SOLUTION INTRAMUSCULAR; INTRAVENOUS at 10:35

## 2024-08-10 RX ADMIN — SODIUM BICARBONATE 650 MG: 650 TABLET ORAL at 22:02

## 2024-08-10 RX ADMIN — ALUMINUM HYDROXIDE, MAGNESIUM HYDROXIDE, AND SIMETHICONE 40 ML: 1200; 120; 1200 SUSPENSION ORAL at 00:26

## 2024-08-10 RX ADMIN — FENTANYL CITRATE 50 MCG: 0.05 INJECTION, SOLUTION INTRAMUSCULAR; INTRAVENOUS at 10:35

## 2024-08-10 RX ADMIN — ATORVASTATIN CALCIUM 40 MG: 40 TABLET, FILM COATED ORAL at 21:52

## 2024-08-10 RX ADMIN — ONDANSETRON 4 MG: 2 INJECTION INTRAMUSCULAR; INTRAVENOUS at 16:59

## 2024-08-10 RX ADMIN — SODIUM CHLORIDE: 9 INJECTION, SOLUTION INTRAVENOUS at 20:31

## 2024-08-10 RX ADMIN — SODIUM CHLORIDE, PRESERVATIVE FREE 10 ML: 5 INJECTION INTRAVENOUS at 13:52

## 2024-08-10 RX ADMIN — SODIUM CHLORIDE, PRESERVATIVE FREE 10 ML: 5 INJECTION INTRAVENOUS at 21:57

## 2024-08-10 RX ADMIN — SODIUM ZIRCONIUM CYCLOSILICATE 10 G: 10 POWDER, FOR SUSPENSION ORAL at 13:52

## 2024-08-10 RX ADMIN — WATER 1000 MG: 1 INJECTION INTRAMUSCULAR; INTRAVENOUS; SUBCUTANEOUS at 16:54

## 2024-08-10 RX ADMIN — BISACODYL 5 MG: 5 TABLET, COATED ORAL at 16:54

## 2024-08-10 RX ADMIN — LIDOCAINE HYDROCHLORIDE 10 ML: 10 INJECTION, SOLUTION EPIDURAL; INFILTRATION; INTRACAUDAL; PERINEURAL at 10:37

## 2024-08-10 RX ADMIN — POLYETHYLENE GLYCOL-3350 AND ELECTROLYTES 2000 ML: 236; 6.74; 5.86; 2.97; 22.74 POWDER, FOR SOLUTION ORAL at 20:32

## 2024-08-10 RX ADMIN — SORBITOL SOLUTION (BULK) 60 ML: 70 SOLUTION at 18:21

## 2024-08-10 RX ADMIN — IOPAMIDOL 14 ML: 755 INJECTION, SOLUTION INTRAVENOUS at 10:42

## 2024-08-10 RX ADMIN — SODIUM BICARBONATE 650 MG: 650 TABLET ORAL at 13:52

## 2024-08-10 ASSESSMENT — PULMONARY FUNCTION TESTS
PIF_VALUE: 0

## 2024-08-10 ASSESSMENT — PAIN SCALES - GENERAL: PAINLEVEL_OUTOF10: 0

## 2024-08-10 NOTE — ED NOTES
Patient reports that he is incontinent and is refusing a straight cath and external catheter options. Pt educated that one of these is necessary to obtain a urinalysis. Pt states \"I see a urologist at [facility], if I am still having the problem later I will go see [the urologist]. I only want him to do it.\" MD notified.

## 2024-08-10 NOTE — CONSULTS
Consultation Note    NAME: Jose Fuentes   :  1940   MRN:  [id number]     Date/Time:  8/10/2024 1:19 PM    I have been asked to see this patient by Dr. Cheek  for advice/opinion re: STACY.     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  S/P PCN.    There is no acute need for HD at this time.  Hopefully, creatinine will improve with relief of obstruction.      I will start oral sodium bicarb for acidosis    Give lokelma today for mildly elevated potassium.     I had a long talk with the pt, wife and family (who are also my patients).  I told them that I'm hopeful that renal function will improve.  If not he would require HD but given his other medical issues I'm not sure that he would do well with that.       Subjective:   CHIEF COMPLAINT:  \"They put in a tube.\"    HISTORY OF PRESENT ILLNESS:     Jose is a 84 y.o.   male who has a history of CKD-3a with a creatinine of 1.4 in 2024 admitted with STACY.  He says that he noted some pain in his left flank for the past several days.  As well he has had leg edema.  His family says that he has been having these symptoms longer and that they have urged him to seek medical care but he has been reluctant to do so.    Past Medical History:   Diagnosis Date    Bladder cancer (HCC)     CAD (coronary artery disease)     MI     Cancer (HCC)     prostate s/p xrt    Macular degeneration     MI (myocardial infarction) (HCC) 2004      Past Surgical History:   Procedure Laterality Date    APPENDECTOMY      CATARACT REMOVAL      bilateral    [place] UNLISTED PROCEDURE CARDIAC SURGERY      2 Stents    UROLOGICAL SURGERY  6/24/15    RIGHT ROBOTIC PARTIAL NEPHRECTOMY  (LATEX ALLERGY), renal exploration, cystectomy x 3     Social History

## 2024-08-10 NOTE — H&P
failure, unspecified (HCC)      Edema of both lower extremities  -proBNP = 10,524  -no diuretics ordered due to ARF on CKD  -considered for guideline directed medical therapy but limited due to ARF  -order TTE   -consult cardiologist  -strict Is & Os  -daily weights    6.  Prostate cancer  -brachytherapy seeds in the prostate, large dense calculus measuring 1.5 cm but 2.4 cm x 2.8 cm probably in prostate urethra present since 2022  -consult urologist    7.  Leukocytosis unspecified      Lymphocytosis  -WBC 15.1, neutrophils 88%  -Lymphocytes 6%, monocytes 4%  -Repeat WBC  -Unable to obtain a UA (to rule out UTI) for reasons stated  -No definite infection noted.  As such, no antibiotics ordered    8.  Normocytic normochromic anemia  -Hemoglobin 11.1  -Repeat H&H  -Transfuse if hemoglobin less than 7.0    9.  History of CAD  -On dual antiplatelet therapy-with aspirin 81 mg p.o. daily and Plavix 75 mg p.o. daily; (HOLD DUE TO RECTAL BLEEDING AND HEMATURIA)  -Continuous cardiac monitoring    10.   Unsteady gait          Generalized weakness  -consult PT/OT  -ambulate with assistance only    DIET: Diet NPO   ISOLATION PRECAUTIONS: No active isolations  CODE STATUS: Full Code   Central Line:   none  DVT PROPHYLAXIS: SCDs  FUNCTIONAL STATUS PRIOR TO HOSPITALIZATION: Ambulatory and capable of self-care but relies on assistive devices (rolling walker/cane).   Ambulatory status/function: Ambulates with assistance:  Cane   EARLY MOBILITY ASSESSMENT: Recommend routine ambulation while hospitalized with the assistance of nursing staff and Recommend an assessment from physical therapy and/or occupational therapy  ANTICIPATED DISCHARGE: Greater than 48 hours.  ANTICIPATED DISPOSITION: Home with Home Healthcare  EMERGENCY CONTACT/SURROGATE DECISION MAKER:   Shailesh Fuentes (Child)  [phone] (Mobile)     CRITICAL CARE WAS PERFORMED FOR THIS ENCOUNTER: YES. I had a face to face encounter with the patient, reviewed and interpreted

## 2024-08-10 NOTE — H&P (VIEW-ONLY)
.  GI CONSULTATION NOTE      NAME:  Jose Fuentes  :   1940   MRN:   [id number]       Referring Physician: Dr. Caitlin Montelongo    Consult Date: 8/10/2024     Reason for GI Consult: Rectal bleeding    Impression:   In summary patient is 84-year-old male with history of coronary artery disease, remote history of MI, status post stent angioplasty, history of prostate cancer that was s/p radiation treatment to the pelvic region, presenting with 10 days of symptoms of intermittent rectal bleeding, gross hematuria, and new onset hydronephrosis requiring nephrostomy tube.  Differential diagnosis includes possible radiation induced angiodysplasia with bleeding, versus colorectal neoplasm.  Given current clinical presentation, patient will benefit from colonoscopic investigation.      Recommendation:   1.  IV hydration  2.  Serial hemoglobin hematocrit monitoring  3.  Initiate GoLytely prep  4.  Consult for evaluation in a.m.    History of Present Illness:  Patient is a 84 y.o. who is seen in consultation at the request of Dr. Caitlin Montelongo for further evaluation of persistent rectal bleeding x 10 days duration.  Patient reports onset of bright red blood per rectum occurring almost on a daily basis over the past 10 days.  An associated change in bowel habits.  There is no symptoms of abdominal pain nausea and vomiting.  Melena is not reported.  Patient history significant for documentation of prostate carcinoma 30 years prior, and in which he received radiation treatment to the prostate region.  His last colonoscopy evaluation was approximately 10 years prior.  He is remaining GI review of systems unremarkable.      PMH:  Past Medical History:   Diagnosis Date    Bladder cancer (HCC)     CAD (coronary artery disease)     MI     Cancer (HCC)     prostate s/p xrt    Macular degeneration     MI (myocardial infarction) (HCC) 2004       PSH:  Past Surgical History:   Procedure Laterality Date    APPENDECTOMY

## 2024-08-10 NOTE — ED PROVIDER NOTES
[facility] EMERGENCY DEP  EMERGENCY DEPARTMENT ENCOUNTER      Pt Name: Jose Fuentes  MRN: [id number]  Birthdate 1940  Date of evaluation: 8/9/2024  Provider: Deandre Reynolds MD    CHIEF COMPLAINT       Chief Complaint   Patient presents with    Rectal Bleeding    Hematuria    Gait Problem         HISTORY OF PRESENT ILLNESS   (Location/Symptom, Timing/Onset, Context/Setting, Quality, Duration, Modifying Factors, Severity)  Note limiting factors.   84-year-old male with rectal bleeding and hematuria for a day.  Has a history of prostate cancer 30 years ago with radioactive seed implants.  Currently takes Plavix.  No chest pain no shortness of breath.  No nausea or vomiting    The history is provided by the patient.   Rectal Bleeding  Quality:  Bright red  Amount:  Moderate  Timing:  Constant  Chronicity:  Chronic  Context: not anal fissures, not anal penetration, not constipation, not diarrhea and not foreign body    Similar prior episodes: no    Relieved by:  Nothing  Worsened by:  Nothing  Ineffective treatments:  None tried  Associated symptoms: no abdominal pain, no dizziness, no epistaxis, no fever, no hematemesis, no light-headedness, no recent illness and no vomiting          Review of External Medical Records:     Nursing Notes were reviewed.    REVIEW OF SYSTEMS    (2-9 systems for level 4, 10 or more for level 5)     Review of Systems   Constitutional:  Negative for fatigue and fever.   HENT:  Negative for ear pain, nosebleeds, rhinorrhea and sore throat.    Eyes:  Negative for pain and visual disturbance.   Respiratory:  Negative for cough and shortness of breath.    Cardiovascular:  Negative for chest pain.   Gastrointestinal:  Positive for hematochezia. Negative for abdominal pain, diarrhea, hematemesis, nausea and vomiting.   Genitourinary:  Negative for dysuria.   Musculoskeletal:  Negative for arthralgias, back pain and joint swelling.   Skin:  Negative for color change and rash.   Neurological:

## 2024-08-10 NOTE — CONSULTS
.  GI CONSULTATION NOTE      NAME:  Jose Fuentes  :   1940   MRN:   [id number]       Referring Physician: Dr. Caitlin Montelongo    Consult Date: 8/10/2024     Reason for GI Consult: Rectal bleeding    Impression:   In summary patient is 84-year-old male with history of coronary artery disease, remote history of MI, status post stent angioplasty, history of prostate cancer that was s/p radiation treatment to the pelvic region, presenting with 10 days of symptoms of intermittent rectal bleeding, gross hematuria, and new onset hydronephrosis requiring nephrostomy tube.  Differential diagnosis includes possible radiation induced angiodysplasia with bleeding, versus colorectal neoplasm.  Given current clinical presentation, patient will benefit from colonoscopic investigation.      Recommendation:   1.  IV hydration  2.  Serial hemoglobin hematocrit monitoring  3.  Initiate GoLytely prep  4.  Consult for evaluation in a.m.    History of Present Illness:  Patient is a 84 y.o. who is seen in consultation at the request of Dr. Caitlin Montelongo for further evaluation of persistent rectal bleeding x 10 days duration.  Patient reports onset of bright red blood per rectum occurring almost on a daily basis over the past 10 days.  An associated change in bowel habits.  There is no symptoms of abdominal pain nausea and vomiting.  Melena is not reported.  Patient history significant for documentation of prostate carcinoma 30 years prior, and in which he received radiation treatment to the prostate region.  His last colonoscopy evaluation was approximately 10 years prior.  He is remaining GI review of systems unremarkable.      PMH:  Past Medical History:   Diagnosis Date    Bladder cancer (HCC)     CAD (coronary artery disease)     MI     Cancer (HCC)     prostate s/p xrt    Macular degeneration     MI (myocardial infarction) (HCC) 2004       PSH:  Past Surgical History:   Procedure Laterality Date    APPENDECTOMY

## 2024-08-10 NOTE — CONSULTS
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 Rangel  Was supposed to start nubeqa at last visit  He presented to er and was found to have renal failure and left hydro  Also has a hx of partial neph and has atrophic right kidney    Ros  No fever chills nasuea vomiting    Past Medical History:   Diagnosis Date    Bladder cancer (HCC)     CAD (coronary artery disease)     MI     Cancer (HCC)     prostate s/p xrt    Macular degeneration     MI (myocardial infarction) (HCC) 2004     Past Surgical History:   Procedure Laterality Date    APPENDECTOMY      CATARACT REMOVAL      bilateral    IL UNLISTED PROCEDURE CARDIAC SURGERY      2 Stents    UROLOGICAL SURGERY  6/24/15    RIGHT ROBOTIC PARTIAL NEPHRECTOMY  (LATEX ALLERGY), renal exploration, cystectomy x 3     Family History   Problem Relation Age of Onset    No Known Problems Sister     No Known Problems Sister     No Known Problems Sister     Alzheimer's Disease Mother     Cancer Father         colon     Social History     Socioeconomic History    Marital status:    Tobacco Use    Smoking status: Former     Current packs/day: 0.00     Average packs/day: 0.5 packs/day for 58.8 years (29.4 ttl pk-yrs)     Types: Cigarettes     Start date: 1964     Quit date: 2022     Years since quittin.7     Passive exposure: Past    Smokeless tobacco: Never   Vaping Use    Vaping status: Never Used   Substance and Sexual Activity    Alcohol use: No     Alcohol/week: 0.0 standard drinks of alcohol    Drug use: Never    Sexual activity: Not Currently     Partners: Female     Social Determinants of Health     Financial Resource Strain: Low Risk  (2024)    Overall Financial Resource Strain (CARDIA)     Difficulty of Paying Living Expenses: Not hard at all   Food Insecurity: No Food Insecurity (2024)    Hunger Vital Sign     Worried About

## 2024-08-10 NOTE — ED TRIAGE NOTES
Pt ambualtory to triage co unsteady gait x 1 week as well as vomiting, PERLA, fatigue, bloody stool and urine x 1 day. Per patient family, patient had a fall prior to the unsteady gait and bloody stool and urine. Pt denies hitting head, LOC during fall. Denies dizziness    Pt stool and urine is bright red.    Pt is on a blood thinner.     PMH: MI, Prostate CA

## 2024-08-10 NOTE — PROCEDURES
PROCEDURE NOTE  Date: 8/10/2024   Name: Jose Fuentes  YOB: 1940    Procedures        Interventional Radiology  Procedure Note        8/10/2024 10:51 AM    Patient: Jose Fuentes     Informed consent obtained    Diagnosis: Left hydro    Procedure(s): Left percutaneous nephrostomy placement     Findings: Blood tinged urine, severe hydro, 8.5 Fr neph tube placed.     EBL: Minimal    Specimens removed: None    Complications: None    Primary Physician: Lonnie Whitaker MD    Recomendations: Continue bag drainage     Full dictated report to follow    Signed By: Lonnie Whitaker MD

## 2024-08-10 NOTE — ED NOTES
TRANSFER - OUT REPORT:    Verbal report given to GÓMEZ Drake on Jose Fuentes  being transferred to [facility], [place] for routine progression of patient care       Report consisted of patient's Situation, Background, Assessment and   Recommendations(SBAR).     Information from the following report(s) ED SBAR, MAR, and Recent Results was reviewed with the receiving nurse.    [place] Fall Assessment:    Presents to emergency department  because of falls (Syncope, seizure, or loss of consciousness): No  Age > 70: Yes  Altered Mental Status, Intoxication with alcohol or substance confusion (Disorientation, impaired judgment, poor safety awaremess, or inability to follow instructions): No  Impaired Mobility: Ambulates or transfers with assistive devices or assistance; Unable to ambulate or transer.: Yes  Nursing Judgement: Yes          Lines:   Peripheral IV 08/10/24 Left Antecubital (Active)   Site Assessment Clean, dry & intact 08/10/24 0029        Opportunity for questions and clarification was provided.      Patient transported with:  Monitor and Registered Nurse

## 2024-08-11 ENCOUNTER — ANESTHESIA (OUTPATIENT)
Facility: HOSPITAL | Age: 84
End: 2024-08-11
Payer: MEDICARE

## 2024-08-11 ENCOUNTER — ANESTHESIA EVENT (OUTPATIENT)
Facility: HOSPITAL | Age: 84
End: 2024-08-11
Payer: MEDICARE

## 2024-08-11 LAB
ALBUMIN SERPL-MCNC: 2.8 G/DL (ref 3.5–5)
ALBUMIN/GLOB SERPL: 0.7 (ref 1.1–2.2)
ALP SERPL-CCNC: 66 U/L (ref 45–117)
ALT SERPL-CCNC: 6 U/L (ref 12–78)
ANION GAP SERPL CALC-SCNC: 7 MMOL/L (ref 5–15)
AST SERPL-CCNC: 11 U/L (ref 15–37)
BASOPHILS # BLD: 0 K/UL (ref 0–0.1)
BASOPHILS NFR BLD: 0 % (ref 0–1)
BILIRUB SERPL-MCNC: 0.4 MG/DL (ref 0.2–1)
BUN SERPL-MCNC: 70 MG/DL (ref 6–20)
BUN/CREAT SERPL: 9 (ref 12–20)
CALCIUM SERPL-MCNC: 9.6 MG/DL (ref 8.5–10.1)
CHLORIDE SERPL-SCNC: 114 MMOL/L (ref 97–108)
CO2 SERPL-SCNC: 20 MMOL/L (ref 21–32)
CREAT SERPL-MCNC: 8.01 MG/DL (ref 0.7–1.3)
DIFFERENTIAL METHOD BLD: ABNORMAL
EKG ATRIAL RATE: 100 BPM
EKG DIAGNOSIS: NORMAL
EKG P AXIS: 34 DEGREES
EKG P-R INTERVAL: 168 MS
EKG Q-T INTERVAL: 374 MS
EKG QRS DURATION: 110 MS
EKG QTC CALCULATION (BAZETT): 482 MS
EKG R AXIS: 8 DEGREES
EKG T AXIS: -7 DEGREES
EKG VENTRICULAR RATE: 100 BPM
EOSINOPHIL # BLD: 0.1 K/UL (ref 0–0.4)
EOSINOPHIL NFR BLD: 1 % (ref 0–7)
ERYTHROCYTE [DISTWIDTH] IN BLOOD BY AUTOMATED COUNT: 13.5 % (ref 11.5–14.5)
GLOBULIN SER CALC-MCNC: 3.8 G/DL (ref 2–4)
GLUCOSE SERPL-MCNC: 120 MG/DL (ref 65–100)
HCT VFR BLD AUTO: 33.2 % (ref 36.6–50.3)
HGB BLD-MCNC: 10.5 G/DL (ref 12.1–17)
IMM GRANULOCYTES # BLD AUTO: 0.1 K/UL (ref 0–0.04)
IMM GRANULOCYTES NFR BLD AUTO: 1 % (ref 0–0.5)
LYMPHOCYTES # BLD: 1.4 K/UL (ref 0.8–3.5)
LYMPHOCYTES NFR BLD: 11 % (ref 12–49)
MCH RBC QN AUTO: 30.6 PG (ref 26–34)
MCHC RBC AUTO-ENTMCNC: 31.6 G/DL (ref 30–36.5)
MCV RBC AUTO: 96.8 FL (ref 80–99)
MONOCYTES # BLD: 0.6 K/UL (ref 0–1)
MONOCYTES NFR BLD: 5 % (ref 5–13)
NEUTS SEG # BLD: 10.2 K/UL (ref 1.8–8)
NEUTS SEG NFR BLD: 82 % (ref 32–75)
NRBC # BLD: 0 K/UL (ref 0–0.01)
NRBC BLD-RTO: 0 PER 100 WBC
PLATELET # BLD AUTO: 212 K/UL (ref 150–400)
POTASSIUM SERPL-SCNC: 4.6 MMOL/L (ref 3.5–5.1)
PROT SERPL-MCNC: 6.6 G/DL (ref 6.4–8.2)
PSA SERPL-MCNC: 73.7 NG/ML (ref 0.01–4)
RBC # BLD AUTO: 3.43 M/UL (ref 4.1–5.7)
RBC MORPH BLD: ABNORMAL
SODIUM SERPL-SCNC: 141 MMOL/L (ref 136–145)
WBC # BLD AUTO: 12.4 K/UL (ref 4.1–11.1)

## 2024-08-11 PROCEDURE — 2500000003 HC RX 250 WO HCPCS: Performed by: NURSE ANESTHETIST, CERTIFIED REGISTERED

## 2024-08-11 PROCEDURE — 7100000000 HC PACU RECOVERY - FIRST 15 MIN: Performed by: INTERNAL MEDICINE

## 2024-08-11 PROCEDURE — 3600000012 HC SURGERY LEVEL 2 ADDTL 15MIN: Performed by: INTERNAL MEDICINE

## 2024-08-11 PROCEDURE — 94760 N-INVAS EAR/PLS OXIMETRY 1: CPT

## 2024-08-11 PROCEDURE — 2060000000 HC ICU INTERMEDIATE R&B

## 2024-08-11 PROCEDURE — 0DBN8ZX EXCISION OF SIGMOID COLON, VIA NATURAL OR ARTIFICIAL OPENING ENDOSCOPIC, DIAGNOSTIC: ICD-10-PCS | Performed by: INTERNAL MEDICINE

## 2024-08-11 PROCEDURE — 0DBL8ZX EXCISION OF TRANSVERSE COLON, VIA NATURAL OR ARTIFICIAL OPENING ENDOSCOPIC, DIAGNOSTIC: ICD-10-PCS | Performed by: INTERNAL MEDICINE

## 2024-08-11 PROCEDURE — 85025 COMPLETE CBC W/AUTO DIFF WBC: CPT

## 2024-08-11 PROCEDURE — 2580000003 HC RX 258: Performed by: NURSE ANESTHETIST, CERTIFIED REGISTERED

## 2024-08-11 PROCEDURE — 7100000001 HC PACU RECOVERY - ADDTL 15 MIN: Performed by: INTERNAL MEDICINE

## 2024-08-11 PROCEDURE — 80053 COMPREHEN METABOLIC PANEL: CPT

## 2024-08-11 PROCEDURE — 6360000002 HC RX W HCPCS: Performed by: NURSE ANESTHETIST, CERTIFIED REGISTERED

## 2024-08-11 PROCEDURE — 0W3P8ZZ CONTROL BLEEDING IN GASTROINTESTINAL TRACT, VIA NATURAL OR ARTIFICIAL OPENING ENDOSCOPIC: ICD-10-PCS | Performed by: INTERNAL MEDICINE

## 2024-08-11 PROCEDURE — 6370000000 HC RX 637 (ALT 250 FOR IP): Performed by: INTERNAL MEDICINE

## 2024-08-11 PROCEDURE — 88305 TISSUE EXAM BY PATHOLOGIST: CPT

## 2024-08-11 PROCEDURE — 0DBK8ZX EXCISION OF ASCENDING COLON, VIA NATURAL OR ARTIFICIAL OPENING ENDOSCOPIC, DIAGNOSTIC: ICD-10-PCS | Performed by: INTERNAL MEDICINE

## 2024-08-11 PROCEDURE — 36415 COLL VENOUS BLD VENIPUNCTURE: CPT

## 2024-08-11 PROCEDURE — 2580000003 HC RX 258: Performed by: STUDENT IN AN ORGANIZED HEALTH CARE EDUCATION/TRAINING PROGRAM

## 2024-08-11 PROCEDURE — 2580000003 HC RX 258: Performed by: FAMILY MEDICINE

## 2024-08-11 PROCEDURE — C1889 IMPLANT/INSERT DEVICE, NOC: HCPCS | Performed by: INTERNAL MEDICINE

## 2024-08-11 PROCEDURE — 6370000000 HC RX 637 (ALT 250 FOR IP): Performed by: FAMILY MEDICINE

## 2024-08-11 PROCEDURE — 2709999900 HC NON-CHARGEABLE SUPPLY: Performed by: INTERNAL MEDICINE

## 2024-08-11 PROCEDURE — 3700000000 HC ANESTHESIA ATTENDED CARE: Performed by: INTERNAL MEDICINE

## 2024-08-11 PROCEDURE — 3700000001 HC ADD 15 MINUTES (ANESTHESIA): Performed by: INTERNAL MEDICINE

## 2024-08-11 PROCEDURE — 3600000002 HC SURGERY LEVEL 2 BASE: Performed by: INTERNAL MEDICINE

## 2024-08-11 PROCEDURE — 84153 ASSAY OF PSA TOTAL: CPT

## 2024-08-11 PROCEDURE — 84403 ASSAY OF TOTAL TESTOSTERONE: CPT

## 2024-08-11 PROCEDURE — 6360000002 HC RX W HCPCS: Performed by: STUDENT IN AN ORGANIZED HEALTH CARE EDUCATION/TRAINING PROGRAM

## 2024-08-11 DEVICE — WORKING LENGTH 235CM, WORKING CHANNEL 2.8MM
Type: IMPLANTABLE DEVICE | Site: RECTUM | Status: FUNCTIONAL
Brand: RESOLUTION 360 CLIP

## 2024-08-11 DEVICE — CLIP
Type: IMPLANTABLE DEVICE | Site: RECTUM | Status: FUNCTIONAL
Brand: RESOLUTION 360™ ULTRA CLIP

## 2024-08-11 RX ORDER — SODIUM CHLORIDE, SODIUM LACTATE, POTASSIUM CHLORIDE, CALCIUM CHLORIDE 600; 310; 30; 20 MG/100ML; MG/100ML; MG/100ML; MG/100ML
INJECTION, SOLUTION INTRAVENOUS CONTINUOUS PRN
Status: DISCONTINUED | OUTPATIENT
Start: 2024-08-11 | End: 2024-08-11 | Stop reason: SDUPTHER

## 2024-08-11 RX ORDER — LIDOCAINE HYDROCHLORIDE 20 MG/ML
INJECTION, SOLUTION EPIDURAL; INFILTRATION; INTRACAUDAL; PERINEURAL PRN
Status: DISCONTINUED | OUTPATIENT
Start: 2024-08-11 | End: 2024-08-11 | Stop reason: SDUPTHER

## 2024-08-11 RX ORDER — PROPOFOL 10 MG/ML
INJECTION, EMULSION INTRAVENOUS PRN
Status: DISCONTINUED | OUTPATIENT
Start: 2024-08-11 | End: 2024-08-11 | Stop reason: SDUPTHER

## 2024-08-11 RX ADMIN — PROPOFOL 25 MG: 10 INJECTION, EMULSION INTRAVENOUS at 14:05

## 2024-08-11 RX ADMIN — ATORVASTATIN CALCIUM 40 MG: 40 TABLET, FILM COATED ORAL at 21:15

## 2024-08-11 RX ADMIN — SODIUM CHLORIDE, POTASSIUM CHLORIDE, SODIUM LACTATE AND CALCIUM CHLORIDE: 600; 310; 30; 20 INJECTION, SOLUTION INTRAVENOUS at 13:40

## 2024-08-11 RX ADMIN — PROPOFOL 25 MG: 10 INJECTION, EMULSION INTRAVENOUS at 14:02

## 2024-08-11 RX ADMIN — PROPOFOL 25 MG: 10 INJECTION, EMULSION INTRAVENOUS at 14:12

## 2024-08-11 RX ADMIN — LIDOCAINE HYDROCHLORIDE 40 MG: 20 INJECTION, SOLUTION EPIDURAL; INFILTRATION; INTRACAUDAL; PERINEURAL at 14:00

## 2024-08-11 RX ADMIN — PROPOFOL 50 MG: 10 INJECTION, EMULSION INTRAVENOUS at 14:21

## 2024-08-11 RX ADMIN — PROPOFOL 50 MG: 10 INJECTION, EMULSION INTRAVENOUS at 14:48

## 2024-08-11 RX ADMIN — PROPOFOL 50 MG: 10 INJECTION, EMULSION INTRAVENOUS at 15:13

## 2024-08-11 RX ADMIN — BISACODYL 5 MG: 5 TABLET, COATED ORAL at 08:56

## 2024-08-11 RX ADMIN — PROPOFOL 25 MG: 10 INJECTION, EMULSION INTRAVENOUS at 14:14

## 2024-08-11 RX ADMIN — WATER 1000 MG: 1 INJECTION INTRAMUSCULAR; INTRAVENOUS; SUBCUTANEOUS at 18:07

## 2024-08-11 RX ADMIN — SODIUM CHLORIDE, PRESERVATIVE FREE 10 ML: 5 INJECTION INTRAVENOUS at 21:15

## 2024-08-11 RX ADMIN — PROPOFOL 50 MG: 10 INJECTION, EMULSION INTRAVENOUS at 14:18

## 2024-08-11 RX ADMIN — PROPOFOL 50 MG: 10 INJECTION, EMULSION INTRAVENOUS at 14:29

## 2024-08-11 RX ADMIN — PROPOFOL 25 MG: 10 INJECTION, EMULSION INTRAVENOUS at 14:10

## 2024-08-11 RX ADMIN — SODIUM BICARBONATE 650 MG: 650 TABLET ORAL at 08:57

## 2024-08-11 RX ADMIN — POLYETHYLENE GLYCOL-3350 AND ELECTROLYTES 2000 ML: 236; 6.74; 5.86; 2.97; 22.74 POWDER, FOR SOLUTION ORAL at 08:58

## 2024-08-11 RX ADMIN — PROPOFOL 25 MG: 10 INJECTION, EMULSION INTRAVENOUS at 14:07

## 2024-08-11 RX ADMIN — PROPOFOL 50 MG: 10 INJECTION, EMULSION INTRAVENOUS at 14:41

## 2024-08-11 RX ADMIN — PROPOFOL 50 MG: 10 INJECTION, EMULSION INTRAVENOUS at 14:55

## 2024-08-11 RX ADMIN — GLUCAGON HYDROCHLORIDE 1 MG: KIT at 14:10

## 2024-08-11 RX ADMIN — PROPOFOL 50 MG: 10 INJECTION, EMULSION INTRAVENOUS at 14:34

## 2024-08-11 RX ADMIN — PROPOFOL 50 MG: 10 INJECTION, EMULSION INTRAVENOUS at 14:00

## 2024-08-11 RX ADMIN — SODIUM BICARBONATE 650 MG: 650 TABLET ORAL at 21:15

## 2024-08-11 RX ADMIN — PROPOFOL 50 MG: 10 INJECTION, EMULSION INTRAVENOUS at 14:25

## 2024-08-11 RX ADMIN — SODIUM BICARBONATE 650 MG: 650 TABLET ORAL at 18:08

## 2024-08-11 ASSESSMENT — PAIN SCALES - GENERAL
PAINLEVEL_OUTOF10: 0

## 2024-08-11 ASSESSMENT — PAIN - FUNCTIONAL ASSESSMENT: PAIN_FUNCTIONAL_ASSESSMENT: NONE - DENIES PAIN

## 2024-08-11 NOTE — PERIOP NOTE
TRANSFER - OUT REPORT:    Verbal report given to 418 RN(name) on Jose Fuentes  being transferred to 418(unit) for routine post-op       Report consisted of patient’s Situation, Background, Assessment and   Recommendations(SBAR).     Time Pre op antibiotic given:n/a  Anesthesia Stop time: 1545    Information from the following report(s) SBAR, Procedure Summary, Intake/Output, and MAR was reviewed with the receiving nurse.    Opportunity for questions and clarification was provided.     Is the patient on 02? No        Is the patient on a monitor? No    Is the nurse transporting with the patient? Yes    Surgical Waiting Area notified of patient's transfer from PACU? Yes

## 2024-08-11 NOTE — PROCEDURES
PROCEDURE NOTE  Date: 8/11/2024   Name: Jose Fuentes  YOB: 1940  .   Colonoscopy Procedure Note      Indications:   Rectal bleeding      :  Ferdinand Bueno MD    Surgical Assistant: Anaulator: Yan Kam RN  Endoscopy Technician: Cassius Linn    Implants: NA    Referring Provider: Tez Srinivasan DO    Sedation:  MAC anesthesia Propofol    Procedure Details:  After informed consent was obtained with all risks and benefits of procedure explained and preoperative exam completed, the patient was taken to the endoscopy suite and placed in the left lateral decubitus position.  Upon sequential sedation as per above, a digital rectal exam was performed  And was normal.  The Olympus videocolonoscope  was inserted in the rectum and carefully advanced to the terminal ileum.  The quality of preparation was good.  The colonoscope was slowly withdrawn with careful evaluation between folds. Retroflexion in the rectum was performed and was normal..     Findings:   Rectum: Minimal external hemorrhoids    Sigmoid:   Scattered diverticula  A large, sessile polyp, ~ 20 mm, at 22 _cm location; Polyp snared and removed with hot snare. Post polypectomy bleeding controlled via 3- hemoclips application    Descending Colon:   Scattered small and large diverticula noted    Transverse Colon: Few diverticula noted    Ascending Colon: At level of the hepatic flexure, 3 polyps were noted; A pedunculated 15 mm polyp, snared with not snare; A broad based sessile polyp, laterally spreading granular polyp, 20 mm, removed by saline lift method in a piecemeal fashion;Mari ink tattoo is placed across from the polyp on the opposite wall of the colon for future site reference.  (The 2 polyps were combined as one specimen). Rothnet was used to retrieve polyp fragments  The third polyp is a 10 mm sessile polyp, removed with a cold snare , but required 1 hemoclip application for bleeding control    There were multiple scattered

## 2024-08-11 NOTE — INTERVAL H&P NOTE
Update History & Physical    The patient's History and Physical of August 10,  was reviewed with the patient and I examined the patient. There was no change. The surgical site was confirmed by the patient and me.     Plan: The risks, benefits, expected outcome, and alternative to the recommended procedure have been discussed with the patient. Patient understands and wants to proceed with the procedure.     Electronically signed by Ferdinand Bueno MD on 8/11/2024 at 1:13 PM

## 2024-08-11 NOTE — FLOWSHEET NOTE
08/11/24 1517   Endoscopy Time   Scope In Time 1403   Cecum Reached? Yes   Cecum Reached Time 1412   Scope Removed Time 1518   Scope Withdrawal Time (Min) 66

## 2024-08-12 LAB
ANION GAP SERPL CALC-SCNC: 6 MMOL/L (ref 5–15)
BASOPHILS # BLD: 0.1 K/UL (ref 0–0.1)
BASOPHILS NFR BLD: 1 % (ref 0–1)
BUN SERPL-MCNC: 52 MG/DL (ref 6–20)
BUN/CREAT SERPL: 11 (ref 12–20)
CALCIUM SERPL-MCNC: 9.4 MG/DL (ref 8.5–10.1)
CHLORIDE SERPL-SCNC: 114 MMOL/L (ref 97–108)
CO2 SERPL-SCNC: 25 MMOL/L (ref 21–32)
CREAT SERPL-MCNC: 4.6 MG/DL (ref 0.7–1.3)
DIFFERENTIAL METHOD BLD: ABNORMAL
EOSINOPHIL # BLD: 0.3 K/UL (ref 0–0.4)
EOSINOPHIL NFR BLD: 3 % (ref 0–7)
ERYTHROCYTE [DISTWIDTH] IN BLOOD BY AUTOMATED COUNT: 13.5 % (ref 11.5–14.5)
GLUCOSE SERPL-MCNC: 92 MG/DL (ref 65–100)
HCT VFR BLD AUTO: 31.7 % (ref 36.6–50.3)
HGB BLD-MCNC: 10.1 G/DL (ref 12.1–17)
IMM GRANULOCYTES # BLD AUTO: 0.1 K/UL (ref 0–0.04)
IMM GRANULOCYTES NFR BLD AUTO: 1 % (ref 0–0.5)
LYMPHOCYTES # BLD: 2.2 K/UL (ref 0.8–3.5)
LYMPHOCYTES NFR BLD: 21 % (ref 12–49)
MCH RBC QN AUTO: 30.8 PG (ref 26–34)
MCHC RBC AUTO-ENTMCNC: 31.9 G/DL (ref 30–36.5)
MCV RBC AUTO: 96.6 FL (ref 80–99)
MONOCYTES # BLD: 0.8 K/UL (ref 0–1)
MONOCYTES NFR BLD: 7 % (ref 5–13)
NEUTS SEG # BLD: 7.4 K/UL (ref 1.8–8)
NEUTS SEG NFR BLD: 67 % (ref 32–75)
NRBC # BLD: 0 K/UL (ref 0–0.01)
NRBC BLD-RTO: 0 PER 100 WBC
PLATELET # BLD AUTO: 218 K/UL (ref 150–400)
PMV BLD AUTO: 9.7 FL (ref 8.9–12.9)
POTASSIUM SERPL-SCNC: 4.1 MMOL/L (ref 3.5–5.1)
RBC # BLD AUTO: 3.28 M/UL (ref 4.1–5.7)
SODIUM SERPL-SCNC: 145 MMOL/L (ref 136–145)
TESTOST SERPL-MCNC: <3 NG/DL (ref 264–916)
WBC # BLD AUTO: 10.7 K/UL (ref 4.1–11.1)

## 2024-08-12 PROCEDURE — 85025 COMPLETE CBC W/AUTO DIFF WBC: CPT

## 2024-08-12 PROCEDURE — 6370000000 HC RX 637 (ALT 250 FOR IP): Performed by: FAMILY MEDICINE

## 2024-08-12 PROCEDURE — 80048 BASIC METABOLIC PNL TOTAL CA: CPT

## 2024-08-12 PROCEDURE — 6370000000 HC RX 637 (ALT 250 FOR IP): Performed by: INTERNAL MEDICINE

## 2024-08-12 PROCEDURE — 2580000003 HC RX 258: Performed by: STUDENT IN AN ORGANIZED HEALTH CARE EDUCATION/TRAINING PROGRAM

## 2024-08-12 PROCEDURE — 2580000003 HC RX 258: Performed by: FAMILY MEDICINE

## 2024-08-12 PROCEDURE — 1100000000 HC RM PRIVATE

## 2024-08-12 PROCEDURE — 6360000002 HC RX W HCPCS: Performed by: STUDENT IN AN ORGANIZED HEALTH CARE EDUCATION/TRAINING PROGRAM

## 2024-08-12 PROCEDURE — 36415 COLL VENOUS BLD VENIPUNCTURE: CPT

## 2024-08-12 PROCEDURE — 6370000000 HC RX 637 (ALT 250 FOR IP): Performed by: STUDENT IN AN ORGANIZED HEALTH CARE EDUCATION/TRAINING PROGRAM

## 2024-08-12 RX ORDER — CLOPIDOGREL BISULFATE 75 MG/1
75 TABLET ORAL DAILY
Status: DISCONTINUED | OUTPATIENT
Start: 2024-08-12 | End: 2024-08-13 | Stop reason: HOSPADM

## 2024-08-12 RX ORDER — ASPIRIN 81 MG/1
81 TABLET ORAL DAILY
Status: DISCONTINUED | OUTPATIENT
Start: 2024-08-12 | End: 2024-08-13 | Stop reason: HOSPADM

## 2024-08-12 RX ADMIN — BISACODYL 5 MG: 5 TABLET, COATED ORAL at 09:56

## 2024-08-12 RX ADMIN — CLOPIDOGREL BISULFATE 75 MG: 75 TABLET ORAL at 19:07

## 2024-08-12 RX ADMIN — ASPIRIN 81 MG: 81 TABLET, COATED ORAL at 19:07

## 2024-08-12 RX ADMIN — SODIUM CHLORIDE, PRESERVATIVE FREE 10 ML: 5 INJECTION INTRAVENOUS at 21:23

## 2024-08-12 RX ADMIN — SODIUM BICARBONATE 650 MG: 650 TABLET ORAL at 13:56

## 2024-08-12 RX ADMIN — ATORVASTATIN CALCIUM 40 MG: 40 TABLET, FILM COATED ORAL at 21:23

## 2024-08-12 RX ADMIN — SODIUM CHLORIDE, PRESERVATIVE FREE 10 ML: 5 INJECTION INTRAVENOUS at 09:57

## 2024-08-12 RX ADMIN — SODIUM BICARBONATE 650 MG: 650 TABLET ORAL at 09:56

## 2024-08-12 RX ADMIN — WATER 1000 MG: 1 INJECTION INTRAMUSCULAR; INTRAVENOUS; SUBCUTANEOUS at 13:57

## 2024-08-12 ASSESSMENT — PAIN SCALES - GENERAL
PAINLEVEL_OUTOF10: 0
PAINLEVEL_OUTOF10: 0

## 2024-08-12 NOTE — CARE COORDINATION
Care Management Initial Assessment       RUR: 17%  Readmission? No  1st IM letter given? Yes - 8/12/24  1st  letter given: No    CM at the bedside with pt, pt daughter, and pt son to verify demographics and insurance info. Pt lives with his spouse in a two story home. He has no issues ambulating stairs and his bedroom is upstairs. He denies use of DME. Hx of HH - [facility]. Daughter requested  and would like to send to [facility]; CM relayed that [facility] does not take [employer] Medicare. Daughter requested [facility] and [facility]. CM sent orders via [employer] to them. Pending approval.    No hx of SNF/IPR. Pt still drives and is very active. Independent with ADLs. PCP verified. Pharm - [facility] [place]    CM will continue to follow.     Flaca Jones RN CM    [employer] [employer]    08/12/24 3453   Service Assessment   Patient Orientation Alert and Oriented   Cognition Alert   History Provided By Patient;Child/Family   Primary Caregiver Self   Support Systems Spouse/Significant Other;Children;Family Members   PCP Verified by CM Yes   Last Visit to PCP Within last 3 months   Prior Functional Level Independent in ADLs/IADLs   Current Functional Level Independent in ADLs/IADLs   Can patient return to prior living arrangement Yes   Ability to make needs known: Good   Family able to assist with home care needs: Yes   Would you like for me to discuss the discharge plan with any other family members/significant others, and if so, who? Yes   Financial Resources Medicare   Community Resources None   CM/SW Referral ADLs/IADLs   Social/Functional History   Lives With Spouse   Type of Home House   Home Layout Two level   Home Access Stairs to enter with rails   Bathroom Accessibility Accessible   Home Equipment None   Receives Help From Family;Friend(s)   ADL Assistance Independent   Homemaking Assistance Independent   Homemaking Responsibilities Yes   Ambulation Assistance Independent   Transfer

## 2024-08-13 VITALS
TEMPERATURE: 98.2 F | HEART RATE: 78 BPM | DIASTOLIC BLOOD PRESSURE: 73 MMHG | OXYGEN SATURATION: 93 % | BODY MASS INDEX: 24.69 KG/M2 | RESPIRATION RATE: 18 BRPM | HEIGHT: 69 IN | WEIGHT: 166.67 LBS | SYSTOLIC BLOOD PRESSURE: 131 MMHG

## 2024-08-13 LAB
ANION GAP SERPL CALC-SCNC: 8 MMOL/L (ref 5–15)
BASOPHILS # BLD: 0 K/UL (ref 0–0.1)
BASOPHILS NFR BLD: 1 % (ref 0–1)
BUN SERPL-MCNC: 30 MG/DL (ref 6–20)
BUN/CREAT SERPL: 12 (ref 12–20)
CALCIUM SERPL-MCNC: 9.3 MG/DL (ref 8.5–10.1)
CHLORIDE SERPL-SCNC: 111 MMOL/L (ref 97–108)
CO2 SERPL-SCNC: 24 MMOL/L (ref 21–32)
CREAT SERPL-MCNC: 2.48 MG/DL (ref 0.7–1.3)
DIFFERENTIAL METHOD BLD: ABNORMAL
EOSINOPHIL # BLD: 0.4 K/UL (ref 0–0.4)
EOSINOPHIL NFR BLD: 4 % (ref 0–7)
ERYTHROCYTE [DISTWIDTH] IN BLOOD BY AUTOMATED COUNT: 13.2 % (ref 11.5–14.5)
GLUCOSE SERPL-MCNC: 97 MG/DL (ref 65–100)
HCT VFR BLD AUTO: 30.3 % (ref 36.6–50.3)
HGB BLD-MCNC: 10 G/DL (ref 12.1–17)
IMM GRANULOCYTES # BLD AUTO: 0.1 K/UL (ref 0–0.04)
IMM GRANULOCYTES NFR BLD AUTO: 1 % (ref 0–0.5)
LYMPHOCYTES # BLD: 1.4 K/UL (ref 0.8–3.5)
LYMPHOCYTES NFR BLD: 16 % (ref 12–49)
MCH RBC QN AUTO: 31.2 PG (ref 26–34)
MCHC RBC AUTO-ENTMCNC: 33 G/DL (ref 30–36.5)
MCV RBC AUTO: 94.4 FL (ref 80–99)
MONOCYTES # BLD: 0.5 K/UL (ref 0–1)
MONOCYTES NFR BLD: 6 % (ref 5–13)
NEUTS SEG # BLD: 6.2 K/UL (ref 1.8–8)
NEUTS SEG NFR BLD: 72 % (ref 32–75)
NRBC # BLD: 0 K/UL (ref 0–0.01)
NRBC BLD-RTO: 0 PER 100 WBC
PLATELET # BLD AUTO: 229 K/UL (ref 150–400)
PMV BLD AUTO: 9.7 FL (ref 8.9–12.9)
POTASSIUM SERPL-SCNC: 3.4 MMOL/L (ref 3.5–5.1)
RBC # BLD AUTO: 3.21 M/UL (ref 4.1–5.7)
SODIUM SERPL-SCNC: 143 MMOL/L (ref 136–145)
WBC # BLD AUTO: 8.5 K/UL (ref 4.1–11.1)

## 2024-08-13 PROCEDURE — 2580000003 HC RX 258: Performed by: FAMILY MEDICINE

## 2024-08-13 PROCEDURE — 6370000000 HC RX 637 (ALT 250 FOR IP): Performed by: FAMILY MEDICINE

## 2024-08-13 PROCEDURE — 85025 COMPLETE CBC W/AUTO DIFF WBC: CPT

## 2024-08-13 PROCEDURE — 6370000000 HC RX 637 (ALT 250 FOR IP): Performed by: STUDENT IN AN ORGANIZED HEALTH CARE EDUCATION/TRAINING PROGRAM

## 2024-08-13 PROCEDURE — 80048 BASIC METABOLIC PNL TOTAL CA: CPT

## 2024-08-13 RX ORDER — CEPHALEXIN 500 MG/1
500 CAPSULE ORAL 3 TIMES DAILY
Qty: 21 CAPSULE | Refills: 0 | Status: SHIPPED | OUTPATIENT
Start: 2024-08-13 | End: 2024-08-20

## 2024-08-13 RX ORDER — SACCHAROMYCES BOULARDII 250 MG
250 CAPSULE ORAL 2 TIMES DAILY
Qty: 20 CAPSULE | Refills: 0 | Status: SHIPPED | OUTPATIENT
Start: 2024-08-13 | End: 2024-08-23

## 2024-08-13 RX ADMIN — ASPIRIN 81 MG: 81 TABLET, COATED ORAL at 08:54

## 2024-08-13 RX ADMIN — SODIUM CHLORIDE, PRESERVATIVE FREE 10 ML: 5 INJECTION INTRAVENOUS at 08:55

## 2024-08-13 RX ADMIN — CLOPIDOGREL BISULFATE 75 MG: 75 TABLET ORAL at 08:55

## 2024-08-13 RX ADMIN — BISACODYL 5 MG: 5 TABLET, COATED ORAL at 08:54

## 2024-08-13 NOTE — DISCHARGE SUMMARY
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  -Status post right robotic partial nephrectomy and cystectomy 6/25/2015  -BUN 99, creatinine 12.10 on admission   - Nephrology consulted, bicarb started; trend BMP   - Hopeful for renal recovery now that stent is placed   - Cr improving s/p PCN, down to 4.6 today    Rectal bleeding, resolved  GI bleed  -Bright red blood per rectum  -Hemoglobin stable   - Colonoscopy 8/11 with GI with multiple polyps removed   CHF ruled out   Edema of both lower extremities  -proBNP = 10,524; likely 2/2 renal failure   - ECHO 6/2024 with nromal EF 55-60% and normal diastolic function   -considered for guideline directed medical therapy but limited due to ARF  -Defer diuresis for now given ARF  Prostate cancer  -brachytherapy seeds in the prostate, large dense calculus measuring 1.5 cm but 2.4 cm x 2.8 cm probably in prostate urethra present since 2022  -Appreciate urology   Leukocytosis unspecified  Lymphocytosis  -WBC 15.1, neutrophils 88%  -Lymphocytes 6%, monocytes 4%  -Given instrumentation and obstruction, empiric rocephin x 5 days ordered   -improved  Normocytic normochromic anemia  -Hemoglobin 10.0, stable  -Repeat H&H  -Transfuse if hemoglobin less than 7.0   Hx of CAD s/p \"stents\"  -On dual antiplatelet therapy-with aspirin 81 mg p.o. daily and Plavix 75 mg p.o. 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

## 2024-08-13 NOTE — DISCHARGE INSTRUCTIONS
Discharge Instructions       PATIENT ID: Jose Fuentes  MRN: [id number]   YOB: 1940    DATE OF ADMISSION: 8/9/2024   DATE OF DISCHARGE: 8/13/2024    PRIMARY CARE PROVIDER: Tez Srinivasan     ATTENDING PHYSICIAN: Katt Perez MD   DISCHARGING PROVIDER: Katt Perez MD    To contact this individual call [phone] and ask the  to page.   If unavailable ask to be transferred the Adult Hospitalist Department.    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  Malignant neoplasm of urinary bladder, unspecified site HCC)  Hematuria  Acute renal failure superimposed on chronic kidney disease  Atrophic R kidney after partial nephrectomy 2015  Metabolic Acidosis   Rectal bleeding, resolved  GI bleed  CHF ruled out   Edema of both lower extremities  Prostate cancer  Leukocytosis unspecified  Lymphocytosis  Normocytic normochromic anemia  History of CAD    Unsteady gait  Generalized weakness      CONSULTATIONS:    IP CONSULT TO NEPHROLOGY  IP CONSULT TO GI  IP CONSULT TO UROLOGY  IP CONSULT TO CASE MANAGEMENT    PROCEDURES/SURGERIES: Procedure(s):  COLONOSCOPY DIAGNOSTIC    PENDING TEST RESULTS:   At the time of discharge the following test results are still pending:     FOLLOW UP APPOINTMENTS:   Tez Srinivasan DO in 2 weeks  Follow up as an outpatient with Dr. Rangel, Urologist    ADDITIONAL CARE RECOMMENDATIONS:      DIET: cardiac diet    ACTIVITY: activity as tolerated    WOUND CARE:  None    EQUIPMENT needed: None      DISCHARGE MEDICATIONS:   See Medication Reconciliation Form    It is important that you take the medication exactly as they are prescribed.   Keep your medication in the bottles provided by the pharmacist and keep a list of the medication names, dosages, and times to be taken in your wallet.   Do not take other medications without consulting your doctor.       NOTIFY YOUR PHYSICIAN FOR ANY OF THE FOLLOWING:   Fever over 101 degrees for 24

## 2024-08-13 NOTE — CARE COORDINATION
Transition of Care Plan:    RUR: 17%  Prior Level of Functioning:   Disposition: home with home health ([facility]  accepted)   Follow up appointments: pcp/specialist    DME needed: n/a  Transportation at discharge: wife  IM/IMM Medicare/ letter given: 8/12/24  Caregiver Contact: Shailesh Fuentes [phone]  Discharge Caregiver contacted prior to discharge? no  Care Conference needed? No   Barriers to discharge: nephrology consult.     Cm cancelled referral to [facility], notified that [facility] accepted and met with patient.    [facility]  Care Management Department  Ph:[phone]

## 2024-08-13 NOTE — PROGRESS NOTES
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  S/P PCN.    Creatinine is improving with excellent UOP     There is no acute need for HD at this time.        D/C NaHCO3    AM labs      On - Dr Mayer had a long talk with the pt, wife and family (who are also my patients).  I told them that I'm hopeful that renal function will improve.  If not he would require HD but given his other medical issues I'm not sure that he would do well with that.         Subjective:     Chief Complaint:  resting. Feels better        Objective:     VITALS:   Last 24hrs VS reviewed since prior progress note. Most recent are:  Vitals:    24 0931   BP: 127/66   Pulse: 80   Resp: 17   Temp:    SpO2:        Intake/Output Summary (Last 24 hours) at 2024 1405  Last data filed at 2024 0409  Gross per 24 hour   Intake 500 ml   Output 2475 ml   Net -1975 ml      Telemetry Reviewed:     PHYSICAL EXAM:  General: NAD  No edema  L PCN  AOx3      Lab Data Reviewed: (see below)    Medications Reviewed: (see below)    PMH/SH reviewed - no change compared to H&P  ________________________________________________________________________  Care Plan discussed with:  Patient     Family      RN     Care Manager                    Consultant:          Comments   >50% of visit spent in counseling and coordination of care       ________________________________________________________________________  Carlos Whitaker MD     Procedures: see electronic medical 
                                                                 POST FALL MANAGEMENT    Jose Fuentes  MEDICAL RECORD NUMBER:  [id number]  AGE: 84 y.o.   GENDER: male  : 1940  TODAYS DATE:  2024    Details     Fall Occurred: Yes  Yes  Was the Fall Witnessed:  No      Brief Review of Event: Patient called out, by the time staff arrived, he was sitting on the floor besides bedside commode. States that he slid to the floor and sat on his buttocks while attempting to use bedside commode. Denied pain, denied hitting his head or knees.. Vital signs stable, no dizziness noted.         Who found the patient: Nurse      Where was the patient at the time of the fall: In his room at bedside sitting on the floor       Patient Comments: \"I wanted to use the commode and I slid and sat on the floor and soiled myself\"       Date Fall Occurred:  2024 .       Time Fall Occurred: 3:00a.m.     Assessment     Post Fall Head to Toe Assessment Completed: Yes    Post Fall Predictive Analytic Score Reviewed: Yes     Post Fall Vitals Completed: Yes    Post Fall Neuro Checks Completed: Yes    Injury Occurred(if yes, describe injury):  no           Did the Patient Experience:(Check Vidal all that apply)    [] Patient hit head  [] Loss of consciousness  [] Change in mental status following the fall  [] Patient is on an anticoagulant medication      CT Performed:  no    Follow-up     Persons Notified of Fall:  (Provide names of persons notified)   [] Physician:   [x] KHLOE: Clotilde Brannon NP  [x] Nursing Supervisior: GÓMEZ Santiago  [] Manager:  [] Pharmacist:  [x] Family:  [] Other:      Electronically signed by Franky Jeronimo RN 2024 at 8:40 AM   
        [facility]                                                                                          Hospitalist Progress Note  Caitlin Montelongo MD  Office Phone: [phone]        Date of Service:  8/10/2024  NAME:  Jose Fuentes  :  1940  MRN:  [id number]       Admission Summary:   Jose Fuentes is a 84 y.o. male with past medical history of bladder cancer,, prostate cancer status post brachytherapy seeds implant, right robotic partial nephrectomy and cystectomy 2015, CAD, MI, macular degeneration presented to the emergency department chief complaints of bloody stool and urine, nausea, vomiting, and unsteady gait. He was found to have L kidney obstruction due to bladder mass.        Interval history / Subjective:   Patient admitted overnight for bladder obstruction causing acute renal failure and L hydronephrosis. IR placed stent this morning; draining well.     Patient evaluated on morning rounds. Feels well, no acute concerns. Patient's wife and KATALINA at bedside.      Assessment & Plan:        Hydronephrosis due to obstruction of ureter  Malignant neoplasm of urinary bladder, unspecified site HCC)  Hematuria  -wall thickening of the left and superior bladder walls measuring 2.5 cm x 5.2 cm x 2.4 cm which may represent carcinoma with urothelial carcinoma favored over recurrent prostate carcinoma  -multiple kim metastasis in the mesorectal fat, inferior mesenteric artery chain, left pelvic sidewall  -bladder tumor obstructing the uretero-vesicle junction, ill-defined left pelvic sidewall metastasis obstructing the distal left ureter with severe left hydronephrosis  -s/p nephrostomy stent placement by IR   - Appreciate Urology guidance regarding next steps for mass      Acute renal failure superimposed on chronic kidney disease  Atrophic R kidney after partial nephrectomy 2015  Metabolic Acidosis   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  -Status post 
        [facility]                                                                                          Hospitalist Progress Note  Caitlin Montelongo MD  Office Phone: [phone]        Date of Service:  2024  NAME:  Jose Fuentes  :  1940  MRN:  [id number]       Admission Summary:   Jose Fuentes is a 84 y.o. male with past medical history of bladder cancer,, prostate cancer status post brachytherapy seeds implant, right robotic partial nephrectomy and cystectomy 2015, CAD, MI, macular degeneration presented to the emergency department chief complaints of bloody stool and urine, nausea, vomiting, and unsteady gait. He was found to have L kidney obstruction due to bladder mass.        Interval history / Subjective:   Patient had colonoscopy yesterday with multiple polyps removed. Today he feels well, no acute concerns or complaints.      Assessment & Plan:        Hydronephrosis due to obstruction of ureter  Malignant neoplasm of urinary bladder, unspecified site HCC)  Hematuria  -wall thickening of the left and superior bladder walls measuring 2.5 cm x 5.2 cm x 2.4 cm which may represent carcinoma with urothelial carcinoma favored over recurrent prostate carcinoma  -multiple kim metastasis in the mesorectal fat, inferior mesenteric artery chain, left pelvic sidewall  -bladder tumor obstructing the uretero-vesicle junction, ill-defined left pelvic sidewall metastasis obstructing the distal left ureter with severe left hydronephrosis  -s/p nephrostomy stent placement by IR   - Appreciate Urology guidance regarding next steps for mass, likely will be addressed as an outpatient      Acute renal failure superimposed on chronic kidney disease  Atrophic R kidney after partial nephrectomy   Metabolic Acidosis   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  -Status post right robotic partial nephrectomy and cystectomy 2015  -BUN 99, creatinine 12.10 on 
        [facility]                                                                                          Hospitalist Progress Note  Caitlin Montelongo MD  Office Phone: [phone]        Date of Service:  2024  NAME:  Jose Fuentes  :  1940  MRN:  [id number]       Admission Summary:   Jose Fuentes is a 84 y.o. male with past medical history of bladder cancer,, prostate cancer status post brachytherapy seeds implant, right robotic partial nephrectomy and cystectomy 2015, CAD, MI, macular degeneration presented to the emergency department chief complaints of bloody stool and urine, nausea, vomiting, and unsteady gait. He was found to have L kidney obstruction due to bladder mass.        Interval history / Subjective:   Patient reports feeling well this morning but having difficulty with bowel prep. Discussed results with patient's family and plan for the day. All questions answered. No active blood in bowels.      Assessment & Plan:        Hydronephrosis due to obstruction of ureter  Malignant neoplasm of urinary bladder, unspecified site HCC)  Hematuria  -wall thickening of the left and superior bladder walls measuring 2.5 cm x 5.2 cm x 2.4 cm which may represent carcinoma with urothelial carcinoma favored over recurrent prostate carcinoma  -multiple kim metastasis in the mesorectal fat, inferior mesenteric artery chain, left pelvic sidewall  -bladder tumor obstructing the uretero-vesicle junction, ill-defined left pelvic sidewall metastasis obstructing the distal left ureter with severe left hydronephrosis  -s/p nephrostomy stent placement by IR   - Appreciate Urology guidance regarding next steps for mass      Acute renal failure superimposed on chronic kidney disease  Atrophic R kidney after partial nephrectomy   Metabolic Acidosis   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  -Status post right robotic partial nephrectomy and cystectomy 
    Patient: Jose Fuentes MRN: [id number]  SSN: [id number]    YOB: 1940  Age: 84 y.o.  Sex: male        ADMITTED: 2024 to Caitlin Montelongo MD by Justyn Cheek MD for Rectal bleeding [K62.5]  Hydronephrosis of left kidney [N13.30]  Malignant neoplasm of urinary bladder, unspecified site (HCC) [C67.9]  Acute renal failure, unspecified acute renal failure type (HCC) [N17.9]  Hydronephrosis due to obstruction of ureter [N13.1]  POD# 1 Day Post-Op Procedure(s):  COLONOSCOPY DIAGNOSTIC    Jose Fuentes is doing well. No specific complaints. Nephrostomy tube draining well. Good urine output. Serum creatinine continues to improve.     Vitals: Temp (24hrs), Av.3 °F (36.8 °C), Min:97.8 °F (36.6 °C), Max:99 °F (37.2 °C)    Blood pressure (!) 140/70, pulse 76, temperature 98.2 °F (36.8 °C), temperature source Oral, resp. rate 17, height 1.753 m (5' 9\"), weight 76.1 kg (167 lb 12.3 oz), SpO2 95%.    Intake and Output:  08/10 1901 -  0700  In: 500 [I.V.:500]  Out: 3950 [Urine:3950]  No intake/output data recorded.  TICO Output lats 24 hrs: No data found.   TICO Output last 8 hrs: No data found.  BM over last 24 hrs: No data found.    Physical Exam  General: NAD, pleasant  Respiratory: no distress, breathing easily, room air  Abdomen: soft, no distention; non-tender to palpation  : no CVA tenderness, Left nephrostomy tube with yellow urine   Neuro: Appropriate, no focal neurological deficits  Skin: warm, dry  Extremities: moves all, full ROM    Labs:  CBC:   Lab Results   Component Value Date/Time    WBC 10.7 2024 03:32 AM    HCT 31.7 2024 03:32 AM     2024 03:32 AM     BMP:   Lab Results   Component Value Date/Time     2024 03:32 AM    K 4.1 2024 03:32 AM     2024 03:32 AM    CO2 25 2024 03:32 AM    BUN 52 2024 03:32 AM     PSA 73.7 on 2024 from 13.11ng/ml on 2024      Assessment/Plan:   83 yo with met CRPC here for renal 
    Patient: Jose Fuentes MRN: [id number]  SSN: [id number]    YOB: 1940  Age: 84 y.o.  Sex: male        ADMITTED: 2024 to Katt Perez MD by Justyn Cheek MD for Rectal bleeding [K62.5]  Hydronephrosis of left kidney [N13.30]  Malignant neoplasm of urinary bladder, unspecified site (HCC) [C67.9]  Acute renal failure, unspecified acute renal failure type (HCC) [N17.9]  Hydronephrosis due to obstruction of ureter [N13.1]  POD# 2 Days Post-Op Procedure(s):  COLONOSCOPY DIAGNOSTIC    Jose Fuentes is doing well. No complaints. Eager to go home. Agitated from transfer to a new room at 2 AM.   Serum cr continues to improve. Good urine output from nephrostomy tube. Voiding spontaneously occasionally.     Vitals: Temp (24hrs), Av.2 °F (36.8 °C), Min:97.7 °F (36.5 °C), Max:98.6 °F (37 °C)    Blood pressure 131/73, pulse 78, temperature 98.2 °F (36.8 °C), temperature source Oral, resp. rate 18, height 1.753 m (5' 9\"), weight 75.6 kg (166 lb 10.7 oz), SpO2 93%.    Intake and Output:   1901 -  0700  In: -   Out: 3450 [Urine:3450]  No intake/output data recorded.  TICO Output lats 24 hrs: No data found.   TICO Output last 8 hrs: No data found.  BM over last 24 hrs: No data found.    Physical Exam  General: NAD, pleasant  Respiratory: no distress, breathing easily, room air  Abdomen: soft, no distention; non-tender to palpation  : no CVA tenderness, Left nephrostomy tube with yellow urine, some tenderness around drain, site intact   Neuro: Appropriate, no focal neurological deficits  Skin: warm, dry  Extremities: moves all, full ROM    Labs:  CBC:   Lab Results   Component Value Date/Time    WBC 8.5 2024 07:24 AM    HCT 30.3 2024 07:24 AM     2024 07:24 AM     BMP:   Lab Results   Component Value Date/Time     2024 03:32 AM    K 4.1 2024 03:32 AM     2024 03:32 AM    CO2 25 2024 03:32 AM    BUN 52 2024 03:32 AM   Cr 
[facility] [address] [address]       GI PROGRESS NOTE  Will VICTORINO Foster  [phone] office      NAME: Jose Fuentes   :  1940   MRN:  [id number]       Subjective:   Colonoscopy was done yesterday.  No further rectal bleeding.  No bowel movement today.  No nausea, vomiting, or abdominal pain.  Family is at the bedside.     Objective:     VITALS:   Last 24hrs VS reviewed since prior progress note. Most recent are:  Vitals:    24 0931   BP: 127/66   Pulse: 80   Resp: 17   Temp:    SpO2:      PHYSICAL EXAM:  General: Cooperative, no acute distress    Neurologic:  Alert and oriented X 3  HEENT: EOMI, no scleral icterus   Lungs:  No acute respiratory distress  Heart:  S1 S2  Abdomen: Soft, non-distended, no tenderness, no guarding, no rebound. +Bowel sounds.   Extremities: Warm  Psych:   Not anxious or agitated    Lab Data Reviewed:     Recent Results (from the past 24 hour(s))   Basic Metabolic Panel    Collection Time: 24  3:32 AM   Result Value Ref Range    Sodium 145 136 - 145 mmol/L    Potassium 4.1 3.5 - 5.1 mmol/L    Chloride 114 (H) 97 - 108 mmol/L    CO2 25 21 - 32 mmol/L    Anion Gap 6 5 - 15 mmol/L    Glucose 92 65 - 100 mg/dL    BUN 52 (H) 6 - 20 MG/DL    Creatinine 4.60 (H) 0.70 - 1.30 MG/DL    BUN/Creatinine Ratio 11 (L) 12 - 20      Est, Glom Filt Rate 12 (L) >60 ml/min/1.73m2    Calcium 9.4 8.5 - 10.1 MG/DL   CBC with Auto Differential    Collection Time: 24  3:32 AM   Result Value Ref Range    WBC 10.7 4.1 - 11.1 K/uL    RBC 3.28 (L) 4.10 - 5.70 M/uL    Hemoglobin 10.1 (L) 12.1 - 17.0 g/dL    Hematocrit 31.7 (L) 36.6 - 50.3 %    MCV 96.6 80.0 - 99.0 FL    MCH 30.8 26.0 - 34.0 PG    MCHC 31.9 30.0 - 36.5 g/dL    RDW 13.5 11.5 - 14.5 %    Platelets 218 150 - 400 K/uL    MPV 9.7 8.9 - 12.9 FL    Nucleated RBCs 0.0 0  WBC    nRBC 0.00 0.00 - 0.01 K/uL    Neutrophils % 67 32 - 75 %    Lymphocytes % 21 12 - 49 %    Monocytes % 7 5 - 13 % 
Patient off floor   Renal function improving  Continue to trend  Psa elevated - known hx of met prostate cancer  Will fu testosterone to see if he is castrate  
Patient was non compliant with completing his bowel prep on night shift. This scribe educated patient on the importance of completing prep and emptying bowel contents for colonoscopy. Patient still remained non compliant. Patient drank only half of the amount provided. MD alvarenga.   
Spiritual Health Assessment/Progress Note  [facility]    Initial Encounter,  ,  ,      Name: Jsoe Fuentes MRN: [id number]    Age: 84 y.o.     Sex: male   Language: English   Sabianist: Jehovah's witness   Hydronephrosis due to obstruction of ureter     Date: 8/12/2024            Total Time Calculated: 9 min              Spiritual Assessment began in [facility] 4 IM 2        Referral/Consult From: Rounding   Encounter Overview/Reason: Initial Encounter  Service Provided For: Patient and family together    Lashaun, Belief, Meaning:   Patient is connected with a lashaun tradition or spiritual practice and Other: The patient is Jehovah's witness.  Family/Friends are connected with a lashaun tradition or spiritual practice and have beliefs or practices that help with coping during difficult times      Importance and Influence:  Patient has spiritual/personal beliefs that influence decisions regarding their health  Family/Friends have spiritual/personal beliefs that influence decisions regarding the patient's health    Community:  Patient feels well-supported. Support system includes: Children and Lashaun Community  Family/Friends feel well-supported. Support system includes: Children and Lashaun Community    Assessment and Plan of Care:     Patient Interventions include: Facilitated expression of thoughts and feelings  Family/Friends Interventions include: Family/Friends Interventions    Patient Plan of Care: Spiritual Care available upon further referral  Family/Friends Plan of Care: Spiritual Care available upon further referral    Electronically signed by Michael Waldronlain Resident on 8/12/2024 at 1:56 PM    
TRANSFER - OUT REPORT:    Verbal report given to GÓMEZ Stern on Jose Fuentes  being transferred to [facility] for routine progression of patient care       Report consisted of patient's Situation, Background, Assessment and   Recommendations(SBAR).     Information from the following report(s) Nurse Handoff Report, MAR, and Event Log was reviewed with the receiving nurse.    [place] Fall Assessment:    Presents to emergency department  because of falls (Syncope, seizure, or loss of consciousness): No  Age > 70: Yes  Altered Mental Status, Intoxication with alcohol or substance confusion (Disorientation, impaired judgment, poor safety awaremess, or inability to follow instructions): No  Impaired Mobility: Ambulates or transfers with assistive devices or assistance; Unable to ambulate or transer.: Yes  Nursing Judgement: Yes          Lines:   Peripheral IV 08/10/24 Left Antecubital (Active)   Site Assessment Clean, dry & intact 08/10/24 0029        Opportunity for questions and clarification was provided.      Patient transported with:  Tech            
    ________________________________________________________________________  Carlos Whitaker MD     Procedures: see electronic medical records for all procedures/Xrays and details which  were not copied into this note but were reviewed prior to creation of Plan.      LABS:  Recent Labs     08/12/24 0332 08/13/24 0724   WBC 10.7 8.5   HGB 10.1* 10.0*   HCT 31.7* 30.3*    229     Recent Labs     08/11/24  0302 08/12/24 0332 08/13/24 0724    145 143   K 4.6 4.1 3.4*   * 114* 111*   CO2 20* 25 24   BUN 70* 52* 30*     Recent Labs     08/11/24  0302   GLOB 3.8     No results for input(s): \"INR\", \"APTT\" in the last 72 hours.    Invalid input(s): \"PTP\"     No results for input(s): \"TIBC\" in the last 72 hours.    Invalid input(s): \"FE\", \"PSAT\", \"FERR\"     MEDICATIONS:  Current Facility-Administered Medications   Medication Dose Route Frequency    aspirin EC tablet 81 mg  81 mg Oral Daily    clopidogrel (PLAVIX) tablet 75 mg  75 mg Oral Daily    sodium chloride flush 0.9 % injection 5-40 mL  5-40 mL IntraVENous 2 times per day    sodium chloride flush 0.9 % injection 5-40 mL  5-40 mL IntraVENous PRN    0.9 % sodium chloride infusion   IntraVENous PRN    ondansetron (ZOFRAN-ODT) disintegrating tablet 4 mg  4 mg Oral Q8H PRN    Or    ondansetron (ZOFRAN) injection 4 mg  4 mg IntraVENous Q6H PRN    acetaminophen (TYLENOL) tablet 650 mg  650 mg Oral Q6H PRN    Or    acetaminophen (TYLENOL) suppository 650 mg  650 mg Rectal Q6H PRN    atorvastatin (LIPITOR) tablet 40 mg  40 mg Oral Nightly    bisacodyl (DULCOLAX) EC tablet 5 mg  5 mg Oral Daily    cefTRIAXone (ROCEPHIN) 1,000 mg in sterile water 10 mL IV syringe  1,000 mg IntraVENous Q24H       
H&P  ________________________________________________________________________  Care Plan discussed with:  Patient     Family      RN     Care Manager                    Consultant:          Comments   >50% of visit spent in counseling and coordination of care       ________________________________________________________________________  Isma Mayer MD     Procedures: see electronic medical records for all procedures/Xrays and details which  were not copied into this note but were reviewed prior to creation of Plan.      LABS:  Recent Labs     08/10/24  0609 08/11/24  0302   WBC 13.7* 12.4*   HGB 9.8* 10.5*   HCT 29.8* 33.2*    212     Recent Labs     08/09/24  2248 08/10/24  0609 08/11/24  0302    138 141   K 5.1 5.2* 4.6   * 110* 114*   CO2 15* 15* 20*   BUN 99* 99* 70*   PHOS  --  5.4*  --      Recent Labs     08/09/24  2248 08/10/24  0609 08/11/24  0302   GLOB 4.0 3.3 3.8     Recent Labs     08/10/24  0609   INR 1.1   APTT 29.8      Recent Labs     08/10/24  0609   TIBC 161*      No results found for: \"RBCF\"   No results for input(s): \"PH\", \"PCO2\", \"PO2\" in the last 72 hours.  No results for input(s): \"CPK\", \"CKMB\", \"TROPONINI\" in the last 72 hours.  No components found for: \"GLPOC\"  @labua@    MEDICATIONS:  Current Facility-Administered Medications   Medication Dose Route Frequency    sodium chloride flush 0.9 % injection 5-40 mL  5-40 mL IntraVENous 2 times per day    sodium chloride flush 0.9 % injection 5-40 mL  5-40 mL IntraVENous PRN    0.9 % sodium chloride infusion   IntraVENous PRN    ondansetron (ZOFRAN-ODT) disintegrating tablet 4 mg  4 mg Oral Q8H PRN    Or    ondansetron (ZOFRAN) injection 4 mg  4 mg IntraVENous Q6H PRN    acetaminophen (TYLENOL) tablet 650 mg  650 mg Oral Q6H PRN    Or    acetaminophen (TYLENOL) suppository 650 mg  650 mg Rectal Q6H PRN    atorvastatin (LIPITOR) tablet 40 mg  40 mg Oral Nightly    bisacodyl (DULCOLAX) EC tablet 5 mg  5 mg Oral Daily    0.9

## 2024-08-15 ENCOUNTER — OFFICE VISIT (OUTPATIENT)
Age: 84
End: 2024-08-15
Payer: MEDICARE

## 2024-08-15 VITALS
WEIGHT: 153.4 LBS | HEIGHT: 69 IN | BODY MASS INDEX: 22.72 KG/M2 | TEMPERATURE: 97.5 F | DIASTOLIC BLOOD PRESSURE: 62 MMHG | HEART RATE: 98 BPM | OXYGEN SATURATION: 97 % | SYSTOLIC BLOOD PRESSURE: 100 MMHG

## 2024-08-15 DIAGNOSIS — C61 PROSTATE CANCER (HCC): ICD-10-CM

## 2024-08-15 DIAGNOSIS — C67.9 METASTASIS FROM BLADDER CANCER (HCC): Primary | ICD-10-CM

## 2024-08-15 DIAGNOSIS — I25.10 ATHEROSCLEROSIS OF NATIVE CORONARY ARTERY OF NATIVE HEART WITHOUT ANGINA PECTORIS: ICD-10-CM

## 2024-08-15 DIAGNOSIS — Z95.5 PRESENCE OF CORONARY ANGIOPLASTY IMPLANT AND GRAFT: ICD-10-CM

## 2024-08-15 DIAGNOSIS — N18.32 STAGE 3B CHRONIC KIDNEY DISEASE (HCC): ICD-10-CM

## 2024-08-15 DIAGNOSIS — R64 CACHEXIA (HCC): ICD-10-CM

## 2024-08-15 DIAGNOSIS — Z93.6 NEPHROSTOMY STATUS (HCC): ICD-10-CM

## 2024-08-15 DIAGNOSIS — C79.9 METASTASIS FROM BLADDER CANCER (HCC): Primary | ICD-10-CM

## 2024-08-15 PROBLEM — N18.30 CHRONIC RENAL DISEASE, STAGE III (HCC): Status: ACTIVE | Noted: 2024-08-15

## 2024-08-15 PROCEDURE — 99214 OFFICE O/P EST MOD 30 MIN: CPT | Performed by: INTERNAL MEDICINE

## 2024-08-15 PROCEDURE — 1036F TOBACCO NON-USER: CPT | Performed by: INTERNAL MEDICINE

## 2024-08-15 PROCEDURE — 1123F ACP DISCUSS/DSCN MKR DOCD: CPT | Performed by: INTERNAL MEDICINE

## 2024-08-15 PROCEDURE — G8420 CALC BMI NORM PARAMETERS: HCPCS | Performed by: INTERNAL MEDICINE

## 2024-08-15 PROCEDURE — 3078F DIAST BP <80 MM HG: CPT | Performed by: INTERNAL MEDICINE

## 2024-08-15 PROCEDURE — 1111F DSCHRG MED/CURRENT MED MERGE: CPT | Performed by: INTERNAL MEDICINE

## 2024-08-15 PROCEDURE — G8427 DOCREV CUR MEDS BY ELIG CLIN: HCPCS | Performed by: INTERNAL MEDICINE

## 2024-08-15 PROCEDURE — 3074F SYST BP LT 130 MM HG: CPT | Performed by: INTERNAL MEDICINE

## 2024-08-15 NOTE — PROGRESS NOTES
Chief Complaint   Patient presents with    Follow-Up from Hospital     \"Have you been to the ER, urgent care clinic since your last visit?  Hospitalized since your last visit?\"    YES - When: approximately 1  weeks ago.  Where and Why: [facility].    “Have you seen or consulted any other health care providers outside of [facility] since your last visit?”    NO            Click Here for Release of Records Request

## 2024-08-23 ENCOUNTER — TELEPHONE (OUTPATIENT)
Age: 84
End: 2024-08-23

## 2024-08-23 NOTE — TELEPHONE ENCOUNTER
Pt hasn't received any calls from Home Health since being discharged from the hospital.   Pt has a bag that needs to be changed out routinely and has concerns about it leading to infection without proper care.     Please return call to Shailesh (son) or Shelbie.

## 2024-08-27 ASSESSMENT — ENCOUNTER SYMPTOMS
RESPIRATORY NEGATIVE: 1
ABDOMINAL PAIN: 0
CONSTIPATION: 1
EYES NEGATIVE: 1
ALLERGIC/IMMUNOLOGIC NEGATIVE: 1
SHORTNESS OF BREATH: 0

## 2024-08-27 NOTE — PROGRESS NOTES
Subjective    Jose Fuentes is a 84 y.o. male who presents today for the following:  Chief Complaint   Patient presents with    Follow-Up from Hospital       History of Present Illness  The patient presents for evaluation of multiple medical concerns. He is accompanied by an adult female.    He has been experiencing difficulty urinating, rectal bleeding, and penile bleeding.  I reviewed records in EMR.  Has metastatic bladder cancer.  Also history of prostate cancer.  Imaging showed hydronephrosis.  He has a nephrostomy tube in place and is currently taking cephalexin three times a day. He has not yet started his probiotic regimen. He has an upcoming appointment with a urologist on 09/10/2024.    Over the past 1.5 months, he has lost a significant amount of weight due to a lack of appetite and decreased mobility. He reports frequent vomiting, which led to his recent hospital admission. During this period, he also had a fall but did not sustain a head injury. His bowel movements are regular, but he has noticed blood stains in his underwear on several occasions. His stool color was normal this morning. He underwent a colonoscopy during his hospital stay, which revealed six polyps. He has been receiving physical therapy at home.    He is also on Lipitor for cholesterol management and Plavix as a blood thinner. He continues to take baby aspirin. He has a heart stent in place and is scheduled for a follow-up with his cardiologist. He reports no chest pain or shortness of breath.    His sleep has been disrupted since his hospital discharge due to back pain from a surgical incision. He has been taking ibuprofen for pain relief. He reports no headaches or dizziness upon standing.    SOCIAL HISTORY  The patient quit smoking 1 year ago.    PMH/PSH/Allergies/Social History/medication list and most recent studies reviewed with patient.    Reports compliance with medications and diet. Trying to be active physically as    Musculoskeletal:  Positive for arthralgias and gait problem.   Skin: Negative.    Allergic/Immunologic: Negative.    Hematological: Negative.    Psychiatric/Behavioral: Negative.     All other systems reviewed and are negative.        Objective    Physical Exam  Constitutional:       General: He is not in acute distress.     Appearance: Normal appearance.      Comments: Frail cachectic man   HENT:      Head: Normocephalic and atraumatic.      Mouth/Throat:      Mouth: Mucous membranes are moist.      Pharynx: Oropharynx is clear.   Eyes:      Conjunctiva/sclera: Conjunctivae normal.      Pupils: Pupils are equal, round, and reactive to light.   Neck:      Vascular: No carotid bruit.   Cardiovascular:      Rate and Rhythm: Normal rate and regular rhythm.      Pulses: Normal pulses.      Heart sounds: Normal heart sounds. No murmur heard.  Pulmonary:      Effort: No respiratory distress.      Breath sounds: Normal breath sounds. No wheezing or rales.   Abdominal:      General: Bowel sounds are normal. There is no distension.      Palpations: Abdomen is soft.      Tenderness: There is no abdominal tenderness. There is no right CVA tenderness or left CVA tenderness.   Genitourinary:     Comments: Nephrostomy tube in place  Clear yellow urine in the bag  Musculoskeletal:         General: No tenderness.      Cervical back: Normal range of motion and neck supple.      Right lower leg: No edema.      Left lower leg: No edema.      Comments: DJD changes   Lymphadenopathy:      Cervical: No cervical adenopathy.   Skin:     General: Skin is warm and dry.      Findings: No rash.   Neurological:      General: No focal deficit present.      Mental Status: He is alert and oriented to person, place, and time.      Motor: Weakness (Generalized) present.      Gait: Gait abnormal.   Psychiatric:         Mood and Affect: Mood normal.         Behavior: Behavior normal.            Assessment & Plan   Diagnosis Orders   1. Metastasis

## 2024-09-04 ENCOUNTER — APPOINTMENT (OUTPATIENT)
Facility: HOSPITAL | Age: 84
End: 2024-09-04
Payer: MEDICARE

## 2024-09-04 ENCOUNTER — HOSPITAL ENCOUNTER (EMERGENCY)
Facility: HOSPITAL | Age: 84
Discharge: HOME | End: 2024-09-04
Attending: EMERGENCY MEDICINE
Payer: MEDICARE

## 2024-09-04 VITALS
RESPIRATION RATE: 16 BRPM | HEART RATE: 89 BPM | TEMPERATURE: 97.3 F | DIASTOLIC BLOOD PRESSURE: 65 MMHG | BODY MASS INDEX: 22.24 KG/M2 | SYSTOLIC BLOOD PRESSURE: 139 MMHG | OXYGEN SATURATION: 94 % | WEIGHT: 150.57 LBS

## 2024-09-04 DIAGNOSIS — N18.9 CHRONIC KIDNEY DISEASE, UNSPECIFIED CKD STAGE: ICD-10-CM

## 2024-09-04 DIAGNOSIS — N99.528 NEPHROSTOMY COMPLICATION (HCC): Primary | ICD-10-CM

## 2024-09-04 DIAGNOSIS — C67.9 MALIGNANT NEOPLASM OF URINARY BLADDER, UNSPECIFIED SITE (HCC): ICD-10-CM

## 2024-09-04 LAB
ALBUMIN SERPL-MCNC: 3.5 G/DL (ref 3.5–5)
ALBUMIN/GLOB SERPL: 1 (ref 1.1–2.2)
ALP SERPL-CCNC: 75 U/L (ref 45–117)
ALT SERPL-CCNC: 10 U/L (ref 12–78)
ANION GAP SERPL CALC-SCNC: 3 MMOL/L (ref 5–15)
AST SERPL-CCNC: 24 U/L (ref 15–37)
BASOPHILS # BLD: 0.1 K/UL (ref 0–0.1)
BASOPHILS NFR BLD: 1 % (ref 0–1)
BILIRUB SERPL-MCNC: 0.3 MG/DL (ref 0.2–1)
BUN SERPL-MCNC: 38 MG/DL (ref 6–20)
BUN/CREAT SERPL: 15 (ref 12–20)
CALCIUM SERPL-MCNC: 10.5 MG/DL (ref 8.5–10.1)
CHLORIDE SERPL-SCNC: 113 MMOL/L (ref 97–108)
CO2 SERPL-SCNC: 23 MMOL/L (ref 21–32)
COMMENT:: NORMAL
CREAT SERPL-MCNC: 2.57 MG/DL (ref 0.7–1.3)
DIFFERENTIAL METHOD BLD: ABNORMAL
EOSINOPHIL # BLD: 0.6 K/UL (ref 0–0.4)
EOSINOPHIL NFR BLD: 6 % (ref 0–7)
ERYTHROCYTE [DISTWIDTH] IN BLOOD BY AUTOMATED COUNT: 15.5 % (ref 11.5–14.5)
GLOBULIN SER CALC-MCNC: 3.4 G/DL (ref 2–4)
GLUCOSE SERPL-MCNC: 101 MG/DL (ref 65–100)
HCT VFR BLD AUTO: 34.3 % (ref 36.6–50.3)
HGB BLD-MCNC: 10.8 G/DL (ref 12.1–17)
IMM GRANULOCYTES # BLD AUTO: 0 K/UL (ref 0–0.04)
IMM GRANULOCYTES NFR BLD AUTO: 0 % (ref 0–0.5)
LYMPHOCYTES # BLD: 2 K/UL (ref 0.8–3.5)
LYMPHOCYTES NFR BLD: 21 % (ref 12–49)
MCH RBC QN AUTO: 31 PG (ref 26–34)
MCHC RBC AUTO-ENTMCNC: 31.5 G/DL (ref 30–36.5)
MCV RBC AUTO: 98.6 FL (ref 80–99)
MONOCYTES # BLD: 0.7 K/UL (ref 0–1)
MONOCYTES NFR BLD: 7 % (ref 5–13)
NEUTS SEG # BLD: 6.5 K/UL (ref 1.8–8)
NEUTS SEG NFR BLD: 66 % (ref 32–75)
NRBC # BLD: 0 K/UL (ref 0–0.01)
NRBC BLD-RTO: 0 PER 100 WBC
PLATELET # BLD AUTO: 201 K/UL (ref 150–400)
PMV BLD AUTO: 10 FL (ref 8.9–12.9)
POTASSIUM SERPL-SCNC: 4.1 MMOL/L (ref 3.5–5.1)
PROT SERPL-MCNC: 6.9 G/DL (ref 6.4–8.2)
RBC # BLD AUTO: 3.48 M/UL (ref 4.1–5.7)
SODIUM SERPL-SCNC: 139 MMOL/L (ref 136–145)
SPECIMEN HOLD: NORMAL
WBC # BLD AUTO: 9.8 K/UL (ref 4.1–11.1)

## 2024-09-04 PROCEDURE — 99284 EMERGENCY DEPT VISIT MOD MDM: CPT

## 2024-09-04 PROCEDURE — 36415 COLL VENOUS BLD VENIPUNCTURE: CPT

## 2024-09-04 PROCEDURE — 85025 COMPLETE CBC W/AUTO DIFF WBC: CPT

## 2024-09-04 PROCEDURE — 80053 COMPREHEN METABOLIC PANEL: CPT

## 2024-09-04 PROCEDURE — 74176 CT ABD & PELVIS W/O CONTRAST: CPT

## 2024-09-04 RX ORDER — SODIUM CHLORIDE 9 MG/ML
INJECTION, SOLUTION INTRAVENOUS CONTINUOUS
Status: DISCONTINUED | OUTPATIENT
Start: 2024-09-04 | End: 2024-09-04 | Stop reason: HOSPADM

## 2024-09-04 RX ORDER — MORPHINE SULFATE 4 MG/ML
4 INJECTION, SOLUTION INTRAMUSCULAR; INTRAVENOUS
Status: DISCONTINUED | OUTPATIENT
Start: 2024-09-04 | End: 2024-09-04 | Stop reason: HOSPADM

## 2024-09-04 ASSESSMENT — PAIN DESCRIPTION - DESCRIPTORS: DESCRIPTORS: OTHER (COMMENT)

## 2024-09-04 ASSESSMENT — PAIN - FUNCTIONAL ASSESSMENT
PAIN_FUNCTIONAL_ASSESSMENT: PREVENTS OR INTERFERES WITH ALL ACTIVE AND SOME PASSIVE ACTIVITIES
PAIN_FUNCTIONAL_ASSESSMENT: 0-10

## 2024-09-04 ASSESSMENT — ENCOUNTER SYMPTOMS: SHORTNESS OF BREATH: 0

## 2024-09-04 ASSESSMENT — PAIN DESCRIPTION - LOCATION: LOCATION: BACK

## 2024-09-04 ASSESSMENT — PAIN SCALES - GENERAL: PAINLEVEL_OUTOF10: 6

## 2024-09-04 ASSESSMENT — PAIN DESCRIPTION - ORIENTATION: ORIENTATION: LEFT

## 2024-09-04 NOTE — ED NOTES
Dr. Alfaro reviewed discharge instructions with the patient.  The patient verbalized understanding.  All questions and concerns were addressed.  The patient is discharged via wheelchair in the care of family members with instructions and prescriptions in hand.  Pt is alert and oriented x 4.  Respirations are clear and unlabored.

## 2024-09-04 NOTE — CONSULTS
Requesting Provider: Mirna Alfaro MD              Patient: Jose Fuentes MRN: [id number]  SSN: [id number]    YOB: 1940  Age: 84 y.o.  Sex: male     Location: [facility]/       Code Status: Prior   PCP: Tez Srinivasan DO  - [phone]   Emergency Contact:  Primary Emergency Contact: Shailesh Fuentes, Home Phone: [phone]   Race/Congregational/Language: Other / Spiritism / Speaks English   Payor: Payor: [employer] MEDICARE / Plan: [employer] GOLD PLUS HMO / Product Type: *No Product type* /    Prior Admission Data: 8/13/24 [address] ONCOLOGY Katt Perez   Hospitalized:  Hospital Day: 1 - Admitted 9/4/2024  8:13 AM     CONSULTANTS  IP CONSULT TO INTERVENTIONAL RADIOLOGY  IP CONSULT TO UROLOGY   ADMISSION DIAGNOSES  [unfilled]      Assessment/Plan:       83 yo M with 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 The valve was removed and the neph tube irrigated and drained easily thereafter. Please provide patient with a new drainage bag and saline flushes to flush the tube at home to keep it patent and draining. Stable for discharge from Urology standpoint. Follow up with his Urologist next week on 9/10/24.      CC: @JUAN [email or web address]@   HPI: He is a 84 y.o. male with hx of castrate resistant metastatic prostate cancer. Has been followed by Dr. BARBIE Rangel. Recently started Nubeqa. In August he was found to have renal failure from malignant obstruction with severe left hydronephrosis, the right kidney is partially atrophic.  He underwent left nephrostomy tube placement with improvement in his serum cr from 12.10 to 2.48 at the time of discharge. He presents today with cc of no urine output from his nephrostomy tube x 1 day. Associated severe constant left flank pain and abdominal pain. Denies recent illness, fevers, chills, n/v.  125/61 -- -- -- -- 94 % --   09/04/24 1002 -- -- -- -- -- 91 % --   09/04/24 1001 107/60 -- -- -- -- -- --   09/04/24 0810 103/62 97.3 °F (36.3 °C) Oral 89 16 97 % 68.3 kg (150 lb 9.2 oz)       [unfilled]   (2) ENMT:   moist mucous membranes, no nasal drainage or congestion    (3) Respiratory:  breathing easily, unlabored, no distress   (4) GI:  no abdominal masses, LLQ tenderness    (5) :   Voiding independently, left CVA tenderness; found to have a valve between the nephrostomy tube and drainage bag obstructed with debris, the neph tube was disconnected from the drainage bag with immediate output of urine, I flushed the tube with NS which irrigated easily, 100 cc of yellow urine immediately drained. The patient's pain resolved.    (6) Lymphatic:  no adenopathy or edema   (7) Muscloskeletal:  no gross deformity, Normal ROM   (8) Skin:  no rash, warm, dry    (9) Neuro:  no focal deficits, Alert      Signed By: MADELAINE Banks - NP  - September 4, 2024

## 2024-09-04 NOTE — ED NOTES
8:10 AM    Patient is an 84 y.o. male with history of prostate cancer, bladder cancer, with nephrostomy placed 3 weeks ago who presents to the ER with reports of incisional site pain, decreased urine output since 9am. Son reports home health came today to change the dressing for the first time in 3 weeks.     I have evaluated the patient as the Provider in Rapid Medical Evaluation (RME). I have reviewed his vital signs and the triage nurse assessment. I have talked with the patient and any available family and advised that I am the provider in triage and have ordered the appropriate study to initiate their work up based on the clinical presentation during my assessment. I have advised that the patient will be accommodated in the Main ED as soon as possible. I have also requested to contact the triage nurse or myself immediately if the patient experiences any changes in their condition during this brief waiting period.    VICTORINO Leigh Reagan, PA-C  09/04/24 0812

## 2024-09-04 NOTE — ED TRIAGE NOTES
Pt has a nephrostomy tube and has had no out put since yesterday, left sided, denies fever , denies n/v, no blood in tube, had dressing changed yesterday, did not look at the tube to see if it was kinked

## 2024-09-04 NOTE — DISCHARGE INSTRUCTIONS
Please follow closely with your urologist as discussed by urology provider today and return to the emergency department for any new or worsening symptoms    Flush Nephrostomy tube with 10 cc Normal Saline every 2-3 days to keep it draining.

## 2024-09-04 NOTE — ED PROVIDER NOTES
Skin:     General: Skin is warm and dry.   Neurological:      Mental Status: He is alert and oriented to person, place, and time.   Psychiatric:         Mood and Affect: Mood normal.         Behavior: Behavior normal.         Thought Content: Thought content normal.         Judgment: Judgment normal.         DIAGNOSTIC RESULTS     EKG: All EKG's are interpreted by the Emergency Department Physician who either signs or Co-signs this chart in the absence of a cardiologist.        RADIOLOGY:   Non-plain film images such as CT, Ultrasound and MRI are read by the radiologist. Plain radiographic images are visualized and preliminarily interpreted by the emergency physician with the below findings:        Interpretation per the Radiologist below, if available at the time of this note:    CT ABDOMEN PELVIS WO CONTRAST Additional Contrast? Radiologist Recommendation   Final Result      1. Bladder tumor again seen. Unchanged metastasis along the left pelvic sidewall   obstructing the distal left ureter.   2. Interval placement of a left nephrostomy tube. The tube is in appropriate   position, but moderate left hydroureteronephrosis remains.   3. New 5 mm stone in the distal left ureter.   4. Unchanged mild right hydroureteronephrosis and renal atrophy. Unchanged   indeterminate lesion in the medial right kidney.   5. Interval increase in retroperitoneal and pelvic lymphadenopathy.      Electronically signed by RYAN NELSON           LABS:  Labs Reviewed   CBC WITH AUTO DIFFERENTIAL - Abnormal; Notable for the following components:       Result Value    RBC 3.48 (*)     Hemoglobin 10.8 (*)     Hematocrit 34.3 (*)     RDW 15.5 (*)     Eosinophils Absolute 0.6 (*)     All other components within normal limits   COMPREHENSIVE METABOLIC PANEL - Abnormal; Notable for the following components:    Chloride 113 (*)     Anion Gap 3 (*)     Glucose 101 (*)     BUN 38 (*)     Creatinine 2.57 (*)     Est, Glom Filt Rate 24 (*)

## 2024-09-18 ENCOUNTER — TELEPHONE (OUTPATIENT)
Age: 84
End: 2024-09-18

## 2024-09-19 ENCOUNTER — HOSPITAL ENCOUNTER (INPATIENT)
Facility: HOSPITAL | Age: 84
LOS: 7 days | Discharge: HOME HEALTH | DRG: 871 | End: 2024-09-26
Attending: EMERGENCY MEDICINE | Admitting: INTERNAL MEDICINE
Payer: MEDICARE

## 2024-09-19 ENCOUNTER — TELEPHONE (OUTPATIENT)
Age: 84
End: 2024-09-19

## 2024-09-19 ENCOUNTER — APPOINTMENT (OUTPATIENT)
Facility: HOSPITAL | Age: 84
DRG: 871 | End: 2024-09-19
Payer: MEDICARE

## 2024-09-19 DIAGNOSIS — D72.829 LEUKOCYTOSIS, UNSPECIFIED TYPE: ICD-10-CM

## 2024-09-19 DIAGNOSIS — E86.0 DEHYDRATION: ICD-10-CM

## 2024-09-19 DIAGNOSIS — C79.9 METASTASIS FROM BLADDER CANCER (HCC): Primary | ICD-10-CM

## 2024-09-19 DIAGNOSIS — R11.2 NAUSEA AND VOMITING, UNSPECIFIED VOMITING TYPE: ICD-10-CM

## 2024-09-19 DIAGNOSIS — C67.9 METASTASIS FROM BLADDER CANCER (HCC): Primary | ICD-10-CM

## 2024-09-19 PROBLEM — A41.9 SEPSIS (HCC): Status: ACTIVE | Noted: 2024-09-19

## 2024-09-19 LAB
ALBUMIN SERPL-MCNC: 3 G/DL (ref 3.5–5)
ALBUMIN/GLOB SERPL: 0.7 (ref 1.1–2.2)
ALP SERPL-CCNC: 69 U/L (ref 45–117)
ALT SERPL-CCNC: 13 U/L (ref 12–78)
ANION GAP SERPL CALC-SCNC: 10 MMOL/L (ref 2–12)
AST SERPL-CCNC: 19 U/L (ref 15–37)
BASOPHILS # BLD: 0.1 K/UL (ref 0–0.1)
BASOPHILS NFR BLD: 0 % (ref 0–1)
BILIRUB SERPL-MCNC: 0.4 MG/DL (ref 0.2–1)
BUN SERPL-MCNC: 46 MG/DL (ref 6–20)
BUN/CREAT SERPL: 18 (ref 12–20)
CALCIUM SERPL-MCNC: 10.5 MG/DL (ref 8.5–10.1)
CHLORIDE SERPL-SCNC: 104 MMOL/L (ref 97–108)
CO2 SERPL-SCNC: 21 MMOL/L (ref 21–32)
COMMENT:: NORMAL
CREAT SERPL-MCNC: 2.57 MG/DL (ref 0.7–1.3)
DIFFERENTIAL METHOD BLD: ABNORMAL
EOSINOPHIL # BLD: 0 K/UL (ref 0–0.4)
EOSINOPHIL NFR BLD: 0 % (ref 0–7)
ERYTHROCYTE [DISTWIDTH] IN BLOOD BY AUTOMATED COUNT: 15.3 % (ref 11.5–14.5)
GLOBULIN SER CALC-MCNC: 4.4 G/DL (ref 2–4)
GLUCOSE SERPL-MCNC: 132 MG/DL (ref 65–100)
HCT VFR BLD AUTO: 33.7 % (ref 36.6–50.3)
HGB BLD-MCNC: 11.1 G/DL (ref 12.1–17)
IMM GRANULOCYTES # BLD AUTO: 0.1 K/UL (ref 0–0.04)
IMM GRANULOCYTES NFR BLD AUTO: 1 % (ref 0–0.5)
LIPASE SERPL-CCNC: 43 U/L (ref 13–75)
LYMPHOCYTES # BLD: 1 K/UL (ref 0.8–3.5)
LYMPHOCYTES NFR BLD: 5 % (ref 12–49)
MCH RBC QN AUTO: 31.7 PG (ref 26–34)
MCHC RBC AUTO-ENTMCNC: 32.9 G/DL (ref 30–36.5)
MCV RBC AUTO: 96.3 FL (ref 80–99)
MONOCYTES # BLD: 1.1 K/UL (ref 0–1)
MONOCYTES NFR BLD: 5 % (ref 5–13)
NEUTS SEG # BLD: 18.8 K/UL (ref 1.8–8)
NEUTS SEG NFR BLD: 89 % (ref 32–75)
NRBC # BLD: 0 K/UL (ref 0–0.01)
NRBC BLD-RTO: 0 PER 100 WBC
PLATELET # BLD AUTO: 235 K/UL (ref 150–400)
PMV BLD AUTO: 10.1 FL (ref 8.9–12.9)
POTASSIUM SERPL-SCNC: 4.6 MMOL/L (ref 3.5–5.1)
PROT SERPL-MCNC: 7.4 G/DL (ref 6.4–8.2)
RBC # BLD AUTO: 3.5 M/UL (ref 4.1–5.7)
SODIUM SERPL-SCNC: 135 MMOL/L (ref 136–145)
SPECIMEN HOLD: NORMAL
WBC # BLD AUTO: 21.1 K/UL (ref 4.1–11.1)

## 2024-09-19 PROCEDURE — 6360000002 HC RX W HCPCS: Performed by: EMERGENCY MEDICINE

## 2024-09-19 PROCEDURE — 36415 COLL VENOUS BLD VENIPUNCTURE: CPT

## 2024-09-19 PROCEDURE — 83690 ASSAY OF LIPASE: CPT

## 2024-09-19 PROCEDURE — 87077 CULTURE AEROBIC IDENTIFY: CPT

## 2024-09-19 PROCEDURE — 87040 BLOOD CULTURE FOR BACTERIA: CPT

## 2024-09-19 PROCEDURE — 80053 COMPREHEN METABOLIC PANEL: CPT

## 2024-09-19 PROCEDURE — 76770 US EXAM ABDO BACK WALL COMP: CPT

## 2024-09-19 PROCEDURE — 83605 ASSAY OF LACTIC ACID: CPT

## 2024-09-19 PROCEDURE — 2060000000 HC ICU INTERMEDIATE R&B

## 2024-09-19 PROCEDURE — 96374 THER/PROPH/DIAG INJ IV PUSH: CPT

## 2024-09-19 PROCEDURE — 99285 EMERGENCY DEPT VISIT HI MDM: CPT

## 2024-09-19 PROCEDURE — 2580000003 HC RX 258: Performed by: EMERGENCY MEDICINE

## 2024-09-19 PROCEDURE — 2580000003 HC RX 258: Performed by: PHYSICIAN ASSISTANT

## 2024-09-19 PROCEDURE — 6360000002 HC RX W HCPCS

## 2024-09-19 PROCEDURE — 87086 URINE CULTURE/COLONY COUNT: CPT

## 2024-09-19 PROCEDURE — 87186 SC STD MICRODIL/AGAR DIL: CPT

## 2024-09-19 PROCEDURE — 2580000003 HC RX 258: Performed by: INTERNAL MEDICINE

## 2024-09-19 PROCEDURE — 81001 URINALYSIS AUTO W/SCOPE: CPT

## 2024-09-19 PROCEDURE — 87154 CUL TYP ID BLD PTHGN 6+ TRGT: CPT

## 2024-09-19 PROCEDURE — 85025 COMPLETE CBC W/AUTO DIFF WBC: CPT

## 2024-09-19 RX ORDER — 0.9 % SODIUM CHLORIDE 0.9 %
1000 INTRAVENOUS SOLUTION INTRAVENOUS ONCE
Status: COMPLETED | OUTPATIENT
Start: 2024-09-19 | End: 2024-09-19

## 2024-09-19 RX ORDER — ONDANSETRON 2 MG/ML
INJECTION INTRAMUSCULAR; INTRAVENOUS
Status: DISPENSED
Start: 2024-09-19 | End: 2024-09-20

## 2024-09-19 RX ORDER — ONDANSETRON 2 MG/ML
4 INJECTION INTRAMUSCULAR; INTRAVENOUS EVERY 6 HOURS PRN
Status: DISCONTINUED | OUTPATIENT
Start: 2024-09-19 | End: 2024-09-20

## 2024-09-19 RX ORDER — ONDANSETRON 2 MG/ML
INJECTION INTRAMUSCULAR; INTRAVENOUS
Status: COMPLETED
Start: 2024-09-19 | End: 2024-09-19

## 2024-09-19 RX ORDER — SODIUM CHLORIDE, SODIUM LACTATE, POTASSIUM CHLORIDE, CALCIUM CHLORIDE 600; 310; 30; 20 MG/100ML; MG/100ML; MG/100ML; MG/100ML
INJECTION, SOLUTION INTRAVENOUS CONTINUOUS
Status: DISCONTINUED | OUTPATIENT
Start: 2024-09-19 | End: 2024-09-20

## 2024-09-19 RX ADMIN — ONDANSETRON 4 MG: 2 INJECTION INTRAMUSCULAR; INTRAVENOUS at 23:58

## 2024-09-19 RX ADMIN — SODIUM CHLORIDE 1000 ML: 9 INJECTION, SOLUTION INTRAVENOUS at 20:18

## 2024-09-19 RX ADMIN — WATER 1000 MG: 1 INJECTION INTRAMUSCULAR; INTRAVENOUS; SUBCUTANEOUS at 23:46

## 2024-09-19 RX ADMIN — SODIUM CHLORIDE, POTASSIUM CHLORIDE, SODIUM LACTATE AND CALCIUM CHLORIDE: 600; 310; 30; 20 INJECTION, SOLUTION INTRAVENOUS at 23:46

## 2024-09-19 ASSESSMENT — PAIN - FUNCTIONAL ASSESSMENT
PAIN_FUNCTIONAL_ASSESSMENT: NONE - DENIES PAIN
PAIN_FUNCTIONAL_ASSESSMENT: NONE - DENIES PAIN

## 2024-09-20 ENCOUNTER — APPOINTMENT (OUTPATIENT)
Facility: HOSPITAL | Age: 84
DRG: 871 | End: 2024-09-20
Payer: MEDICARE

## 2024-09-20 ENCOUNTER — HOSPITAL ENCOUNTER (INPATIENT)
Facility: HOSPITAL | Age: 84
Discharge: HOME | DRG: 871 | End: 2024-09-23
Payer: MEDICARE

## 2024-09-20 VITALS
OXYGEN SATURATION: 96 % | SYSTOLIC BLOOD PRESSURE: 111 MMHG | RESPIRATION RATE: 15 BRPM | DIASTOLIC BLOOD PRESSURE: 49 MMHG | HEART RATE: 80 BPM | TEMPERATURE: 98.6 F

## 2024-09-20 PROBLEM — E86.0 DEHYDRATION: Status: ACTIVE | Noted: 2024-09-20

## 2024-09-20 PROBLEM — R65.10 SIRS (SYSTEMIC INFLAMMATORY RESPONSE SYNDROME) (HCC): Status: ACTIVE | Noted: 2024-09-20

## 2024-09-20 PROBLEM — R79.89 TROPONIN LEVEL ELEVATED: Status: ACTIVE | Noted: 2024-09-20

## 2024-09-20 PROBLEM — R11.2 NAUSEA AND VOMITING: Status: ACTIVE | Noted: 2024-09-20

## 2024-09-20 PROBLEM — D72.829 LEUKOCYTOSIS: Status: ACTIVE | Noted: 2024-09-20

## 2024-09-20 LAB
ACB COMPLEX DNA BLD POS QL NAA+NON-PROBE: NOT DETECTED
ACCESSION NUMBER, LLC1M: ABNORMAL
ALBUMIN SERPL-MCNC: 2.4 G/DL (ref 3.5–5)
ALBUMIN/GLOB SERPL: 0.6 (ref 1.1–2.2)
ALP SERPL-CCNC: 65 U/L (ref 45–117)
ALT SERPL-CCNC: 12 U/L (ref 12–78)
ANION GAP SERPL CALC-SCNC: 8 MMOL/L (ref 2–12)
APPEARANCE UR: ABNORMAL
AST SERPL-CCNC: 19 U/L (ref 15–37)
B FRAGILIS DNA BLD POS QL NAA+NON-PROBE: NOT DETECTED
BACTERIA URNS QL MICRO: ABNORMAL /HPF
BASOPHILS # BLD: 0 K/UL (ref 0–0.1)
BASOPHILS NFR BLD: 0 % (ref 0–1)
BILIRUB SERPL-MCNC: 0.4 MG/DL (ref 0.2–1)
BILIRUB UR QL: NEGATIVE
BIOFIRE TEST COMMENT: ABNORMAL
BLACTX-M ISLT/SPM QL: DETECTED
BLAIMP ISLT/SPM QL: NOT DETECTED
BLAKPC ISLT/SPM QL: NOT DETECTED
BLAOXA-48-LIKE ISLT/SPM QL: NOT DETECTED
BLAVIM ISLT/SPM QL: NOT DETECTED
BUN SERPL-MCNC: 45 MG/DL (ref 6–20)
BUN/CREAT SERPL: 17 (ref 12–20)
C ALBICANS DNA BLD POS QL NAA+NON-PROBE: NOT DETECTED
C AURIS DNA BLD POS QL NAA+NON-PROBE: NOT DETECTED
C GATTII+NEOFOR DNA BLD POS QL NAA+N-PRB: NOT DETECTED
C GLABRATA DNA BLD POS QL NAA+NON-PROBE: NOT DETECTED
C KRUSEI DNA BLD POS QL NAA+NON-PROBE: NOT DETECTED
C PARAP DNA BLD POS QL NAA+NON-PROBE: NOT DETECTED
C TROPICLS DNA BLD POS QL NAA+NON-PROBE: NOT DETECTED
CALCIUM SERPL-MCNC: 9.4 MG/DL (ref 8.5–10.1)
CHLORIDE SERPL-SCNC: 109 MMOL/L (ref 97–108)
CO2 SERPL-SCNC: 19 MMOL/L (ref 21–32)
COLISTIN RES MCR-1 ISLT/SPM QL: NOT DETECTED
COLOR UR: ABNORMAL
COMMENT:: NORMAL
CREAT SERPL-MCNC: 2.59 MG/DL (ref 0.7–1.3)
DIFFERENTIAL METHOD BLD: ABNORMAL
E CLOAC COMP DNA BLD POS NAA+NON-PROBE: NOT DETECTED
E COLI DNA BLD POS QL NAA+NON-PROBE: DETECTED
E FAECALIS DNA BLD POS QL NAA+NON-PROBE: NOT DETECTED
E FAECIUM DNA BLD POS QL NAA+NON-PROBE: NOT DETECTED
EKG ATRIAL RATE: 81 BPM
EKG ATRIAL RATE: 88 BPM
EKG DIAGNOSIS: NORMAL
EKG DIAGNOSIS: NORMAL
EKG P AXIS: 109 DEGREES
EKG P AXIS: 37 DEGREES
EKG P-R INTERVAL: 158 MS
EKG P-R INTERVAL: 162 MS
EKG Q-T INTERVAL: 358 MS
EKG Q-T INTERVAL: 394 MS
EKG QRS DURATION: 112 MS
EKG QRS DURATION: 62 MS
EKG QTC CALCULATION (BAZETT): 433 MS
EKG QTC CALCULATION (BAZETT): 457 MS
EKG R AXIS: -16 DEGREES
EKG R AXIS: -24 DEGREES
EKG T AXIS: -15 DEGREES
EKG T AXIS: -2 DEGREES
EKG VENTRICULAR RATE: 81 BPM
EKG VENTRICULAR RATE: 88 BPM
ENTEROBACTERALES DNA BLD POS NAA+N-PRB: DETECTED
EOSINOPHIL # BLD: 0 K/UL (ref 0–0.4)
EOSINOPHIL NFR BLD: 0 % (ref 0–7)
EPITH CASTS URNS QL MICRO: ABNORMAL /LPF
ERYTHROCYTE [DISTWIDTH] IN BLOOD BY AUTOMATED COUNT: 15.2 % (ref 11.5–14.5)
EST. AVERAGE GLUCOSE BLD GHB EST-MCNC: 91 MG/DL
GLOBULIN SER CALC-MCNC: 3.8 G/DL (ref 2–4)
GLUCOSE SERPL-MCNC: 112 MG/DL (ref 65–100)
GLUCOSE UR STRIP.AUTO-MCNC: NEGATIVE MG/DL
GP B STREP DNA BLD POS QL NAA+NON-PROBE: NOT DETECTED
HAEM INFLU DNA BLD POS QL NAA+NON-PROBE: NOT DETECTED
HBA1C MFR BLD: 4.8 % (ref 4–5.6)
HCT VFR BLD AUTO: 30.5 % (ref 36.6–50.3)
HGB BLD-MCNC: 9.7 G/DL (ref 12.1–17)
HGB UR QL STRIP: ABNORMAL
IMM GRANULOCYTES # BLD AUTO: 0.1 K/UL (ref 0–0.04)
IMM GRANULOCYTES NFR BLD AUTO: 1 % (ref 0–0.5)
K OXYTOCA DNA BLD POS QL NAA+NON-PROBE: NOT DETECTED
KETONES UR QL STRIP.AUTO: NEGATIVE MG/DL
KLEBSIELLA SP DNA BLD POS QL NAA+NON-PRB: NOT DETECTED
KLEBSIELLA SP DNA BLD POS QL NAA+NON-PRB: NOT DETECTED
L MONOCYTOG DNA BLD POS QL NAA+NON-PROBE: NOT DETECTED
LACTATE SERPL-SCNC: 1.2 MMOL/L (ref 0.4–2)
LEUKOCYTE ESTERASE UR QL STRIP.AUTO: ABNORMAL
LIPASE SERPL-CCNC: 31 U/L (ref 13–75)
LYMPHOCYTES # BLD: 0.8 K/UL (ref 0.8–3.5)
LYMPHOCYTES NFR BLD: 5 % (ref 12–49)
MAGNESIUM SERPL-MCNC: 2.1 MG/DL (ref 1.6–2.4)
MCH RBC QN AUTO: 31.3 PG (ref 26–34)
MCHC RBC AUTO-ENTMCNC: 31.8 G/DL (ref 30–36.5)
MCV RBC AUTO: 98.4 FL (ref 80–99)
MONOCYTES # BLD: 1 K/UL (ref 0–1)
MONOCYTES NFR BLD: 6 % (ref 5–13)
N MEN DNA BLD POS QL NAA+NON-PROBE: NOT DETECTED
NEUTS SEG # BLD: 16.3 K/UL (ref 1.8–8)
NEUTS SEG NFR BLD: 88 % (ref 32–75)
NITRITE UR QL STRIP.AUTO: NEGATIVE
NRBC # BLD: 0 K/UL (ref 0–0.01)
NRBC BLD-RTO: 0 PER 100 WBC
P AERUGINOSA DNA BLD POS NAA+NON-PROBE: NOT DETECTED
PH UR STRIP: 5.5 (ref 5–8)
PHOSPHATE SERPL-MCNC: 3.5 MG/DL (ref 2.6–4.7)
PLATELET # BLD AUTO: 192 K/UL (ref 150–400)
PMV BLD AUTO: 10.6 FL (ref 8.9–12.9)
POTASSIUM SERPL-SCNC: 4.5 MMOL/L (ref 3.5–5.1)
PROT SERPL-MCNC: 6.2 G/DL (ref 6.4–8.2)
PROT UR STRIP-MCNC: 100 MG/DL
PROTEUS SP DNA BLD POS QL NAA+NON-PROBE: NOT DETECTED
RBC # BLD AUTO: 3.1 M/UL (ref 4.1–5.7)
RBC #/AREA URNS HPF: ABNORMAL /HPF (ref 0–5)
RESISTANT GENE NDM BY PCR: NOT DETECTED
RESISTANT GENE TARGETS: ABNORMAL
S AUREUS DNA BLD POS QL NAA+NON-PROBE: NOT DETECTED
S AUREUS+CONS DNA BLD POS NAA+NON-PROBE: NOT DETECTED
S EPIDERMIDIS DNA BLD POS QL NAA+NON-PRB: NOT DETECTED
S LUGDUNENSIS DNA BLD POS QL NAA+NON-PRB: NOT DETECTED
S MALTOPHILIA DNA BLD POS QL NAA+NON-PRB: NOT DETECTED
S MARCESCENS DNA BLD POS NAA+NON-PROBE: NOT DETECTED
S PNEUM DNA BLD POS QL NAA+NON-PROBE: NOT DETECTED
S PYO DNA BLD POS QL NAA+NON-PROBE: NOT DETECTED
SALMONELLA DNA BLD POS QL NAA+NON-PROBE: NOT DETECTED
SODIUM SERPL-SCNC: 136 MMOL/L (ref 136–145)
SP GR UR REFRACTOMETRY: 1.02 (ref 1–1.03)
SPECIMEN HOLD: NORMAL
STREPTOCOCCUS DNA BLD POS NAA+NON-PROBE: NOT DETECTED
TROPONIN I SERPL HS-MCNC: 764 NG/L (ref 0–76)
TROPONIN I SERPL HS-MCNC: 877 NG/L (ref 0–76)
TSH SERPL DL<=0.05 MIU/L-ACNC: 1.06 UIU/ML (ref 0.36–3.74)
UROBILINOGEN UR QL STRIP.AUTO: 0.2 EU/DL (ref 0.2–1)
WBC # BLD AUTO: 18.3 K/UL (ref 4.1–11.1)
WBC URNS QL MICRO: ABNORMAL /HPF (ref 0–4)

## 2024-09-20 PROCEDURE — 2580000003 HC RX 258: Performed by: INTERNAL MEDICINE

## 2024-09-20 PROCEDURE — 99223 1ST HOSP IP/OBS HIGH 75: CPT | Performed by: INTERNAL MEDICINE

## 2024-09-20 PROCEDURE — 74176 CT ABD & PELVIS W/O CONTRAST: CPT

## 2024-09-20 PROCEDURE — 93005 ELECTROCARDIOGRAM TRACING: CPT | Performed by: EMERGENCY MEDICINE

## 2024-09-20 PROCEDURE — 6360000002 HC RX W HCPCS: Performed by: INTERNAL MEDICINE

## 2024-09-20 PROCEDURE — 84443 ASSAY THYROID STIM HORMONE: CPT

## 2024-09-20 PROCEDURE — 6360000002 HC RX W HCPCS: Performed by: NURSE PRACTITIONER

## 2024-09-20 PROCEDURE — 6370000000 HC RX 637 (ALT 250 FOR IP): Performed by: INTERNAL MEDICINE

## 2024-09-20 PROCEDURE — 50435 EXCHANGE NEPHROSTOMY CATH: CPT

## 2024-09-20 PROCEDURE — 87077 CULTURE AEROBIC IDENTIFY: CPT

## 2024-09-20 PROCEDURE — 85025 COMPLETE CBC W/AUTO DIFF WBC: CPT

## 2024-09-20 PROCEDURE — 87086 URINE CULTURE/COLONY COUNT: CPT

## 2024-09-20 PROCEDURE — 2060000000 HC ICU INTERMEDIATE R&B

## 2024-09-20 PROCEDURE — 6360000002 HC RX W HCPCS

## 2024-09-20 PROCEDURE — 83690 ASSAY OF LIPASE: CPT

## 2024-09-20 PROCEDURE — 0T25X0Z CHANGE DRAINAGE DEVICE IN KIDNEY, EXTERNAL APPROACH: ICD-10-PCS | Performed by: STUDENT IN AN ORGANIZED HEALTH CARE EDUCATION/TRAINING PROGRAM

## 2024-09-20 PROCEDURE — 84484 ASSAY OF TROPONIN QUANT: CPT

## 2024-09-20 PROCEDURE — 83036 HEMOGLOBIN GLYCOSYLATED A1C: CPT

## 2024-09-20 PROCEDURE — 80053 COMPREHEN METABOLIC PANEL: CPT

## 2024-09-20 PROCEDURE — 2580000003 HC RX 258

## 2024-09-20 PROCEDURE — 2580000003 HC RX 258: Performed by: NURSE PRACTITIONER

## 2024-09-20 PROCEDURE — 87186 SC STD MICRODIL/AGAR DIL: CPT

## 2024-09-20 PROCEDURE — 2500000003 HC RX 250 WO HCPCS: Performed by: STUDENT IN AN ORGANIZED HEALTH CARE EDUCATION/TRAINING PROGRAM

## 2024-09-20 PROCEDURE — 84100 ASSAY OF PHOSPHORUS: CPT

## 2024-09-20 PROCEDURE — 71045 X-RAY EXAM CHEST 1 VIEW: CPT

## 2024-09-20 PROCEDURE — 6360000002 HC RX W HCPCS: Performed by: STUDENT IN AN ORGANIZED HEALTH CARE EDUCATION/TRAINING PROGRAM

## 2024-09-20 PROCEDURE — 83735 ASSAY OF MAGNESIUM: CPT

## 2024-09-20 RX ORDER — ATORVASTATIN CALCIUM 40 MG/1
40 TABLET, FILM COATED ORAL NIGHTLY
Status: DISCONTINUED | OUTPATIENT
Start: 2024-09-20 | End: 2024-09-26 | Stop reason: HOSPADM

## 2024-09-20 RX ORDER — ACETAMINOPHEN 650 MG/1
650 SUPPOSITORY RECTAL EVERY 6 HOURS PRN
Status: DISCONTINUED | OUTPATIENT
Start: 2024-09-20 | End: 2024-09-26 | Stop reason: HOSPADM

## 2024-09-20 RX ORDER — IBUPROFEN 200 MG
1 CAPSULE ORAL DAILY
COMMUNITY

## 2024-09-20 RX ORDER — MAG HYDROX/ALUMINUM HYD/SIMETH 400-400-40
320 SUSPENSION, ORAL (FINAL DOSE FORM) ORAL DAILY
COMMUNITY

## 2024-09-20 RX ORDER — HYDROMORPHONE HYDROCHLORIDE 1 MG/ML
1 INJECTION, SOLUTION INTRAMUSCULAR; INTRAVENOUS; SUBCUTANEOUS EVERY 4 HOURS PRN
Status: DISCONTINUED | OUTPATIENT
Start: 2024-09-20 | End: 2024-09-26 | Stop reason: HOSPADM

## 2024-09-20 RX ORDER — SODIUM CHLORIDE 0.9 % (FLUSH) 0.9 %
5-40 SYRINGE (ML) INJECTION PRN
Status: DISCONTINUED | OUTPATIENT
Start: 2024-09-20 | End: 2024-09-26 | Stop reason: HOSPADM

## 2024-09-20 RX ORDER — SODIUM CHLORIDE 0.9 % (FLUSH) 0.9 %
5-40 SYRINGE (ML) INJECTION EVERY 12 HOURS SCHEDULED
Status: DISCONTINUED | OUTPATIENT
Start: 2024-09-20 | End: 2024-09-26 | Stop reason: HOSPADM

## 2024-09-20 RX ORDER — CLOPIDOGREL BISULFATE 75 MG/1
75 TABLET ORAL DAILY
Status: DISCONTINUED | OUTPATIENT
Start: 2024-09-20 | End: 2024-09-20

## 2024-09-20 RX ORDER — ACETAMINOPHEN 325 MG/1
650 TABLET ORAL EVERY 6 HOURS PRN
Status: DISCONTINUED | OUTPATIENT
Start: 2024-09-20 | End: 2024-09-26 | Stop reason: HOSPADM

## 2024-09-20 RX ORDER — LIDOCAINE HYDROCHLORIDE 10 MG/ML
INJECTION, SOLUTION EPIDURAL; INFILTRATION; INTRACAUDAL; PERINEURAL PRN
Status: COMPLETED | OUTPATIENT
Start: 2024-09-20 | End: 2024-09-20

## 2024-09-20 RX ORDER — ACETAMINOPHEN 500 MG
500 TABLET ORAL EVERY 6 HOURS PRN
COMMUNITY

## 2024-09-20 RX ORDER — SACCHAROMYCES BOULARDII 250 MG
500 CAPSULE ORAL DAILY PRN
COMMUNITY

## 2024-09-20 RX ORDER — SODIUM CHLORIDE 9 MG/ML
INJECTION, SOLUTION INTRAVENOUS CONTINUOUS
Status: DISCONTINUED | OUTPATIENT
Start: 2024-09-20 | End: 2024-09-26 | Stop reason: HOSPADM

## 2024-09-20 RX ORDER — ONDANSETRON 2 MG/ML
4 INJECTION INTRAMUSCULAR; INTRAVENOUS EVERY 6 HOURS PRN
Status: DISCONTINUED | OUTPATIENT
Start: 2024-09-20 | End: 2024-09-26 | Stop reason: HOSPADM

## 2024-09-20 RX ORDER — ENOXAPARIN SODIUM 100 MG/ML
30 INJECTION SUBCUTANEOUS DAILY
Status: DISCONTINUED | OUTPATIENT
Start: 2024-09-20 | End: 2024-09-23

## 2024-09-20 RX ORDER — 0.9 % SODIUM CHLORIDE 0.9 %
30 INTRAVENOUS SOLUTION INTRAVENOUS ONCE
Status: COMPLETED | OUTPATIENT
Start: 2024-09-20 | End: 2024-09-20

## 2024-09-20 RX ORDER — SODIUM CHLORIDE 9 MG/ML
INJECTION, SOLUTION INTRAVENOUS PRN
Status: DISCONTINUED | OUTPATIENT
Start: 2024-09-20 | End: 2024-09-26 | Stop reason: HOSPADM

## 2024-09-20 RX ORDER — OXYCODONE HYDROCHLORIDE 5 MG/1
5 TABLET ORAL EVERY 4 HOURS PRN
Status: DISCONTINUED | OUTPATIENT
Start: 2024-09-20 | End: 2024-09-26 | Stop reason: HOSPADM

## 2024-09-20 RX ORDER — ASPIRIN 81 MG/1
81 TABLET ORAL DAILY
Status: DISCONTINUED | OUTPATIENT
Start: 2024-09-20 | End: 2024-09-26 | Stop reason: HOSPADM

## 2024-09-20 RX ORDER — FENTANYL CITRATE 50 UG/ML
INJECTION, SOLUTION INTRAMUSCULAR; INTRAVENOUS PRN
Status: COMPLETED | OUTPATIENT
Start: 2024-09-20 | End: 2024-09-20

## 2024-09-20 RX ORDER — M-VIT,TX,IRON,MINS/CALC/FOLIC 27MG-0.4MG
1 TABLET ORAL DAILY
COMMUNITY

## 2024-09-20 RX ADMIN — ASPIRIN 81 MG: 81 TABLET, COATED ORAL at 09:21

## 2024-09-20 RX ADMIN — ONDANSETRON 4 MG: 2 INJECTION INTRAMUSCULAR; INTRAVENOUS at 12:17

## 2024-09-20 RX ADMIN — PANTOPRAZOLE SODIUM 40 MG: 40 INJECTION, POWDER, FOR SOLUTION INTRAVENOUS at 09:22

## 2024-09-20 RX ADMIN — FENTANYL CITRATE 25 MCG: 50 INJECTION, SOLUTION INTRAMUSCULAR; INTRAVENOUS at 15:24

## 2024-09-20 RX ADMIN — SODIUM CHLORIDE 2100 ML: 9 INJECTION, SOLUTION INTRAVENOUS at 04:40

## 2024-09-20 RX ADMIN — SODIUM CHLORIDE, PRESERVATIVE FREE 10 ML: 5 INJECTION INTRAVENOUS at 09:28

## 2024-09-20 RX ADMIN — CLOPIDOGREL BISULFATE 75 MG: 75 TABLET ORAL at 09:21

## 2024-09-20 RX ADMIN — SODIUM CHLORIDE, PRESERVATIVE FREE 10 ML: 5 INJECTION INTRAVENOUS at 21:44

## 2024-09-20 RX ADMIN — PIPERACILLIN AND TAZOBACTAM 4500 MG: 4; .5 INJECTION, POWDER, LYOPHILIZED, FOR SOLUTION INTRAVENOUS at 09:27

## 2024-09-20 RX ADMIN — LIDOCAINE HYDROCHLORIDE 8 ML: 10 INJECTION, SOLUTION EPIDURAL; INFILTRATION; INTRACAUDAL; PERINEURAL at 15:21

## 2024-09-20 RX ADMIN — ATORVASTATIN CALCIUM 40 MG: 40 TABLET, FILM COATED ORAL at 21:41

## 2024-09-20 RX ADMIN — SODIUM CHLORIDE 1000 MG: 9 INJECTION INTRAMUSCULAR; INTRAVENOUS; SUBCUTANEOUS at 21:41

## 2024-09-20 RX ADMIN — ENOXAPARIN SODIUM 30 MG: 100 INJECTION SUBCUTANEOUS at 09:22

## 2024-09-20 RX ADMIN — ONDANSETRON 4 MG: 2 INJECTION INTRAMUSCULAR; INTRAVENOUS at 18:05

## 2024-09-20 RX ADMIN — PIPERACILLIN AND TAZOBACTAM 3375 MG: 3; .375 INJECTION, POWDER, LYOPHILIZED, FOR SOLUTION INTRAVENOUS at 17:05

## 2024-09-20 RX ADMIN — SODIUM CHLORIDE: 9 INJECTION, SOLUTION INTRAVENOUS at 17:04

## 2024-09-20 ASSESSMENT — PAIN - FUNCTIONAL ASSESSMENT
PAIN_FUNCTIONAL_ASSESSMENT: NONE - DENIES PAIN

## 2024-09-21 LAB
ANION GAP SERPL CALC-SCNC: 8 MMOL/L (ref 2–12)
BASOPHILS # BLD: 0 K/UL (ref 0–0.1)
BASOPHILS NFR BLD: 0 % (ref 0–1)
BUN SERPL-MCNC: 38 MG/DL (ref 6–20)
BUN/CREAT SERPL: 19 (ref 12–20)
CALCIUM SERPL-MCNC: 8.5 MG/DL (ref 8.5–10.1)
CHLORIDE SERPL-SCNC: 114 MMOL/L (ref 97–108)
CO2 SERPL-SCNC: 17 MMOL/L (ref 21–32)
CREAT SERPL-MCNC: 2.01 MG/DL (ref 0.7–1.3)
DIFFERENTIAL METHOD BLD: ABNORMAL
EOSINOPHIL # BLD: 0.1 K/UL (ref 0–0.4)
EOSINOPHIL NFR BLD: 1 % (ref 0–7)
ERYTHROCYTE [DISTWIDTH] IN BLOOD BY AUTOMATED COUNT: 15.3 % (ref 11.5–14.5)
GLUCOSE SERPL-MCNC: 90 MG/DL (ref 65–100)
HCT VFR BLD AUTO: 24.9 % (ref 36.6–50.3)
HGB BLD-MCNC: 8.2 G/DL (ref 12.1–17)
IMM GRANULOCYTES # BLD AUTO: 0.1 K/UL (ref 0–0.04)
IMM GRANULOCYTES NFR BLD AUTO: 1 % (ref 0–0.5)
LYMPHOCYTES # BLD: 0.9 K/UL (ref 0.8–3.5)
LYMPHOCYTES NFR BLD: 8 % (ref 12–49)
MCH RBC QN AUTO: 31.7 PG (ref 26–34)
MCHC RBC AUTO-ENTMCNC: 32.9 G/DL (ref 30–36.5)
MCV RBC AUTO: 96.1 FL (ref 80–99)
MONOCYTES # BLD: 0.6 K/UL (ref 0–1)
MONOCYTES NFR BLD: 6 % (ref 5–13)
NEUTS SEG # BLD: 9.8 K/UL (ref 1.8–8)
NEUTS SEG NFR BLD: 84 % (ref 32–75)
NRBC # BLD: 0 K/UL (ref 0–0.01)
NRBC BLD-RTO: 0 PER 100 WBC
PLATELET # BLD AUTO: 178 K/UL (ref 150–400)
PMV BLD AUTO: 10.1 FL (ref 8.9–12.9)
POTASSIUM SERPL-SCNC: 4.4 MMOL/L (ref 3.5–5.1)
RBC # BLD AUTO: 2.59 M/UL (ref 4.1–5.7)
SODIUM SERPL-SCNC: 139 MMOL/L (ref 136–145)
WBC # BLD AUTO: 11.5 K/UL (ref 4.1–11.1)

## 2024-09-21 PROCEDURE — 6360000002 HC RX W HCPCS: Performed by: INTERNAL MEDICINE

## 2024-09-21 PROCEDURE — 97116 GAIT TRAINING THERAPY: CPT

## 2024-09-21 PROCEDURE — 2580000003 HC RX 258: Performed by: NURSE PRACTITIONER

## 2024-09-21 PROCEDURE — 2060000000 HC ICU INTERMEDIATE R&B

## 2024-09-21 PROCEDURE — 97165 OT EVAL LOW COMPLEX 30 MIN: CPT

## 2024-09-21 PROCEDURE — 97530 THERAPEUTIC ACTIVITIES: CPT

## 2024-09-21 PROCEDURE — 2580000003 HC RX 258: Performed by: INTERNAL MEDICINE

## 2024-09-21 PROCEDURE — 80048 BASIC METABOLIC PNL TOTAL CA: CPT

## 2024-09-21 PROCEDURE — 85025 COMPLETE CBC W/AUTO DIFF WBC: CPT

## 2024-09-21 PROCEDURE — 94760 N-INVAS EAR/PLS OXIMETRY 1: CPT

## 2024-09-21 PROCEDURE — 6370000000 HC RX 637 (ALT 250 FOR IP): Performed by: INTERNAL MEDICINE

## 2024-09-21 PROCEDURE — 36415 COLL VENOUS BLD VENIPUNCTURE: CPT

## 2024-09-21 PROCEDURE — 83605 ASSAY OF LACTIC ACID: CPT

## 2024-09-21 PROCEDURE — 87040 BLOOD CULTURE FOR BACTERIA: CPT

## 2024-09-21 PROCEDURE — 97161 PT EVAL LOW COMPLEX 20 MIN: CPT

## 2024-09-21 PROCEDURE — 6360000002 HC RX W HCPCS: Performed by: NURSE PRACTITIONER

## 2024-09-21 RX ORDER — 0.9 % SODIUM CHLORIDE 0.9 %
500 INTRAVENOUS SOLUTION INTRAVENOUS ONCE
Status: COMPLETED | OUTPATIENT
Start: 2024-09-21 | End: 2024-09-21

## 2024-09-21 RX ADMIN — SODIUM CHLORIDE 500 ML: 9 INJECTION, SOLUTION INTRAVENOUS at 12:50

## 2024-09-21 RX ADMIN — ATORVASTATIN CALCIUM 40 MG: 40 TABLET, FILM COATED ORAL at 20:39

## 2024-09-21 RX ADMIN — SODIUM CHLORIDE, PRESERVATIVE FREE 10 ML: 5 INJECTION INTRAVENOUS at 20:39

## 2024-09-21 RX ADMIN — SODIUM CHLORIDE: 9 INJECTION, SOLUTION INTRAVENOUS at 15:16

## 2024-09-21 RX ADMIN — MEROPENEM 500 MG: 500 INJECTION, POWDER, FOR SOLUTION INTRAVENOUS at 20:45

## 2024-09-21 RX ADMIN — PANTOPRAZOLE SODIUM 40 MG: 40 INJECTION, POWDER, FOR SOLUTION INTRAVENOUS at 09:16

## 2024-09-21 RX ADMIN — SODIUM CHLORIDE 500 ML: 9 INJECTION, SOLUTION INTRAVENOUS at 09:16

## 2024-09-21 RX ADMIN — ENOXAPARIN SODIUM 30 MG: 100 INJECTION SUBCUTANEOUS at 09:17

## 2024-09-21 RX ADMIN — ASPIRIN 81 MG: 81 TABLET, COATED ORAL at 09:17

## 2024-09-21 RX ADMIN — MEROPENEM 500 MG: 500 INJECTION, POWDER, FOR SOLUTION INTRAVENOUS at 09:17

## 2024-09-21 ASSESSMENT — PAIN SCALES - GENERAL
PAINLEVEL_OUTOF10: 0

## 2024-09-22 LAB
ANION GAP SERPL CALC-SCNC: 6 MMOL/L (ref 2–12)
BACTERIA SPEC CULT: ABNORMAL
BUN SERPL-MCNC: 35 MG/DL (ref 6–20)
CALCIUM SERPL-MCNC: 8.7 MG/DL (ref 8.5–10.1)
CC UR VC: ABNORMAL
CC UR VC: ABNORMAL
CHLORIDE SERPL-SCNC: 116 MMOL/L (ref 97–108)
CO2 SERPL-SCNC: 20 MMOL/L (ref 21–32)
CREAT SERPL-MCNC: 1.71 MG/DL (ref 0.7–1.3)
ERYTHROCYTE [DISTWIDTH] IN BLOOD BY AUTOMATED COUNT: 15.2 % (ref 11.5–14.5)
GLUCOSE SERPL-MCNC: 82 MG/DL (ref 65–100)
HCT VFR BLD AUTO: 27.1 % (ref 36.6–50.3)
HGB BLD-MCNC: 8.5 G/DL (ref 12.1–17)
MCH RBC QN AUTO: 30.9 PG (ref 26–34)
MCHC RBC AUTO-ENTMCNC: 31.4 G/DL (ref 30–36.5)
MCV RBC AUTO: 98.5 FL (ref 80–99)
NRBC # BLD: 0 K/UL (ref 0–0.01)
NRBC BLD-RTO: 0 PER 100 WBC
PLATELET # BLD AUTO: 213 K/UL (ref 150–400)
PMV BLD AUTO: 10.5 FL (ref 8.9–12.9)
RBC # BLD AUTO: 2.75 M/UL (ref 4.1–5.7)
SERVICE CMNT-IMP: ABNORMAL
SODIUM SERPL-SCNC: 142 MMOL/L (ref 136–145)
WBC # BLD AUTO: 9.3 K/UL (ref 4.1–11.1)

## 2024-09-22 PROCEDURE — 2580000003 HC RX 258: Performed by: INTERNAL MEDICINE

## 2024-09-22 PROCEDURE — 94760 N-INVAS EAR/PLS OXIMETRY 1: CPT

## 2024-09-22 PROCEDURE — 85027 COMPLETE CBC AUTOMATED: CPT

## 2024-09-22 PROCEDURE — 6370000000 HC RX 637 (ALT 250 FOR IP): Performed by: INTERNAL MEDICINE

## 2024-09-22 PROCEDURE — 6360000002 HC RX W HCPCS: Performed by: INTERNAL MEDICINE

## 2024-09-22 PROCEDURE — 2060000000 HC ICU INTERMEDIATE R&B

## 2024-09-22 PROCEDURE — 2580000003 HC RX 258: Performed by: NURSE PRACTITIONER

## 2024-09-22 PROCEDURE — 6360000002 HC RX W HCPCS: Performed by: NURSE PRACTITIONER

## 2024-09-22 PROCEDURE — 36415 COLL VENOUS BLD VENIPUNCTURE: CPT

## 2024-09-22 PROCEDURE — 80048 BASIC METABOLIC PNL TOTAL CA: CPT

## 2024-09-22 PROCEDURE — 2700000000 HC OXYGEN THERAPY PER DAY

## 2024-09-22 RX ORDER — MIRTAZAPINE 15 MG/1
15 TABLET, FILM COATED ORAL EVERY EVENING
Status: DISCONTINUED | OUTPATIENT
Start: 2024-09-22 | End: 2024-09-26 | Stop reason: HOSPADM

## 2024-09-22 RX ORDER — MIRTAZAPINE 15 MG/1
15 TABLET, FILM COATED ORAL NIGHTLY
Status: DISCONTINUED | OUTPATIENT
Start: 2024-09-22 | End: 2024-09-22

## 2024-09-22 RX ADMIN — PANTOPRAZOLE SODIUM 40 MG: 40 INJECTION, POWDER, FOR SOLUTION INTRAVENOUS at 08:45

## 2024-09-22 RX ADMIN — SODIUM CHLORIDE, PRESERVATIVE FREE 10 ML: 5 INJECTION INTRAVENOUS at 20:41

## 2024-09-22 RX ADMIN — SODIUM CHLORIDE: 9 INJECTION, SOLUTION INTRAVENOUS at 07:11

## 2024-09-22 RX ADMIN — ASPIRIN 81 MG: 81 TABLET, COATED ORAL at 08:45

## 2024-09-22 RX ADMIN — MEROPENEM 500 MG: 500 INJECTION, POWDER, FOR SOLUTION INTRAVENOUS at 08:47

## 2024-09-22 RX ADMIN — MIRTAZAPINE 15 MG: 15 TABLET, FILM COATED ORAL at 18:29

## 2024-09-22 RX ADMIN — MEROPENEM 1000 MG: 1 INJECTION INTRAVENOUS at 20:41

## 2024-09-22 RX ADMIN — ATORVASTATIN CALCIUM 40 MG: 40 TABLET, FILM COATED ORAL at 20:41

## 2024-09-22 RX ADMIN — ENOXAPARIN SODIUM 30 MG: 100 INJECTION SUBCUTANEOUS at 08:45

## 2024-09-22 ASSESSMENT — PAIN SCALES - GENERAL
PAINLEVEL_OUTOF10: 0

## 2024-09-23 PROBLEM — A49.9 ESBL (EXTENDED SPECTRUM BETA-LACTAMASE) PRODUCING BACTERIA INFECTION: Status: ACTIVE | Noted: 2024-09-23

## 2024-09-23 PROBLEM — Z16.12 ESBL (EXTENDED SPECTRUM BETA-LACTAMASE) PRODUCING BACTERIA INFECTION: Status: ACTIVE | Noted: 2024-09-23

## 2024-09-23 PROBLEM — R78.81 BACTEREMIA DUE TO GRAM-NEGATIVE BACTERIA: Status: ACTIVE | Noted: 2024-09-23

## 2024-09-23 LAB
ALBUMIN SERPL-MCNC: 1.9 G/DL (ref 3.5–5)
ANION GAP SERPL CALC-SCNC: 7 MMOL/L (ref 2–12)
BUN SERPL-MCNC: 25 MG/DL (ref 6–20)
BUN/CREAT SERPL: 19 (ref 12–20)
CALCIUM SERPL-MCNC: 8.5 MG/DL (ref 8.5–10.1)
CHLORIDE SERPL-SCNC: 116 MMOL/L (ref 97–108)
CO2 SERPL-SCNC: 20 MMOL/L (ref 21–32)
CREAT SERPL-MCNC: 1.3 MG/DL (ref 0.7–1.3)
ERYTHROCYTE [DISTWIDTH] IN BLOOD BY AUTOMATED COUNT: 15 % (ref 11.5–14.5)
GLUCOSE SERPL-MCNC: 85 MG/DL (ref 65–100)
HCT VFR BLD AUTO: 28.1 % (ref 36.6–50.3)
HGB BLD-MCNC: 9 G/DL (ref 12.1–17)
MCHC RBC AUTO-ENTMCNC: 32 G/DL (ref 30–36.5)
MCV RBC AUTO: 98.9 FL (ref 80–99)
NRBC # BLD: 0 K/UL (ref 0–0.01)
NRBC BLD-RTO: 0 PER 100 WBC
PLATELET # BLD AUTO: 237 K/UL (ref 150–400)
PMV BLD AUTO: 9.7 FL (ref 8.9–12.9)
POTASSIUM SERPL-SCNC: 3.9 MMOL/L (ref 3.5–5.1)
RBC # BLD AUTO: 2.84 M/UL (ref 4.1–5.7)
SODIUM SERPL-SCNC: 143 MMOL/L (ref 136–145)
WBC # BLD AUTO: 14.1 K/UL (ref 4.1–11.1)

## 2024-09-23 PROCEDURE — 2580000003 HC RX 258: Performed by: INTERNAL MEDICINE

## 2024-09-23 PROCEDURE — 85027 COMPLETE CBC AUTOMATED: CPT

## 2024-09-23 PROCEDURE — 99222 1ST HOSP IP/OBS MODERATE 55: CPT | Performed by: INTERNAL MEDICINE

## 2024-09-23 PROCEDURE — 6360000002 HC RX W HCPCS: Performed by: INTERNAL MEDICINE

## 2024-09-23 PROCEDURE — 6370000000 HC RX 637 (ALT 250 FOR IP): Performed by: INTERNAL MEDICINE

## 2024-09-23 PROCEDURE — 2060000000 HC ICU INTERMEDIATE R&B

## 2024-09-23 PROCEDURE — 36415 COLL VENOUS BLD VENIPUNCTURE: CPT

## 2024-09-23 PROCEDURE — 80069 RENAL FUNCTION PANEL: CPT

## 2024-09-23 PROCEDURE — APPNB60 APP NON BILLABLE TIME 46-60 MINS: Performed by: NURSE PRACTITIONER

## 2024-09-23 PROCEDURE — 94760 N-INVAS EAR/PLS OXIMETRY 1: CPT

## 2024-09-23 PROCEDURE — 2700000000 HC OXYGEN THERAPY PER DAY

## 2024-09-23 PROCEDURE — 6370000000 HC RX 637 (ALT 250 FOR IP): Performed by: HOSPITALIST

## 2024-09-23 RX ORDER — ENOXAPARIN SODIUM 100 MG/ML
40 INJECTION SUBCUTANEOUS DAILY
Status: DISCONTINUED | OUTPATIENT
Start: 2024-09-24 | End: 2024-09-26 | Stop reason: HOSPADM

## 2024-09-23 RX ORDER — TRAZODONE HYDROCHLORIDE 50 MG/1
25 TABLET, FILM COATED ORAL NIGHTLY PRN
Status: DISCONTINUED | OUTPATIENT
Start: 2024-09-23 | End: 2024-09-26 | Stop reason: HOSPADM

## 2024-09-23 RX ORDER — TRAZODONE HYDROCHLORIDE 50 MG/1
50 TABLET, FILM COATED ORAL NIGHTLY PRN
Status: DISCONTINUED | OUTPATIENT
Start: 2024-09-23 | End: 2024-09-23

## 2024-09-23 RX ADMIN — MEROPENEM 1000 MG: 1 INJECTION INTRAVENOUS at 20:38

## 2024-09-23 RX ADMIN — SODIUM CHLORIDE: 9 INJECTION, SOLUTION INTRAVENOUS at 00:12

## 2024-09-23 RX ADMIN — SODIUM CHLORIDE, PRESERVATIVE FREE 10 ML: 5 INJECTION INTRAVENOUS at 09:26

## 2024-09-23 RX ADMIN — ATORVASTATIN CALCIUM 40 MG: 40 TABLET, FILM COATED ORAL at 20:35

## 2024-09-23 RX ADMIN — MIRTAZAPINE 15 MG: 15 TABLET, FILM COATED ORAL at 17:53

## 2024-09-23 RX ADMIN — ENOXAPARIN SODIUM 30 MG: 100 INJECTION SUBCUTANEOUS at 09:26

## 2024-09-23 RX ADMIN — MEROPENEM 1000 MG: 1 INJECTION INTRAVENOUS at 09:29

## 2024-09-23 RX ADMIN — PANTOPRAZOLE SODIUM 40 MG: 40 INJECTION, POWDER, FOR SOLUTION INTRAVENOUS at 09:26

## 2024-09-23 RX ADMIN — TRAZODONE HYDROCHLORIDE 25 MG: 50 TABLET ORAL at 20:35

## 2024-09-23 RX ADMIN — SODIUM CHLORIDE: 9 INJECTION, SOLUTION INTRAVENOUS at 17:55

## 2024-09-23 RX ADMIN — ASPIRIN 81 MG: 81 TABLET, COATED ORAL at 09:26

## 2024-09-23 ASSESSMENT — PAIN SCALES - GENERAL: PAINLEVEL_OUTOF10: 0

## 2024-09-24 LAB
GLUCOSE BLD STRIP.AUTO-MCNC: 114 MG/DL (ref 65–117)
SERVICE CMNT-IMP: NORMAL

## 2024-09-24 PROCEDURE — 6360000002 HC RX W HCPCS: Performed by: INTERNAL MEDICINE

## 2024-09-24 PROCEDURE — 97116 GAIT TRAINING THERAPY: CPT

## 2024-09-24 PROCEDURE — 2580000003 HC RX 258: Performed by: INTERNAL MEDICINE

## 2024-09-24 PROCEDURE — 97530 THERAPEUTIC ACTIVITIES: CPT

## 2024-09-24 PROCEDURE — 2060000000 HC ICU INTERMEDIATE R&B

## 2024-09-24 PROCEDURE — 94760 N-INVAS EAR/PLS OXIMETRY 1: CPT

## 2024-09-24 PROCEDURE — 2700000000 HC OXYGEN THERAPY PER DAY

## 2024-09-24 PROCEDURE — 82962 GLUCOSE BLOOD TEST: CPT

## 2024-09-24 PROCEDURE — 6370000000 HC RX 637 (ALT 250 FOR IP): Performed by: INTERNAL MEDICINE

## 2024-09-24 PROCEDURE — 6360000002 HC RX W HCPCS: Performed by: HOSPITALIST

## 2024-09-24 PROCEDURE — 97535 SELF CARE MNGMENT TRAINING: CPT

## 2024-09-24 RX ADMIN — MEROPENEM 1000 MG: 1 INJECTION INTRAVENOUS at 08:49

## 2024-09-24 RX ADMIN — MIRTAZAPINE 15 MG: 15 TABLET, FILM COATED ORAL at 17:08

## 2024-09-24 RX ADMIN — SODIUM CHLORIDE, PRESERVATIVE FREE 10 ML: 5 INJECTION INTRAVENOUS at 20:38

## 2024-09-24 RX ADMIN — PANTOPRAZOLE SODIUM 40 MG: 40 INJECTION, POWDER, FOR SOLUTION INTRAVENOUS at 08:48

## 2024-09-24 RX ADMIN — SODIUM CHLORIDE: 9 INJECTION, SOLUTION INTRAVENOUS at 11:57

## 2024-09-24 RX ADMIN — ATORVASTATIN CALCIUM 40 MG: 40 TABLET, FILM COATED ORAL at 20:37

## 2024-09-24 RX ADMIN — ENOXAPARIN SODIUM 40 MG: 100 INJECTION SUBCUTANEOUS at 08:48

## 2024-09-24 RX ADMIN — ASPIRIN 81 MG: 81 TABLET, COATED ORAL at 08:48

## 2024-09-24 RX ADMIN — MEROPENEM 1000 MG: 1 INJECTION INTRAVENOUS at 20:39

## 2024-09-25 LAB
ANION GAP SERPL CALC-SCNC: 7 MMOL/L (ref 2–12)
BASOPHILS # BLD: 0.1 K/UL (ref 0–0.1)
BASOPHILS NFR BLD: 0 % (ref 0–1)
BUN SERPL-MCNC: 22 MG/DL (ref 6–20)
BUN/CREAT SERPL: 17 (ref 12–20)
CALCIUM SERPL-MCNC: 8.6 MG/DL (ref 8.5–10.1)
CHLORIDE SERPL-SCNC: 117 MMOL/L (ref 97–108)
CO2 SERPL-SCNC: 20 MMOL/L (ref 21–32)
CREAT SERPL-MCNC: 1.33 MG/DL (ref 0.7–1.3)
DIFFERENTIAL METHOD BLD: ABNORMAL
EOSINOPHIL # BLD: 0.4 K/UL (ref 0–0.4)
EOSINOPHIL NFR BLD: 3 % (ref 0–7)
ERYTHROCYTE [DISTWIDTH] IN BLOOD BY AUTOMATED COUNT: 15.1 % (ref 11.5–14.5)
GLUCOSE SERPL-MCNC: 96 MG/DL (ref 65–100)
HCT VFR BLD AUTO: 25.4 % (ref 36.6–50.3)
HGB BLD-MCNC: 8.2 G/DL (ref 12.1–17)
IMM GRANULOCYTES # BLD AUTO: 0.2 K/UL (ref 0–0.04)
IMM GRANULOCYTES NFR BLD AUTO: 1 % (ref 0–0.5)
LYMPHOCYTES # BLD: 1.3 K/UL (ref 0.8–3.5)
LYMPHOCYTES NFR BLD: 11 % (ref 12–49)
MCH RBC QN AUTO: 31.2 PG (ref 26–34)
MCHC RBC AUTO-ENTMCNC: 32.3 G/DL (ref 30–36.5)
MCV RBC AUTO: 96.6 FL (ref 80–99)
MONOCYTES # BLD: 0.7 K/UL (ref 0–1)
MONOCYTES NFR BLD: 5 % (ref 5–13)
NEUTS SEG # BLD: 10 K/UL (ref 1.8–8)
NEUTS SEG NFR BLD: 80 % (ref 32–75)
NRBC # BLD: 0 K/UL (ref 0–0.01)
NRBC BLD-RTO: 0 PER 100 WBC
PLATELET # BLD AUTO: 259 K/UL (ref 150–400)
PMV BLD AUTO: 10.1 FL (ref 8.9–12.9)
POTASSIUM SERPL-SCNC: 3.6 MMOL/L (ref 3.5–5.1)
RBC # BLD AUTO: 2.63 M/UL (ref 4.1–5.7)
SODIUM SERPL-SCNC: 144 MMOL/L (ref 136–145)
WBC # BLD AUTO: 12.6 K/UL (ref 4.1–11.1)

## 2024-09-25 PROCEDURE — C1751 CATH, INF, PER/CENT/MIDLINE: HCPCS

## 2024-09-25 PROCEDURE — 36415 COLL VENOUS BLD VENIPUNCTURE: CPT

## 2024-09-25 PROCEDURE — 1200000000 HC SEMI PRIVATE

## 2024-09-25 PROCEDURE — 2580000003 HC RX 258: Performed by: INTERNAL MEDICINE

## 2024-09-25 PROCEDURE — 6370000000 HC RX 637 (ALT 250 FOR IP): Performed by: INTERNAL MEDICINE

## 2024-09-25 PROCEDURE — 2709999900 HC NON-CHARGEABLE SUPPLY

## 2024-09-25 PROCEDURE — 80048 BASIC METABOLIC PNL TOTAL CA: CPT

## 2024-09-25 PROCEDURE — 97116 GAIT TRAINING THERAPY: CPT

## 2024-09-25 PROCEDURE — 2580000003 HC RX 258: Performed by: HOSPITALIST

## 2024-09-25 PROCEDURE — 97530 THERAPEUTIC ACTIVITIES: CPT

## 2024-09-25 PROCEDURE — 6360000002 HC RX W HCPCS: Performed by: INTERNAL MEDICINE

## 2024-09-25 PROCEDURE — 85025 COMPLETE CBC W/AUTO DIFF WBC: CPT

## 2024-09-25 PROCEDURE — 76937 US GUIDE VASCULAR ACCESS: CPT

## 2024-09-25 PROCEDURE — 05HC33Z INSERTION OF INFUSION DEVICE INTO LEFT BASILIC VEIN, PERCUTANEOUS APPROACH: ICD-10-PCS | Performed by: HOSPITALIST

## 2024-09-25 PROCEDURE — 36410 VNPNXR 3YR/> PHY/QHP DX/THER: CPT

## 2024-09-25 PROCEDURE — 6360000002 HC RX W HCPCS: Performed by: HOSPITALIST

## 2024-09-25 RX ORDER — PANTOPRAZOLE SODIUM 40 MG/1
40 TABLET, DELAYED RELEASE ORAL
Status: DISCONTINUED | OUTPATIENT
Start: 2024-09-26 | End: 2024-09-26 | Stop reason: HOSPADM

## 2024-09-25 RX ORDER — SODIUM CHLORIDE 0.9 % (FLUSH) 0.9 %
5-40 SYRINGE (ML) INJECTION EVERY 12 HOURS SCHEDULED
Status: DISCONTINUED | OUTPATIENT
Start: 2024-09-25 | End: 2024-09-26 | Stop reason: HOSPADM

## 2024-09-25 RX ORDER — SODIUM CHLORIDE 0.9 % (FLUSH) 0.9 %
5-40 SYRINGE (ML) INJECTION PRN
Status: DISCONTINUED | OUTPATIENT
Start: 2024-09-25 | End: 2024-09-26 | Stop reason: HOSPADM

## 2024-09-25 RX ORDER — SODIUM CHLORIDE 9 MG/ML
INJECTION, SOLUTION INTRAVENOUS PRN
Status: DISCONTINUED | OUTPATIENT
Start: 2024-09-25 | End: 2024-09-26 | Stop reason: HOSPADM

## 2024-09-25 RX ORDER — LIDOCAINE HYDROCHLORIDE 10 MG/ML
50 INJECTION, SOLUTION EPIDURAL; INFILTRATION; INTRACAUDAL; PERINEURAL ONCE
Status: DISCONTINUED | OUTPATIENT
Start: 2024-09-25 | End: 2024-09-26 | Stop reason: HOSPADM

## 2024-09-25 RX ADMIN — MEROPENEM 1000 MG: 1 INJECTION INTRAVENOUS at 21:21

## 2024-09-25 RX ADMIN — SODIUM CHLORIDE, PRESERVATIVE FREE 10 ML: 5 INJECTION INTRAVENOUS at 09:27

## 2024-09-25 RX ADMIN — ATORVASTATIN CALCIUM 40 MG: 40 TABLET, FILM COATED ORAL at 21:16

## 2024-09-25 RX ADMIN — ASPIRIN 81 MG: 81 TABLET, COATED ORAL at 09:27

## 2024-09-25 RX ADMIN — PANTOPRAZOLE SODIUM 40 MG: 40 INJECTION, POWDER, FOR SOLUTION INTRAVENOUS at 09:27

## 2024-09-25 RX ADMIN — MIRTAZAPINE 15 MG: 15 TABLET, FILM COATED ORAL at 19:07

## 2024-09-25 RX ADMIN — SODIUM CHLORIDE, PRESERVATIVE FREE 10 ML: 5 INJECTION INTRAVENOUS at 21:24

## 2024-09-25 RX ADMIN — ENOXAPARIN SODIUM 40 MG: 100 INJECTION SUBCUTANEOUS at 09:27

## 2024-09-25 RX ADMIN — MEROPENEM 1000 MG: 1 INJECTION INTRAVENOUS at 09:27

## 2024-09-25 ASSESSMENT — PAIN SCALES - GENERAL: PAINLEVEL_OUTOF10: 0

## 2024-09-26 VITALS
BODY MASS INDEX: 22.47 KG/M2 | HEART RATE: 84 BPM | DIASTOLIC BLOOD PRESSURE: 64 MMHG | RESPIRATION RATE: 18 BRPM | SYSTOLIC BLOOD PRESSURE: 125 MMHG | TEMPERATURE: 97.5 F | WEIGHT: 165.9 LBS | HEIGHT: 72 IN | OXYGEN SATURATION: 92 %

## 2024-09-26 PROBLEM — A41.9 SEPSIS (HCC): Status: RESOLVED | Noted: 2024-09-19 | Resolved: 2024-09-26

## 2024-09-26 LAB
ANION GAP SERPL CALC-SCNC: 5 MMOL/L (ref 2–12)
BACTERIA SPEC CULT: NORMAL
BACTERIA SPEC CULT: NORMAL
BASOPHILS # BLD: 0 K/UL (ref 0–0.1)
BASOPHILS NFR BLD: 0 % (ref 0–1)
BUN SERPL-MCNC: 18 MG/DL (ref 6–20)
BUN/CREAT SERPL: 16 (ref 12–20)
CALCIUM SERPL-MCNC: 8.4 MG/DL (ref 8.5–10.1)
CHLORIDE SERPL-SCNC: 117 MMOL/L (ref 97–108)
CO2 SERPL-SCNC: 21 MMOL/L (ref 21–32)
CREAT SERPL-MCNC: 1.14 MG/DL (ref 0.7–1.3)
DIFFERENTIAL METHOD BLD: ABNORMAL
EOSINOPHIL # BLD: 0.4 K/UL (ref 0–0.4)
EOSINOPHIL NFR BLD: 4 % (ref 0–7)
ERYTHROCYTE [DISTWIDTH] IN BLOOD BY AUTOMATED COUNT: 15.1 % (ref 11.5–14.5)
GLUCOSE SERPL-MCNC: 93 MG/DL (ref 65–100)
HCT VFR BLD AUTO: 25.5 % (ref 36.6–50.3)
HGB BLD-MCNC: 8.4 G/DL (ref 12.1–17)
IMM GRANULOCYTES # BLD AUTO: 0.1 K/UL (ref 0–0.04)
IMM GRANULOCYTES NFR BLD AUTO: 1 % (ref 0–0.5)
LYMPHOCYTES # BLD: 1.9 K/UL (ref 0.8–3.5)
LYMPHOCYTES NFR BLD: 16 % (ref 12–49)
MCH RBC QN AUTO: 31.6 PG (ref 26–34)
MCHC RBC AUTO-ENTMCNC: 32.9 G/DL (ref 30–36.5)
MCV RBC AUTO: 95.9 FL (ref 80–99)
MONOCYTES # BLD: 0.6 K/UL (ref 0–1)
MONOCYTES NFR BLD: 5 % (ref 5–13)
NEUTS SEG # BLD: 8.4 K/UL (ref 1.8–8)
NEUTS SEG NFR BLD: 74 % (ref 32–75)
NRBC # BLD: 0 K/UL (ref 0–0.01)
NRBC BLD-RTO: 0 PER 100 WBC
PLATELET # BLD AUTO: 288 K/UL (ref 150–400)
PMV BLD AUTO: 10 FL (ref 8.9–12.9)
POTASSIUM SERPL-SCNC: 3.8 MMOL/L (ref 3.5–5.1)
RBC # BLD AUTO: 2.66 M/UL (ref 4.1–5.7)
SERVICE CMNT-IMP: NORMAL
SERVICE CMNT-IMP: NORMAL
SODIUM SERPL-SCNC: 143 MMOL/L (ref 136–145)
WBC # BLD AUTO: 11.4 K/UL (ref 4.1–11.1)

## 2024-09-26 PROCEDURE — 2580000003 HC RX 258: Performed by: INTERNAL MEDICINE

## 2024-09-26 PROCEDURE — 80048 BASIC METABOLIC PNL TOTAL CA: CPT

## 2024-09-26 PROCEDURE — 97530 THERAPEUTIC ACTIVITIES: CPT

## 2024-09-26 PROCEDURE — 97535 SELF CARE MNGMENT TRAINING: CPT

## 2024-09-26 PROCEDURE — 6360000002 HC RX W HCPCS: Performed by: HOSPITALIST

## 2024-09-26 PROCEDURE — 6370000000 HC RX 637 (ALT 250 FOR IP): Performed by: INTERNAL MEDICINE

## 2024-09-26 PROCEDURE — 6360000002 HC RX W HCPCS: Performed by: INTERNAL MEDICINE

## 2024-09-26 PROCEDURE — 6370000000 HC RX 637 (ALT 250 FOR IP): Performed by: HOSPITALIST

## 2024-09-26 PROCEDURE — 85025 COMPLETE CBC W/AUTO DIFF WBC: CPT

## 2024-09-26 PROCEDURE — 97116 GAIT TRAINING THERAPY: CPT

## 2024-09-26 RX ORDER — OXYCODONE HYDROCHLORIDE 5 MG/1
5 TABLET ORAL EVERY 4 HOURS PRN
Qty: 20 TABLET | Refills: 0 | Status: SHIPPED | OUTPATIENT
Start: 2024-09-26 | End: 2024-10-01

## 2024-09-26 RX ORDER — TRAZODONE HYDROCHLORIDE 50 MG/1
25 TABLET, FILM COATED ORAL NIGHTLY PRN
Qty: 30 TABLET | Refills: 0 | Status: SHIPPED | OUTPATIENT
Start: 2024-09-26

## 2024-09-26 RX ORDER — PANTOPRAZOLE SODIUM 40 MG/1
40 TABLET, DELAYED RELEASE ORAL
Qty: 30 TABLET | Refills: 3 | Status: SHIPPED | OUTPATIENT
Start: 2024-09-27

## 2024-09-26 RX ORDER — MIRTAZAPINE 15 MG/1
15 TABLET, FILM COATED ORAL EVERY EVENING
Qty: 30 TABLET | Refills: 3 | Status: SHIPPED | OUTPATIENT
Start: 2024-09-26

## 2024-09-26 RX ADMIN — MEROPENEM 1000 MG: 1 INJECTION INTRAVENOUS at 10:36

## 2024-09-26 RX ADMIN — PANTOPRAZOLE SODIUM 40 MG: 40 TABLET, DELAYED RELEASE ORAL at 06:01

## 2024-09-26 RX ADMIN — ASPIRIN 81 MG: 81 TABLET, COATED ORAL at 10:31

## 2024-09-26 RX ADMIN — ENOXAPARIN SODIUM 40 MG: 100 INJECTION SUBCUTANEOUS at 10:32

## 2024-09-26 RX ADMIN — OXYCODONE 5 MG: 5 TABLET ORAL at 12:41

## 2024-09-26 RX ADMIN — SODIUM CHLORIDE: 9 INJECTION, SOLUTION INTRAVENOUS at 06:01

## 2024-09-26 ASSESSMENT — PAIN DESCRIPTION - ORIENTATION: ORIENTATION: LOWER

## 2024-09-26 ASSESSMENT — PAIN DESCRIPTION - DESCRIPTORS: DESCRIPTORS: ACHING

## 2024-09-26 ASSESSMENT — PAIN SCALES - GENERAL: PAINLEVEL_OUTOF10: 7

## 2024-09-26 ASSESSMENT — PAIN DESCRIPTION - LOCATION: LOCATION: BACK

## 2024-10-01 LAB
BASOPHILS # BLD AUTO: 0.1 X10E3/UL (ref 0–0.2)
BASOPHILS NFR BLD AUTO: 1 %
BUN SERPL-MCNC: 17 MG/DL (ref 8–27)
CREAT SERPL-MCNC: 1.04 MG/DL (ref 0.76–1.27)
EGFRCR SERPLBLD CKD-EPI 2021: 71 ML/MIN/1.73
EOSINOPHIL # BLD AUTO: 0.3 X10E3/UL (ref 0–0.4)
EOSINOPHIL NFR BLD AUTO: 3 %
ERYTHROCYTE [DISTWIDTH] IN BLOOD BY AUTOMATED COUNT: 14.5 % (ref 11.6–15.4)
HCT VFR BLD AUTO: 25.5 % (ref 37.5–51)
HGB BLD-MCNC: 7.9 G/DL (ref 13–17.7)
IMM GRANULOCYTES # BLD AUTO: 0.1 X10E3/UL (ref 0–0.1)
IMM GRANULOCYTES NFR BLD AUTO: 1 %
LYMPHOCYTES # BLD AUTO: 1.8 X10E3/UL (ref 0.7–3.1)
LYMPHOCYTES NFR BLD AUTO: 19 %
MCH RBC QN AUTO: 30.9 PG (ref 26.6–33)
MCHC RBC AUTO-ENTMCNC: 31 G/DL (ref 31.5–35.7)
MCV RBC AUTO: 100 FL (ref 79–97)
MONOCYTES # BLD AUTO: 0.5 X10E3/UL (ref 0.1–0.9)
MONOCYTES NFR BLD AUTO: 5 %
NEUTROPHILS # BLD AUTO: 7.1 X10E3/UL (ref 1.4–7)
NEUTROPHILS NFR BLD AUTO: 71 %
PLATELET # BLD AUTO: 330 X10E3/UL (ref 150–450)
RBC # BLD AUTO: 2.56 X10E6/UL (ref 4.14–5.8)
WBC # BLD AUTO: 9.8 X10E3/UL (ref 3.4–10.8)

## 2024-10-04 ENCOUNTER — TELEMEDICINE (OUTPATIENT)
Age: 84
End: 2024-10-04
Payer: MEDICARE

## 2024-10-04 DIAGNOSIS — Z93.6 NEPHROSTOMY STATUS (HCC): ICD-10-CM

## 2024-10-04 DIAGNOSIS — C79.9 METASTASIS FROM BLADDER CANCER (HCC): Primary | ICD-10-CM

## 2024-10-04 DIAGNOSIS — C61 PROSTATE CANCER (HCC): ICD-10-CM

## 2024-10-04 DIAGNOSIS — C67.9 METASTASIS FROM BLADDER CANCER (HCC): Primary | ICD-10-CM

## 2024-10-04 DIAGNOSIS — R64 CACHEXIA (HCC): ICD-10-CM

## 2024-10-04 PROCEDURE — 1111F DSCHRG MED/CURRENT MED MERGE: CPT | Performed by: INTERNAL MEDICINE

## 2024-10-04 PROCEDURE — 1036F TOBACCO NON-USER: CPT | Performed by: INTERNAL MEDICINE

## 2024-10-04 PROCEDURE — 99214 OFFICE O/P EST MOD 30 MIN: CPT | Performed by: INTERNAL MEDICINE

## 2024-10-04 PROCEDURE — G8427 DOCREV CUR MEDS BY ELIG CLIN: HCPCS | Performed by: INTERNAL MEDICINE

## 2024-10-04 PROCEDURE — G8484 FLU IMMUNIZE NO ADMIN: HCPCS | Performed by: INTERNAL MEDICINE

## 2024-10-04 PROCEDURE — 1123F ACP DISCUSS/DSCN MKR DOCD: CPT | Performed by: INTERNAL MEDICINE

## 2024-10-04 PROCEDURE — G8420 CALC BMI NORM PARAMETERS: HCPCS | Performed by: INTERNAL MEDICINE

## 2024-10-04 NOTE — ASSESSMENT & PLAN NOTE
Patient has decided not to have any further aggressive treatments including chemotherapy  Has been followed by urologist  He was evaluated by palliative care hospitalization  Hospice has been recommended but patient is hesitant despite family's agreement  I had a long talk with patient and family regarding hospice benefits at this stage.  Advised patient to contact us if he decides to start hospice.

## 2024-10-04 NOTE — ASSESSMENT & PLAN NOTE
Patient has lost a lot of weight and has low energy  Appetite is poor with minimal oral intake according to family  Advised family to increase patient's calorie intake by giving him anything he enjoys at this point  Advised family to give patient supplements like Ensure or boost which contain high calories and protein  Patient is receiving home health PT and OT at this point

## 2024-10-04 NOTE — PROGRESS NOTES
are moist    External Ears [x] Normal  [] Abnormal -    Neck: [x] No visualized mass [] Abnormal -     Pulmonary/Chest: [x] Respiratory effort normal   [x] No visualized signs of difficulty breathing or respiratory distress        [] Abnormal -      Musculoskeletal:   [] Normal gait with no signs of ataxia         [] Normal range of motion of neck        [x] Abnormal -     Neurological:        [x] No Facial Asymmetry (Cranial nerve 7 motor function) (limited exam due to video visit)          [x] No gaze palsy        [x] Abnormal -generalized weakness        Skin:        [x] No significant exanthematous lesions or discoloration noted on facial skin         [] Abnormal -            Psychiatric:       [x] Normal Affect [] Abnormal -        [x] No Hallucinations    Other pertinent observable physical exam findings:-Has generalized weakness with cachexia.  Speech is slow.         On this date 10/4/2024 I have spent 30 minutes reviewing previous notes, test results and face to face (virtual) with the patient discussing the diagnosis and importance of compliance with the treatment plan as well as documenting on the day of the visit.    --Tez Srinivasan DO

## 2024-10-07 ENCOUNTER — APPOINTMENT (OUTPATIENT)
Facility: HOSPITAL | Age: 84
End: 2024-10-07
Payer: MEDICARE

## 2024-10-07 ENCOUNTER — HOSPITAL ENCOUNTER (INPATIENT)
Facility: HOSPITAL | Age: 84
LOS: 3 days | Discharge: HOSPICE | End: 2024-10-10
Attending: EMERGENCY MEDICINE | Admitting: INTERNAL MEDICINE
Payer: MEDICARE

## 2024-10-07 DIAGNOSIS — R09.02 HYPOXIA: Primary | ICD-10-CM

## 2024-10-07 DIAGNOSIS — E87.70 HYPERVOLEMIA, UNSPECIFIED HYPERVOLEMIA TYPE: ICD-10-CM

## 2024-10-07 DIAGNOSIS — I50.9 ACUTE CONGESTIVE HEART FAILURE, UNSPECIFIED HEART FAILURE TYPE (HCC): ICD-10-CM

## 2024-10-07 DIAGNOSIS — I95.9 HYPOTENSION, UNSPECIFIED HYPOTENSION TYPE: ICD-10-CM

## 2024-10-07 LAB
ALBUMIN SERPL-MCNC: 3 G/DL (ref 3.5–5)
ALBUMIN/GLOB SERPL: 0.7 (ref 1.1–2.2)
ALP SERPL-CCNC: 65 U/L (ref 45–117)
ALT SERPL-CCNC: 8 U/L (ref 12–78)
AMORPH CRY URNS QL MICRO: ABNORMAL
ANION GAP SERPL CALC-SCNC: 6 MMOL/L (ref 2–12)
APPEARANCE UR: CLEAR
AST SERPL-CCNC: 26 U/L (ref 15–37)
B PERT DNA SPEC QL NAA+PROBE: NOT DETECTED
BACTERIA URNS QL MICRO: NEGATIVE /HPF
BASOPHILS # BLD: 0.1 K/UL (ref 0–0.1)
BASOPHILS NFR BLD: 1 % (ref 0–1)
BILIRUB SERPL-MCNC: 0.5 MG/DL (ref 0.2–1)
BILIRUB UR QL CFM: NEGATIVE
BORDETELLA PARAPERTUSSIS BY PCR: NOT DETECTED
BUN SERPL-MCNC: 20 MG/DL (ref 6–20)
BUN/CREAT SERPL: 15 (ref 12–20)
C PNEUM DNA SPEC QL NAA+PROBE: NOT DETECTED
CALCIUM SERPL-MCNC: 10.5 MG/DL (ref 8.5–10.1)
CHLORIDE SERPL-SCNC: 110 MMOL/L (ref 97–108)
CO2 SERPL-SCNC: 24 MMOL/L (ref 21–32)
COLOR UR: ABNORMAL
COMMENT:: NORMAL
CREAT SERPL-MCNC: 1.3 MG/DL (ref 0.7–1.3)
DIFFERENTIAL METHOD BLD: ABNORMAL
EKG ATRIAL RATE: 90 BPM
EKG DIAGNOSIS: NORMAL
EKG P AXIS: 45 DEGREES
EKG P-R INTERVAL: 166 MS
EKG Q-T INTERVAL: 424 MS
EKG QRS DURATION: 112 MS
EKG QTC CALCULATION (BAZETT): 518 MS
EKG R AXIS: -5 DEGREES
EKG T AXIS: 34 DEGREES
EKG VENTRICULAR RATE: 90 BPM
EOSINOPHIL # BLD: 0.2 K/UL (ref 0–0.4)
EOSINOPHIL NFR BLD: 3 % (ref 0–7)
EPITH CASTS URNS QL MICRO: ABNORMAL /LPF
ERYTHROCYTE [DISTWIDTH] IN BLOOD BY AUTOMATED COUNT: 15.2 % (ref 11.5–14.5)
FLUAV RNA SPEC QL NAA+PROBE: NOT DETECTED
FLUAV SUBTYP SPEC NAA+PROBE: NOT DETECTED
FLUBV RNA SPEC QL NAA+PROBE: NOT DETECTED
FLUBV RNA SPEC QL NAA+PROBE: NOT DETECTED
GLOBULIN SER CALC-MCNC: 4.2 G/DL (ref 2–4)
GLUCOSE SERPL-MCNC: 100 MG/DL (ref 65–100)
GLUCOSE UR STRIP.AUTO-MCNC: NEGATIVE MG/DL
HADV DNA SPEC QL NAA+PROBE: NOT DETECTED
HCOV 229E RNA SPEC QL NAA+PROBE: NOT DETECTED
HCOV HKU1 RNA SPEC QL NAA+PROBE: NOT DETECTED
HCOV NL63 RNA SPEC QL NAA+PROBE: NOT DETECTED
HCOV OC43 RNA SPEC QL NAA+PROBE: NOT DETECTED
HCT VFR BLD AUTO: 33.4 % (ref 36.6–50.3)
HGB BLD-MCNC: 10.6 G/DL (ref 12.1–17)
HGB UR QL STRIP: ABNORMAL
HMPV RNA SPEC QL NAA+PROBE: NOT DETECTED
HPIV1 RNA SPEC QL NAA+PROBE: NOT DETECTED
HPIV2 RNA SPEC QL NAA+PROBE: NOT DETECTED
HPIV3 RNA SPEC QL NAA+PROBE: NOT DETECTED
HPIV4 RNA SPEC QL NAA+PROBE: NOT DETECTED
IMM GRANULOCYTES # BLD AUTO: 0.1 K/UL (ref 0–0.04)
IMM GRANULOCYTES NFR BLD AUTO: 1 % (ref 0–0.5)
IRON SATN MFR SERPL: 14 % (ref 20–50)
IRON SERPL-MCNC: 29 UG/DL (ref 35–150)
KETONES UR QL STRIP.AUTO: 40 MG/DL
LEUKOCYTE ESTERASE UR QL STRIP.AUTO: ABNORMAL
LYMPHOCYTES # BLD: 1.1 K/UL (ref 0.8–3.5)
LYMPHOCYTES NFR BLD: 15 % (ref 12–49)
M PNEUMO DNA SPEC QL NAA+PROBE: NOT DETECTED
MCH RBC QN AUTO: 31.6 PG (ref 26–34)
MCHC RBC AUTO-ENTMCNC: 31.7 G/DL (ref 30–36.5)
MCV RBC AUTO: 99.7 FL (ref 80–99)
MONOCYTES # BLD: 0.4 K/UL (ref 0–1)
MONOCYTES NFR BLD: 5 % (ref 5–13)
NEUTS SEG # BLD: 5.3 K/UL (ref 1.8–8)
NEUTS SEG NFR BLD: 75 % (ref 32–75)
NITRITE UR QL STRIP.AUTO: NEGATIVE
NRBC # BLD: 0 K/UL (ref 0–0.01)
NRBC BLD-RTO: 0 PER 100 WBC
NT PRO BNP: 1902 PG/ML
PH UR STRIP: 6 (ref 5–8)
PLATELET # BLD AUTO: 222 K/UL (ref 150–400)
PMV BLD AUTO: 9.8 FL (ref 8.9–12.9)
POTASSIUM SERPL-SCNC: 4.5 MMOL/L (ref 3.5–5.1)
PROT SERPL-MCNC: 7.2 G/DL (ref 6.4–8.2)
PROT UR STRIP-MCNC: 30 MG/DL
RBC # BLD AUTO: 3.35 M/UL (ref 4.1–5.7)
RBC #/AREA URNS HPF: ABNORMAL /HPF (ref 0–5)
RBC MORPH BLD: ABNORMAL
RSV RNA SPEC QL NAA+PROBE: NOT DETECTED
RV+EV RNA SPEC QL NAA+PROBE: NOT DETECTED
SARS-COV-2 RNA RESP QL NAA+PROBE: NOT DETECTED
SARS-COV-2 RNA RESP QL NAA+PROBE: NOT DETECTED
SODIUM SERPL-SCNC: 140 MMOL/L (ref 136–145)
SOURCE: NORMAL
SP GR UR REFRACTOMETRY: 1.02 (ref 1–1.03)
SPECIMEN HOLD: NORMAL
TIBC SERPL-MCNC: 214 UG/DL (ref 250–450)
TROPONIN I SERPL HS-MCNC: 41 NG/L (ref 0–76)
UROBILINOGEN UR QL STRIP.AUTO: 0.2 EU/DL (ref 0.2–1)
WBC # BLD AUTO: 7.2 K/UL (ref 4.1–11.1)
WBC URNS QL MICRO: ABNORMAL /HPF (ref 0–4)

## 2024-10-07 PROCEDURE — 85025 COMPLETE CBC W/AUTO DIFF WBC: CPT

## 2024-10-07 PROCEDURE — 83880 ASSAY OF NATRIURETIC PEPTIDE: CPT

## 2024-10-07 PROCEDURE — 80053 COMPREHEN METABOLIC PANEL: CPT

## 2024-10-07 PROCEDURE — 83540 ASSAY OF IRON: CPT

## 2024-10-07 PROCEDURE — 76770 US EXAM ABDO BACK WALL COMP: CPT

## 2024-10-07 PROCEDURE — 87636 SARSCOV2 & INF A&B AMP PRB: CPT

## 2024-10-07 PROCEDURE — 83550 IRON BINDING TEST: CPT

## 2024-10-07 PROCEDURE — 36415 COLL VENOUS BLD VENIPUNCTURE: CPT

## 2024-10-07 PROCEDURE — 0202U NFCT DS 22 TRGT SARS-COV-2: CPT

## 2024-10-07 PROCEDURE — 93010 ELECTROCARDIOGRAM REPORT: CPT | Performed by: INTERNAL MEDICINE

## 2024-10-07 PROCEDURE — 2580000003 HC RX 258: Performed by: INTERNAL MEDICINE

## 2024-10-07 PROCEDURE — 6370000000 HC RX 637 (ALT 250 FOR IP): Performed by: INTERNAL MEDICINE

## 2024-10-07 PROCEDURE — 99223 1ST HOSP IP/OBS HIGH 75: CPT | Performed by: INTERNAL MEDICINE

## 2024-10-07 PROCEDURE — 2580000003 HC RX 258: Performed by: EMERGENCY MEDICINE

## 2024-10-07 PROCEDURE — 6360000002 HC RX W HCPCS: Performed by: INTERNAL MEDICINE

## 2024-10-07 PROCEDURE — 84484 ASSAY OF TROPONIN QUANT: CPT

## 2024-10-07 PROCEDURE — 87086 URINE CULTURE/COLONY COUNT: CPT

## 2024-10-07 PROCEDURE — 71045 X-RAY EXAM CHEST 1 VIEW: CPT

## 2024-10-07 PROCEDURE — 81001 URINALYSIS AUTO W/SCOPE: CPT

## 2024-10-07 PROCEDURE — 6370000000 HC RX 637 (ALT 250 FOR IP)

## 2024-10-07 PROCEDURE — 2060000000 HC ICU INTERMEDIATE R&B

## 2024-10-07 PROCEDURE — 99285 EMERGENCY DEPT VISIT HI MDM: CPT

## 2024-10-07 PROCEDURE — 93005 ELECTROCARDIOGRAM TRACING: CPT | Performed by: EMERGENCY MEDICINE

## 2024-10-07 RX ORDER — ONDANSETRON 4 MG/1
4 TABLET, ORALLY DISINTEGRATING ORAL EVERY 8 HOURS PRN
Status: DISCONTINUED | OUTPATIENT
Start: 2024-10-07 | End: 2024-10-10 | Stop reason: HOSPADM

## 2024-10-07 RX ORDER — ACETAMINOPHEN 325 MG/1
650 TABLET ORAL EVERY 6 HOURS PRN
Status: DISCONTINUED | OUTPATIENT
Start: 2024-10-07 | End: 2024-10-10 | Stop reason: HOSPADM

## 2024-10-07 RX ORDER — LANOLIN ALCOHOL/MO/W.PET/CERES
3 CREAM (GRAM) TOPICAL NIGHTLY PRN
Status: DISCONTINUED | OUTPATIENT
Start: 2024-10-07 | End: 2024-10-10 | Stop reason: HOSPADM

## 2024-10-07 RX ORDER — FUROSEMIDE 10 MG/ML
20 INJECTION INTRAMUSCULAR; INTRAVENOUS ONCE
Status: DISCONTINUED | OUTPATIENT
Start: 2024-10-07 | End: 2024-10-10 | Stop reason: HOSPADM

## 2024-10-07 RX ORDER — 0.9 % SODIUM CHLORIDE 0.9 %
250 INTRAVENOUS SOLUTION INTRAVENOUS ONCE
Status: DISCONTINUED | OUTPATIENT
Start: 2024-10-07 | End: 2024-10-07

## 2024-10-07 RX ORDER — PANTOPRAZOLE SODIUM 40 MG/1
40 TABLET, DELAYED RELEASE ORAL
Status: DISCONTINUED | OUTPATIENT
Start: 2024-10-08 | End: 2024-10-10 | Stop reason: HOSPADM

## 2024-10-07 RX ORDER — 0.9 % SODIUM CHLORIDE 0.9 %
500 INTRAVENOUS SOLUTION INTRAVENOUS ONCE
Status: COMPLETED | OUTPATIENT
Start: 2024-10-07 | End: 2024-10-07

## 2024-10-07 RX ORDER — SODIUM CHLORIDE 0.9 % (FLUSH) 0.9 %
5-40 SYRINGE (ML) INJECTION EVERY 12 HOURS SCHEDULED
Status: DISCONTINUED | OUTPATIENT
Start: 2024-10-07 | End: 2024-10-10 | Stop reason: HOSPADM

## 2024-10-07 RX ORDER — POLYETHYLENE GLYCOL 3350 17 G/17G
17 POWDER, FOR SOLUTION ORAL DAILY PRN
Status: DISCONTINUED | OUTPATIENT
Start: 2024-10-07 | End: 2024-10-10 | Stop reason: HOSPADM

## 2024-10-07 RX ORDER — ATORVASTATIN CALCIUM 40 MG/1
40 TABLET, FILM COATED ORAL NIGHTLY
Status: DISCONTINUED | OUTPATIENT
Start: 2024-10-07 | End: 2024-10-10 | Stop reason: HOSPADM

## 2024-10-07 RX ORDER — SODIUM CHLORIDE 9 MG/ML
INJECTION, SOLUTION INTRAVENOUS PRN
Status: DISCONTINUED | OUTPATIENT
Start: 2024-10-07 | End: 2024-10-10 | Stop reason: HOSPADM

## 2024-10-07 RX ORDER — SODIUM CHLORIDE 0.9 % (FLUSH) 0.9 %
5-40 SYRINGE (ML) INJECTION PRN
Status: DISCONTINUED | OUTPATIENT
Start: 2024-10-07 | End: 2024-10-10 | Stop reason: HOSPADM

## 2024-10-07 RX ORDER — ONDANSETRON 2 MG/ML
4 INJECTION INTRAMUSCULAR; INTRAVENOUS EVERY 6 HOURS PRN
Status: DISCONTINUED | OUTPATIENT
Start: 2024-10-07 | End: 2024-10-10 | Stop reason: HOSPADM

## 2024-10-07 RX ADMIN — SODIUM CHLORIDE, PRESERVATIVE FREE 10 ML: 5 INJECTION INTRAVENOUS at 20:08

## 2024-10-07 RX ADMIN — SODIUM CHLORIDE 500 ML: 9 INJECTION, SOLUTION INTRAVENOUS at 14:42

## 2024-10-07 RX ADMIN — SODIUM CHLORIDE 500 ML: 9 INJECTION, SOLUTION INTRAVENOUS at 11:48

## 2024-10-07 RX ADMIN — ATORVASTATIN CALCIUM 40 MG: 40 TABLET, FILM COATED ORAL at 23:07

## 2024-10-07 RX ADMIN — Medication 3 MG: at 23:07

## 2024-10-07 RX ADMIN — WATER 1000 MG: 1 INJECTION INTRAMUSCULAR; INTRAVENOUS; SUBCUTANEOUS at 23:06

## 2024-10-07 ASSESSMENT — ENCOUNTER SYMPTOMS
COLOR CHANGE: 0
SHORTNESS OF BREATH: 1
VOMITING: 0
NAUSEA: 0
SORE THROAT: 0
COUGH: 0
DIARRHEA: 0
BACK PAIN: 0
HEMOPTYSIS: 0
RHINORRHEA: 0
SPUTUM PRODUCTION: 0
ABDOMINAL PAIN: 0
EYE PAIN: 0

## 2024-10-07 ASSESSMENT — PAIN SCALES - GENERAL
PAINLEVEL_OUTOF10: 0
PAINLEVEL_OUTOF10: 0

## 2024-10-07 ASSESSMENT — LIFESTYLE VARIABLES
HOW OFTEN DO YOU HAVE A DRINK CONTAINING ALCOHOL: NEVER
HOW MANY STANDARD DRINKS CONTAINING ALCOHOL DO YOU HAVE ON A TYPICAL DAY: PATIENT DOES NOT DRINK

## 2024-10-07 ASSESSMENT — PAIN - FUNCTIONAL ASSESSMENT: PAIN_FUNCTIONAL_ASSESSMENT: 0-10

## 2024-10-07 NOTE — H&P
History and Physical    Date of Service:  10/7/2024  Primary Care Provider: Tez Srinivasan DO  Source of information: The patient, Chart review, and Spouse/family member    Chief Complaint: Shortness of Breath      History of Presenting Illness:   Jose Fuentes is a 84 y.o. 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    Multiple family members at bedside. Pt has been confused since last night. HH nurse reported hypoxia 80s, hypotension with SBP 80s. Reported SOB worse with exertion. Appetite and po fluid/food intake decreased, weakness increased, now unable to walk, intermittent nausea/vomiting, unintentional weight loss.      Pt denies pain, SOB, falls, injuries, LOC, f/c, diarrhea, constipation.     Pt is s/p [facility] hospitalization 9/19-9/26/2024 with severe sepsis, ESBL E coli bacteremia, E coli UTI, bilateral hydronephrosis and L hydroureter s/p L nephrostomy, elevated troponin due to demand ischemia.       REVIEW OF SYSTEMS:  Pertinent items are noted in the History of Present Illness.     Past Medical History:   Diagnosis Date    Bladder cancer (HCC)     CAD (coronary artery disease)     MI 2004    Cancer (HCC)     prostate s/p xrt    Macular degeneration     MI (myocardial infarction) (HCC) 01/01/2004      Past Surgical History:   Procedure Laterality Date    APPENDECTOMY  1955    CATARACT REMOVAL      bilateral    COLONOSCOPY N/A 8/11/2024    COLONOSCOPY DIAGNOSTIC performed by Ferdinand Bueno MD at [facility] MAIN OR    IR NEPHROSTOMY PERCUTANEOUS LEFT  8/10/2024    IR NEPHROSTOMY PERCUTANEOUS LEFT 8/10/2024 [facility] RAD ANGIO IR    LA UNLISTED PROCEDURE CARDIAC SURGERY      2 Stents    UROLOGICAL SURGERY  6/24/15    RIGHT ROBOTIC PARTIAL NEPHRECTOMY  (LATEX  Blood, Urine LARGE (*)     Leukocyte Esterase, Urine SMALL (*)     Amorphous Crystal 3+ (*)     All other components within normal limits     IMAGING:   XR CHEST PORTABLE   Final Result      No acute process on portable chest.         Electronically signed by RYAN NELSON         Notes reviewed from all clinical/nonclinical/nursing services involved in patient's clinical care. Care coordination discussions were held with appropriate clinical/nonclinical/ nursing providers based on care coordination needs.     Assessment/Plan:   Given the patient's current clinical presentation, there is a high level of concern for decompensation if discharged from the emergency department. Complex decision making was performed, which includes reviewing the patient's available past medical records, laboratory results, and imaging studies.    Volume overload with suspected IVVD  CAD s/p stents  - admit inpatient telemetry  - IV furosemide started in ED but became hypotensive requiring small IVNS boluses  - consult Cardiology  - BNP elevated but less than previous  - trop negative x1  - flu/COVID PCR swab negative  - CXR no acute process  - check RVP  - check TTE  - strict I/Os, daily weight, low Na diet  - supplemental O2 NC for comfort    Hypercalcemia  - likely due to IVVD given decreased fluid intake  - s/p small IVNS boluses in the ED  - follow labs    Acute metabolic encephalopathy  Possible persistent/recurrent CAUTI s/p recent abx use  - started last night per family  - suspect multifactorial etiology  - UC 10/7 pending  - stat empiric CTX x7 days and de-escalate as indicated  - hold home mirtazapine, trazodone    Stage IV bladder CA  H/o prostate CA s/p XRT  Persistent hematuria  Bilateral hydronephrosis and L hydroureter s/p L nephrostomy  CKD Stage 4  S/p partial L nephrectomy  - creatinine at baseline  - hold home ASA for now  - check renal US due to decreased urine output from L nephrostomy    Macular degeneration  H/o

## 2024-10-07 NOTE — ED PROVIDER NOTES
Connections: Feeling Socially Integrated (7/28/2023)    OASIS : Social Isolation     Frequency of experiencing loneliness or isolation: Never   Housing Stability: Low Risk  (9/20/2024)    Housing Stability Vital Sign     Unable to Pay for Housing in the Last Year: No     Number of Times Moved in the Last Year: 0     Homeless in the Last Year: No           PHYSICAL EXAM    (up to 7 for level 4, 8 or more for level 5)     ED Triage Vitals [10/07/24 1053]   BP Systolic BP Percentile Diastolic BP Percentile Temp Temp Source Pulse Respirations SpO2   (!) 116/51 -- -- 97.8 °F (36.6 °C) Oral 89 16 93 %      Height Weight         1.829 m (6') --             Body mass index is 22.5 kg/m².    Physical Exam  Vitals and nursing note reviewed.   Constitutional:       Appearance: He is well-developed.   HENT:      Head: Normocephalic and atraumatic.      Mouth/Throat:      Mouth: Mucous membranes are moist.      Pharynx: Oropharynx is clear.   Eyes:      Extraocular Movements: Extraocular movements intact.      Pupils: Pupils are equal, round, and reactive to light.   Cardiovascular:      Rate and Rhythm: Normal rate and regular rhythm.   Pulmonary:      Effort: Pulmonary effort is normal. No respiratory distress.      Breath sounds: Normal breath sounds. No stridor. No wheezing.   Abdominal:      General: Abdomen is flat. Bowel sounds are normal. There is no distension or abdominal bruit. There are no signs of injury.      Palpations: Abdomen is soft.      Tenderness: There is no abdominal tenderness.      Hernia: No hernia is present.   Skin:     General: Skin is warm and dry.   Neurological:      General: No focal deficit present.      Mental Status: He is alert.   Psychiatric:         Mood and Affect: Mood normal.         Behavior: Behavior normal.         DIAGNOSTIC RESULTS     EKG: All EKG's are interpreted by the Emergency Department Physician who either signs or Co-signs this chart in the absence of a  02:27:39 PM      PATIENT REFERRED TO:  No follow-up provider specified.    DISCHARGE MEDICATIONS:  New Prescriptions    No medications on file         (Please note that portions of this note were completed with a voice recognition program.  Efforts were made to edit the dictations but occasionally words are mis-transcribed.)    Deandre Reynolds MD (electronically signed)  Emergency Attending Physician / Physician Assistant / Nurse Practitioner             Deandre Reynolds MD  10/07/24 7167

## 2024-10-07 NOTE — ED NOTES
TRIAGE NOTE:  Patient arrives from home with low oxygen saturations and generally not feeling well.      History of prostate Cancer stage 4, left nephrostomy tube in place, draining clear yellow urine.      Patient is alert and oriented.     Noted to be 90% on room air, placed on oxygen @ 2L via NC.

## 2024-10-07 NOTE — ED NOTES
ED TO INPATIENT SBAR HANDOFF    Patient Name: Jose Fuentes   :  1940  84 y.o.   MRN:  [id number]  ED Room #:  ER02/02  Family/Caregiver Present yes   Restraints no   Sitter no   Sepsis Risk Score      Situation  Code Status: Full Code     Allergies: Latex  Weight: No data found.  Arrived from: home  Chief Complaint:   Chief Complaint   Patient presents with    Shortness of Breath     Hospital Problem/Diagnosis:  Principal Problem:    Volume overload  Resolved Problems:    * No resolved hospital problems. *    Imaging:   XR CHEST PORTABLE   Final Result      No acute process on portable chest.         Electronically signed by RYAN PORTILLO RETROPERITONEAL COMPLETE    (Results Pending)     Abnormal labs:   Abnormal Labs Reviewed   CBC WITH AUTO DIFFERENTIAL - Abnormal; Notable for the following components:       Result Value    RBC 3.35 (*)     Hemoglobin 10.6 (*)     Hematocrit 33.4 (*)     MCV 99.7 (*)     RDW 15.2 (*)     Immature Granulocytes % 1 (*)     Immature Granulocytes Absolute 0.1 (*)     All other components within normal limits   COMPREHENSIVE METABOLIC PANEL - Abnormal; Notable for the following components:    Chloride 110 (*)     Est, Glom Filt Rate 55 (*)     Calcium 10.5 (*)     ALT 8 (*)     Albumin 3.0 (*)     Globulin 4.2 (*)     Albumin/Globulin Ratio 0.7 (*)     All other components within normal limits   BRAIN NATRIURETIC PEPTIDE - Abnormal; Notable for the following components:    NT Pro-BNP 1,902 (*)     All other components within normal limits   URINALYSIS WITH MICROSCOPIC - Abnormal; Notable for the following components:    Protein, UA 30 (*)     Ketones, Urine 40 (*)     Blood, Urine LARGE (*)     Leukocyte Esterase, Urine SMALL (*)     Amorphous Crystal 3+ (*)     All other components within normal limits         Abnormal Assessment Findings: disoriented at times, hypoxia, CHF, hypotension, L nephrostomy, BNP 1,902    Background  History:   Past Medical History:    Diagnosis Date    Bladder cancer (HCC)     CAD (coronary artery disease)     MI 2004    Cancer (HCC)     prostate s/p xrt    Macular degeneration     MI (myocardial infarction) (HCC) 01/01/2004       Assessment    Vitals/MEWS: MEWS Score: 1  Level of Consciousness: Alert (0)   Vitals:    10/07/24 1515 10/07/24 1530 10/07/24 1545 10/07/24 1600   BP: (!) 102/49 (!) 101/51 (!) 108/51 115/83   Pulse: 94 89 84 85   Resp: 15 15 13 21   Temp:       TempSrc:       SpO2: 93% 94% 96% 95%   Height:         DI:   Predictive Model Details          43 (Caution)  Factor Value    Calculated 10/7/2024 16:03 30% Age 84 years old    Deterioration Index Model 26% Supplemental oxygen Nasal cannula     21% Neurological exam X     15% Respiratory rate 21     6% Potassium 4.5 mmol/L     1% Hematocrit abnormal (33.4 %)     1% Pulse 85     0% Systolic 115     0% Sodium 140 mmol/L     0% Temperature 97.8 °F (36.6 °C)     0% WBC count 7.2 K/uL     0% Pulse oximetry 95 %       FiO2 (%):   O2 Flow Rate: O2 Device: Nasal cannula O2 Flow Rate (L/min): 2 L/min  Cardiac Rhythm: Cardiac Rhythm: Sinus rhythm  Pain Scale: Pain Assessment  Pain Assessment: 0-10  Pain Level: 0  Patient's Stated Pain Goal: 0 - No pain  Last documented pain score (0-10 scale) Pain Level: 0  Last documented pain medication administered: n/a     Mental Status: disoriented and alert  Orientation Level: Orientation Level: Oriented X4, Oriented/disoriented at times  NIH Score:    C-SSRS: Risk of Suicide: No Risk  Bedside swallow:    Corinne Coma Scale (GCS): Corinne Coma Scale  Eye Opening: Spontaneous  Best Verbal Response: Oriented  Best Motor Response: Obeys commands  Eyal Coma Scale Score: 15  Active LDA's:   Peripheral IV 10/07/24 Posterior;Right Forearm (Active)     PO Status: Regular  Pertinent or High Risk Medications/Drips: no   If Yes, please provide details: n/a  Titratable drips: no   Pending Blood Product Administration: no   Mobility: limited bed mobility

## 2024-10-07 NOTE — CONSULTS
[facility] CARDIOLOGY  Cardiology Care Note                  [x]Initial visit     []Established visit     Patient Name: Jose Fuentes - :1940 - MRN:[id number]  Primary Cardiologist: None  Consulting Cardiologist: Raz Knutson MD     Reason for initial visit:       HPI:    Mr. Feuntes is a 83 yo male who presented to [facility] ER for chief compliant low oxygen saturations/generally unwell.    Noted to have oxygen sat 90% on RA, placed on 2L NC.  BNP 1902.   Given 20mg IV lasix.   CXR without acute process.     PMH includes metastatic urinary bladder cancer, CKD, left hydroureter with current L nephrostomy tube.  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      Recently saw pt in ER on -, replaced L nephrostomy tube.  Was seen for elevated troponin, pt was CP free and wanted conservative management.     Hospitalized 2024 to 2024, patient was admitted and treated for acute renal failure superimposed on chronic kidney disease as well as rectal bleeding, underwent nephrostomy tube placement secondary to left hydroureter.  Had TIA admission in 2024 -- on Asa/plavix.     TTE 2024 with normal EF 55-60% and normal diastolic function     Some reported history in chart about his of CAD/PCI, but no specific records.        Assessment and Plan     Elevated BNP/SOB: Probably low-grade heart failure symptoms.    Probably advanced kidney disease contributing to fluid retention.  Echocardiogram last assessed in 2024 was normal.  Agree with gentle diuresis.  Overall long-term intervention would only be recommended if goals of care are to be aggressive with treatment of his cancer which at this time appears not to be the case.    2.   Hyperlipidemia: on lipitor.    Lab Results   Component Value Date    CHOL 177 06/15/2024    TRIG 185 (H) 06/15/2024    HDL 50 06/15/2024    LDL 90 06/15/2024    VLDL 37 06/15/2024

## 2024-10-08 PROBLEM — R53.81 DEBILITY: Status: ACTIVE | Noted: 2024-10-08

## 2024-10-08 PROBLEM — R63.0 POOR APPETITE: Status: ACTIVE | Noted: 2024-10-08

## 2024-10-08 PROBLEM — Z51.5 PALLIATIVE CARE ENCOUNTER: Status: ACTIVE | Noted: 2024-10-08

## 2024-10-08 PROBLEM — R06.02 SHORTNESS OF BREATH: Status: ACTIVE | Noted: 2024-10-08

## 2024-10-08 LAB
ANION GAP SERPL CALC-SCNC: 6 MMOL/L (ref 2–12)
BACTERIA SPEC CULT: NORMAL
BASOPHILS # BLD: 0.1 K/UL (ref 0–0.1)
BASOPHILS NFR BLD: 1 % (ref 0–1)
BUN SERPL-MCNC: 19 MG/DL (ref 6–20)
BUN/CREAT SERPL: 17 (ref 12–20)
CALCIUM SERPL-MCNC: 10.2 MG/DL (ref 8.5–10.1)
CHLORIDE SERPL-SCNC: 113 MMOL/L (ref 97–108)
CHOLEST SERPL-MCNC: 143 MG/DL
CO2 SERPL-SCNC: 23 MMOL/L (ref 21–32)
CREAT SERPL-MCNC: 1.09 MG/DL (ref 0.7–1.3)
DIFFERENTIAL METHOD BLD: ABNORMAL
EOSINOPHIL # BLD: 0.5 K/UL (ref 0–0.4)
EOSINOPHIL NFR BLD: 7 % (ref 0–7)
ERYTHROCYTE [DISTWIDTH] IN BLOOD BY AUTOMATED COUNT: 15.1 % (ref 11.5–14.5)
GLUCOSE SERPL-MCNC: 102 MG/DL (ref 65–100)
HCT VFR BLD AUTO: 29.8 % (ref 36.6–50.3)
HDLC SERPL-MCNC: 37 MG/DL
HDLC SERPL: 3.9 (ref 0–5)
HGB BLD-MCNC: 9.6 G/DL (ref 12.1–17)
IMM GRANULOCYTES # BLD AUTO: 0 K/UL (ref 0–0.04)
IMM GRANULOCYTES NFR BLD AUTO: 0 % (ref 0–0.5)
LDLC SERPL CALC-MCNC: 74 MG/DL (ref 0–100)
LYMPHOCYTES # BLD: 1.2 K/UL (ref 0.8–3.5)
LYMPHOCYTES NFR BLD: 16 % (ref 12–49)
MAGNESIUM SERPL-MCNC: 1.9 MG/DL (ref 1.6–2.4)
MCH RBC QN AUTO: 31.7 PG (ref 26–34)
MCHC RBC AUTO-ENTMCNC: 32.2 G/DL (ref 30–36.5)
MCV RBC AUTO: 98.3 FL (ref 80–99)
MONOCYTES # BLD: 0.4 K/UL (ref 0–1)
MONOCYTES NFR BLD: 5 % (ref 5–13)
NEUTS SEG # BLD: 5.2 K/UL (ref 1.8–8)
NEUTS SEG NFR BLD: 71 % (ref 32–75)
NRBC # BLD: 0 K/UL (ref 0–0.01)
NRBC BLD-RTO: 0 PER 100 WBC
PHOSPHATE SERPL-MCNC: 2.7 MG/DL (ref 2.6–4.7)
PLATELET # BLD AUTO: 191 K/UL (ref 150–400)
PMV BLD AUTO: 10.2 FL (ref 8.9–12.9)
POTASSIUM SERPL-SCNC: 3.8 MMOL/L (ref 3.5–5.1)
RBC # BLD AUTO: 3.03 M/UL (ref 4.1–5.7)
SERVICE CMNT-IMP: NORMAL
SODIUM SERPL-SCNC: 142 MMOL/L (ref 136–145)
TRIGL SERPL-MCNC: 160 MG/DL
VLDLC SERPL CALC-MCNC: 32 MG/DL
WBC # BLD AUTO: 7.2 K/UL (ref 4.1–11.1)

## 2024-10-08 PROCEDURE — 6360000002 HC RX W HCPCS: Performed by: INTERNAL MEDICINE

## 2024-10-08 PROCEDURE — 97165 OT EVAL LOW COMPLEX 30 MIN: CPT

## 2024-10-08 PROCEDURE — 80061 LIPID PANEL: CPT

## 2024-10-08 PROCEDURE — 2580000003 HC RX 258: Performed by: INTERNAL MEDICINE

## 2024-10-08 PROCEDURE — 85025 COMPLETE CBC W/AUTO DIFF WBC: CPT

## 2024-10-08 PROCEDURE — 36415 COLL VENOUS BLD VENIPUNCTURE: CPT

## 2024-10-08 PROCEDURE — 84100 ASSAY OF PHOSPHORUS: CPT

## 2024-10-08 PROCEDURE — 2700000000 HC OXYGEN THERAPY PER DAY

## 2024-10-08 PROCEDURE — 94760 N-INVAS EAR/PLS OXIMETRY 1: CPT

## 2024-10-08 PROCEDURE — 6370000000 HC RX 637 (ALT 250 FOR IP): Performed by: INTERNAL MEDICINE

## 2024-10-08 PROCEDURE — 97530 THERAPEUTIC ACTIVITIES: CPT

## 2024-10-08 PROCEDURE — 83735 ASSAY OF MAGNESIUM: CPT

## 2024-10-08 PROCEDURE — 2060000000 HC ICU INTERMEDIATE R&B

## 2024-10-08 PROCEDURE — 80048 BASIC METABOLIC PNL TOTAL CA: CPT

## 2024-10-08 PROCEDURE — 97161 PT EVAL LOW COMPLEX 20 MIN: CPT | Performed by: PHYSICAL THERAPIST

## 2024-10-08 PROCEDURE — 99223 1ST HOSP IP/OBS HIGH 75: CPT | Performed by: PHYSICAL MEDICINE & REHABILITATION

## 2024-10-08 PROCEDURE — 97530 THERAPEUTIC ACTIVITIES: CPT | Performed by: PHYSICAL THERAPIST

## 2024-10-08 RX ADMIN — WATER 1000 MG: 1 INJECTION INTRAMUSCULAR; INTRAVENOUS; SUBCUTANEOUS at 22:14

## 2024-10-08 RX ADMIN — ATORVASTATIN CALCIUM 40 MG: 40 TABLET, FILM COATED ORAL at 20:48

## 2024-10-08 RX ADMIN — PANTOPRAZOLE SODIUM 40 MG: 40 TABLET, DELAYED RELEASE ORAL at 07:03

## 2024-10-08 NOTE — CONSULTS
Palliative Medicine  Patient Name: Jose Fuentes  YOB: 1940  MRN: [id number]  Age: 84 y.o.  Gender: male    Date of Initial Consult: 10/8/24  Date of Service: 10/8/2024  Time: 4:55 PM  Provider: Bonny Bunn MD  Hospital Day: 2  Admit Date: 10/7/2024  Referring Provider: Dr Castorena       Reasons for Consultation:  Goals of Care    HISTORY OF PRESENT ILLNESS (HPI):   Jose Fuentes is a 84 y.o. 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 Recent hospital stay last month w/ ESBL E coli bacteremia, hydronephrosis.     Multiple conversations held about Hospice as pt has discussed how he does not want ongoing treatment for his cancer. This includes conversations w/ Urology earely 9/2024, our team in detail 9/20/24, PCP on 10/4/24, Urology 10/8/24, hospitalist 10/8/24. Pt consistent he does not want treatment, wants to be home.    Psychosocial: Pt lives at home w/ wife Dayna and son Vane. Other children are son Sandi and daughter Jeanne. Dayna's sister Heaven involved, as is granddtr also named Dayna.       PALLIATIVE DIAGNOSES:    Debility  Shortness of breath   Fatigue  Stage IV prostate cancer  Palliative care encounter     ASSESSMENT AND PLAN:   Many conversations held regarding pt's care- recommendation for Hospice care given progressive cancer and failure to thrive.   Along w/ Veronica Bess LCSW meet w/ pt who states that \"I don't want treatment for my cancer. I want to be at home. I want to spend time with my family. I want to watch some TV.  I have said this.\" I think that this makes good sense.   Per family and notes has had some periods of confusion, decr cognition - but able to give input. He verbalizes he trusts his family to help w/ decisions.   Advance medical directive:   Pt does not have medical

## 2024-10-08 NOTE — PLAN OF CARE
Problem: Physical Therapy - Adult  Goal: By Discharge: Performs mobility at highest level of function for planned discharge setting.  See evaluation for individualized goals.  Description: FUNCTIONAL STATUS PRIOR TO ADMISSION: Patient had been ambulatory with a RW at home and receiving assistance from wife and son for ADLs and IADLs.  Per son patient has had a major decline in mobility and has been unable to stand or ambulate at home.    HOME SUPPORT PRIOR TO ADMISSION: The patient lived with wife and son and other son lives across the street.    Physical Therapy Goals  Initiated 10/8/2024  1.  Patient will move from supine to sit and sit to supine in bed with minimal assistance within 7 day(s).    2.  Patient will perform sit to stand with minimal assistance within 7 day(s).  3.  Patient will transfer from bed to chair and chair to bed with minimal assistance using the least restrictive device within 7 day(s).  4.  Patient will ambulate with minimal assistance for 15 feet with the least restrictive device within 7 day(s).     Outcome: Progressing   PHYSICAL THERAPY EVALUATION    Patient: Jose Fuentes (84 y.o. male)  Date: 10/8/2024  Primary Diagnosis: Hypoxia [R09.02]  Volume overload [E87.70]  Hypotension, unspecified hypotension type [I95.9]  Hypervolemia, unspecified hypervolemia type [E87.70]  Acute congestive heart failure, unspecified heart failure type (HCC) [I50.9]       Precautions: Restrictions/Precautions: Fall Risk                      ASSESSMENT :   DEFICITS/IMPAIRMENTS:   The patient is limited by impaired balance, gait instability, mild confusion, generalized weakness and decreased activity tolerance.  Currently patient requires modA for bed mobility and maxA to scoot to the EOB.  Sit to stand with modA and amb approx 3 feet with RW from bed to chair with maxA.  He has difficulty with turning feet and managing RW.  Suggest Stedy for in/out of bed mobility.  He is significantly below his

## 2024-10-08 NOTE — PROGRESS NOTES
Orders received, chart reviewed and patient evaluated by occupational therapy. Pending progression with skilled acute occupational therapy, recommend:    Continue to assess pending progress    Recommend with nursing patient to complete as able in order to maintain strength, endurance and independence: OOB to chair 3x/day, ADLs with 1 assist using RW and performing toileting with 1 assist on BSC. Thank you for your assistance.     Full evaluation to follow.

## 2024-10-08 NOTE — DISCHARGE INSTR - DIET
Heart Failure Nutrition Therapy (2023)     This nutrition therapy for heart failure focuses on limiting sodium in your diet to manage your heart failure symptoms and maintaining your body weight.   You may need to limit fluid in your diet. Your registered dietitian nutritionist (RDN) will provide you with the Fluid Restriction Nutrition Therapy handout if you need to limit your fluid intake.  Sodium  Salt (sodium) makes your body hold water. You may feel shortness of breath and experience swelling when your body is holding water. You can help to prevent these symptoms by eating less salt. You may need to limit the sodium you get from food and drinks to less than 2,300 milligrams per day.  Read the nutrition label to find out how much sodium is in 1 serving of a food. Select foods with 140 milligrams of sodium or less per serving.  Foods with more than 300 milligrams of sodium per serving may not fit into a reduced-sodium meal plan depending on your other food selections. Your RDN can help you determine which foods fit into your eating plan.    Check serving sizes. If you eat more than 1 serving, you will get more sodium than the amount listed.  Tips for Limiting Sodium in Your Diet  How to limit sodium Strategies   Avoid processed foods    Choose fresh and frozen fruits and vegetables without added juices or sauces. These foods are naturally low in sodium.     Choose fresh meats. These foods are lower in sodium than processed meats, such as gonzales, sausage, and hot dogs. Read the nutrition label to help you find fresh meat that is low in sodium.   Use less salt at the table and when cooking Leave the salt out of recipes for pasta, casseroles, and soups. Just 1 teaspoon of table salt has 2,300 milligrams of sodium.  Ask your RDN how to cook your favorite recipes without sodium.   Understand  claims about sodium Choose food packages that include the following claims about sodium:    “Salt-free” or  “sodium-free”: These foods contain less than 5 milligrams of sodium per serving.  “Very-low-sodium”: These foods contain less than 35 milligrams of sodium per serving.  “Low-sodium”: These foods contain less than 140 milligrams of sodium per serving.  Use caution with foods labeled “unsalted” or “no added salt.” These foods may still be high in sodium.   Add flavors to your food without adding sodium Use lemon juice, lime juice, fruit juice, or vinegar.  Add flavor with dry or fresh herbs such as basil, bay leaf, dill, rosemary, parsley, jai, dry mustard, nutmeg, thyme, and paprika.  Add spice using black pepper, red pepper flakes, and cayenne pepper.  Hot sauce contains sodium but using just a drop or two will not add much sodium to your foods.  Use a homemade or store-bought sodium-free seasoning blend.   Choose foods carefully when you eat outside your home Seek out nutrition information. Restaurant foods can be very high in sodium. Many restaurants provide nutrition facts on their menus or websites or upon request.  Let your  know that you want your food to be cooked without salt.  Ask for your salad dressing and sauces to come on the side.   Guidance for Weight Monitoring  Your RDN can help you determine your recommended weight.  Weigh yourself each day in the morning. Unexpected weight gain in a short period of time may be a sign that fluid is building up in your body.  Contact your health care provider if you gain 3 or more pounds in 1-2 days or 5 or more pounds within 1 week. Your health care provider may adjust your medicine to help manage fluid buildup.  The following tips may help if you can’t eat enough, have lost weight, or need extra calories and protein in your diet:  Plan to eat 3 meals and 3 snacks daily. Several small meals and snacks are often better tolerated and digested than large meals.  Keep high-calorie and high-protein snacks available for when you get hungry.  Add calories and  Unable to assess

## 2024-10-08 NOTE — PROGRESS NOTES
Palliative Medicine      Code Status: Full Code    Advance Care Planning:    Primary Decision Maker: Dayna Fuentes - Spouse    Pt does not have AMD on file.  In absence of verified Medical POA, wife Dayna Fuentes is legal NOK and surrogate decision maker if pt is unable to speak for himself.  Wife has strong support from 2 sons and 1 dtr as well as other family members as they work to determine a plan moving forward.     Patient / Family Encounter Documentation    Participants (names): Pt, wife Dayna, wife's sister Lisa, granddtr Dayna, Palliative Medicine (Dr. Bunn, [facility])    Narrative: Met first with family outside of room before regrouping at pt's bedside.  Family reports pt's sons are expected in this evening but wife and her sister wished to begin discussion now re: next steps, expressed belief that further treatment is not a viable option.  Family is interested in discussing hospice but are concerned about ability to meet pt's care needs.  One son lives in the home with pt and wife but works during the day.  Wife will need to be away from the home for several hours 3 times a week for dialysis.  Pt himself expressed desire to live as normal a life as he can at home with his family, watching tv when he feels like it.    Psychosocial Issues Identified/ Resilience Factors: Coping with advanced disease, wife has medical issues of her own.  No spiritual concerns identified.  Pt is noted to be Evangelical; Spiritual Care team is following for support.     Caregiver Adams: Pt was noted to be independent prior to hospitalization but condition has since declined; family is questioning their ability to care for pt at home.   Does the caregiver feel confident administering medication? No  Does the caregiver need any help connecting with community resources? Hospice, caregivers  Does the caregiver feel confident assisting with activities of daily living? No    Goals of Care / Plan: [facility] has been contacted and

## 2024-10-08 NOTE — NURSE NAVIGATOR
Consult to Heart Failure Nurse Navigator noted.  Consult was discontinued as patient's primary diagnosis not HF.

## 2024-10-08 NOTE — CONSULTS
Requesting Provider: Odalys Castorena MD              Patient: Jose Fuentes MRN: [id number]  SSN: [id number]    YOB: 1940  Age: 84 y.o.  Sex: male     Location: [facility]/       Code Status: Full Code   PCP: Tez Srinivasan DO  - [phone]   Emergency Contact:  Primary Emergency Contact: Shailesh Fuentes, Home Phone: [phone]   Race/Confucianism/Language: Other / Latter-day / Speaks English   Payor: Payor: HU[employer] MEDICARE / Plan: HU[employer] GOLD PLUS HMO / Product Type: *No Product type* /    Prior Admission Data: 9/26/24 [facility] 5E2 SURGICAL UNIT Nelsy Jaramillo   Hospitalized:  Hospital Day: 2 - Admitted 10/7/2024 10:46 AM     CONSULTANTS  IP CONSULT TO HEART FAILURE NURSE/COORDINATOR  IP CONSULT TO DIETITIAN  IP CONSULT TO CARDIOLOGY  IP CONSULT TO PALLIATIVE CARE  IP CONSULT TO UROLOGY   ADMISSION DIAGNOSES  [unfilled]      Assessment/Plan:       83 yo M with hx of 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 No UTI symptoms, expect colonization.   - Discussed overall decline and deconditioning with failure to thrive and repeat hospitalizations over the last few months. He is interested in continuing care with home health care, however, he reiterates that he is not interested in further treatment of his prostate cancer. I have recommended palliative care or hospice. He is not interesting in thinking about this now and is eager to go home. He will continue Nubeqa and follow up. Please call if we can be of further assistance.       CC: @JUAN [email or web address]@   HPI: He is a 84 y.o. male with hx of castrate resistant metastatic prostate cancer, partial neph and has atrophic right kidney. Has been followed by Dr. BARBIE Rangel. On Lupron (next dose due March) and started Nubeqa 05/17/2023. In August he was found to  warm, dry    (9) Neuro:  no focal deficits, Alert      Signed By: MADELAINE Banks - NP  - October 8, 2024

## 2024-10-08 NOTE — PROGRESS NOTES
Hospitalist Progress Note  Odalys Castorena MD  Answering service: [phone] OR [phone] from in house phone        Date of Service:  10/8/2024  NAME:  Jose Fuentes  :  1940  MRN:  [id number]      Admission Summary:   HPI: \"Jose Fuentes is a 84 y.o. 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     Multiple family members at bedside. Pt has been confused since last night.  nurse reported hypoxia 80s, hypotension with SBP 80s. Reported SOB worse with exertion. Appetite and po fluid/food intake decreased, weakness increased, now unable to walk, intermittent nausea/vomiting, unintentional weight loss.       Pt denies pain, SOB, falls, injuries, LOC, f/c, diarrhea, constipation.      Pt is s/p [facility] hospitalization -2024 with severe sepsis, ESBL E coli bacteremia, E coli UTI, bilateral hydronephrosis and L hydroureter s/p L nephrostomy, elevated troponin due to demand ischemia.\"       Interval history / Subjective:   Patient seen examined at bedside earlier, stable on 3 L nasal cannula, patient's son was present at bedside     Assessment & Plan:     Failure to thrive  -Patient has known metastatic prostate cancer has refused treatment, urology has evaluated he has been having progressive decline and deconditioning which is expected given his poor prognosis.  He did discuss extensively with the sons at bedside that he is at high risk for frequent rehospitalization's and this would not be a good quality of life moving forward.  Recommended palliative care discussion which involves both patient and his son as POA youngest son Shailesh is available to meet today after  10/08/24  0658    142   K 4.5 3.8   * 113*   CO2 24 23   BUN 20 19   MG  --  1.9   PHOS  --  2.7     Recent Labs     10/07/24  1054   ALT 8*   GLOB 4.2*     No results for input(s): \"INR\", \"APTT\" in the last 72 hours.    Invalid input(s): \"PTP\"   Recent Labs     10/07/24  1054   TIBC 214*      No results found for: \"RBCF\"   No results for input(s): \"PH\", \"PCO2\", \"PO2\" in the last 72 hours.  No results for input(s): \"CPK\" in the last 72 hours.    Invalid input(s): \"CPKMB\", \"CKNDX\", \"TROIQ\"  Lab Results   Component Value Date/Time    CHOL 143 10/08/2024 06:58 AM    HDL 37 10/08/2024 06:58 AM    LDL 74 10/08/2024 06:58 AM     No results found for: \"GLUCPOC\"        Medications Reviewed:     Current Facility-Administered Medications   Medication Dose Route Frequency    furosemide (LASIX) injection 20 mg  20 mg IntraVENous Once    sodium chloride flush 0.9 % injection 5-40 mL  5-40 mL IntraVENous 2 times per day    sodium chloride flush 0.9 % injection 5-40 mL  5-40 mL IntraVENous PRN    0.9 % sodium chloride infusion   IntraVENous PRN    ondansetron (ZOFRAN-ODT) disintegrating tablet 4 mg  4 mg Oral Q8H PRN    Or    ondansetron (ZOFRAN) injection 4 mg  4 mg IntraVENous Q6H PRN    polyethylene glycol (GLYCOLAX) packet 17 g  17 g Oral Daily PRN    acetaminophen (TYLENOL) tablet 650 mg  650 mg Oral Q6H PRN    melatonin tablet 3 mg  3 mg Oral Nightly PRN    cefTRIAXone (ROCEPHIN) 1,000 mg in sterile water 10 mL IV syringe  1,000 mg IntraVENous Q24H    atorvastatin (LIPITOR) tablet 40 mg  40 mg Oral Nightly    pantoprazole (PROTONIX) tablet 40 mg  40 mg Oral QAM AC     ______________________________________________________________________  EXPECTED LENGTH OF STAY: Unable to retrieve estimated LOS  ACTUAL LENGTH OF STAY:          1                 Odalys Castorena MD

## 2024-10-08 NOTE — CONSULTS
Nutrition Note:   Acknowledge Consult for Education: heart failure diet education.    Initial education to be completed by Heart Failure Nurse Navigator. Will defer RD intervention at this time, low sodium diet information attached to AVS.  Navigator/provider will reconsult RD if additional diet education intervention needed.    Thank you.    Jing Cason RD

## 2024-10-08 NOTE — PLAN OF CARE
Problem: Occupational Therapy - Adult  Goal: By Discharge: Performs self-care activities at highest level of function for planned discharge setting.  See evaluation for individualized goals.  Description: FUNCTIONAL STATUS PRIOR TO ADMISSION:  Patient living at home with wife (son recently moved in with patient as well). Patient using RW for functional mobility and reporting being mostly mod I with ADLs. Of note, son reporting patient has had a significant decline in function over last month and has been increasingly spending more time in bed then up all night.     Occupational Therapy Goals:  Initiated 10/8/2024  1.  Patient will perform grooming in standing with Contact Guard Assist within 7 day(s).  2.  Patient will perform bathing using most appropriate DME with Contact Guard Assist within 7 day(s).  3.  Patient will perform lower body dressing with Contact Guard Assist within 7 day(s).  4.  Patient will perform toilet transfers to/from [facility] with Contact Guard Assist  within 7 day(s).  5.  Patient will perform all aspects of toileting with Contact Guard Assist within 7 day(s).  6.  Patient will participate in upper extremity therapeutic exercise/activities with Supervision for 5 minutes within 7 day(s).    7.  Patient will utilize energy conservation techniques during functional activities with verbal cues within 7 day(s).    Outcome: Progressing    OCCUPATIONAL THERAPY EVALUATION    Patient: Jose Fuentes (84 y.o. male)  Date: 10/8/2024  Primary Diagnosis: Hypoxia [R09.02]  Volume overload [E87.70]  Hypotension, unspecified hypotension type [I95.9]  Hypervolemia, unspecified hypervolemia type [E87.70]  Acute congestive heart failure, unspecified heart failure type (HCC) [I50.9]         Precautions: Fall Risk                  ASSESSMENT :  The patient is limited by decreased functional mobility, independence in ADLs, strength, body mechanics, activity tolerance, endurance, safety awareness, cognition,

## 2024-10-08 NOTE — CARE COORDINATION
10/08/24 1418   Readmission Assessment   Number of Days since last admission? 8-30 days   Previous Disposition Home with Home Health   Who is being Interviewed Caregiver   What was the patient's/caregiver's perception as to why they think they needed to return back to the hospital? Other (Comment)  (Medical condition became worse.)   Did you visit your Primary Care Physician after you left the hospital, before you returned this time? Yes   Did you see a specialist, such as Cardiac, Pulmonary, Orthopedic Physician, etc. after you left the hospital? No   Who advised the patient to return to the hospital? Home Health Staff   Does the patient report anything that got in the way of taking their medications? No   In our efforts to provide the best possible care to you and others like you, can you think of anything that we could have done to help you after you left the hospital the first time, so that you might not have needed to return so soon? Other (Comment)  (Medical condition became worse.)

## 2024-10-08 NOTE — CARE COORDINATION
Care Management Initial Assessment       RUR:  24% High Risk  Readmission? Yes - 10/7/2024  1st IM letter given? Yes - 10/8/2024  1st  letter given: No       10/08/24 1423   Service Assessment   Patient Orientation Person   Cognition Short Term Memory Deficit   History Provided By Child/Family;Significant Other   Primary Caregiver Self   Support Systems Spouse/Significant Other;Children;Family Members   Patient's Healthcare Decision Maker is: Legal Next of Kin   PCP Verified by CM Yes   Last Visit to PCP Within last 3 months   Prior Functional Level Assistance with the following:;Bathing;Dressing;Toileting;Cooking;Housework;Shopping;Mobility;Feeding   Current Functional Level Assistance with the following:;Bathing;Dressing;Toileting;Feeding;Cooking;Housework;Shopping;Mobility   Can patient return to prior living arrangement Unknown at present   Ability to make needs known: Good   Family able to assist with home care needs: Yes   Would you like for me to discuss the discharge plan with any other family members/significant others, and if so, who? Yes   Financial Resources Medicare   Social/Functional History   Lives With Spouse;Son   Type of Home House   Home Layout Two level;Able to Live on Main level with bedroom/bathroom   Home Access Stairs to enter with rails   Entrance Stairs - Number of Steps 3   Entrance Stairs - Rails Both   Bathroom Equipment Grab bars in shower;Shower chair   Bathroom Accessibility Wheelchair accessible   Home Equipment Walker - Rolling;Cane   Receives Help From Family   ADL Assistance Needs assistance   Bath Dependent/Total   Dressing Dependent/Total   Grooming Dependent/Total   Feeding Dependent/Total   Toileting Needs assistance   Homemaking Assistance Needs assistance   Meal Prep Total   Laundry Total   Vacuuming Total   Cleaning Total   Gardening Total   Yard Work Total   Driving Total   Shopping Total    Total   Other (Comment) Total   Homemaking Responsibilities No

## 2024-10-08 NOTE — PROGRESS NOTES
NUTRITION     Nutrition screening referral was triggered based on results obtained during nursing admission assessment for unsure weight loss.    The patient's chart was reviewed and nutrition assessment is not indicated at this time.  Plan to see patient for rescreen as indicated.  Thank you.     Wt Readings from Last 10 Encounters:   10/08/24 70.4 kg (155 lb 3.3 oz)   09/23/24 75.3 kg (165 lb 14.4 oz)   09/04/24 68.3 kg (150 lb 9.2 oz)   08/15/24 69.6 kg (153 lb 6.4 oz)   08/13/24 75.6 kg (166 lb 10.7 oz)   07/05/24 75.3 kg (166 lb)   06/15/24 77.6 kg (171 lb 1.2 oz)   07/31/23 70.8 kg (156 lb)   07/20/23 74.4 kg (164 lb)   07/18/23 73.9 kg (163 lb)       Jing Cason RD  Available via [place] [facility]

## 2024-10-08 NOTE — ACP (ADVANCE CARE PLANNING)
Advance Care Planning     General Advance Care Planning (ACP) Conversation    Date of Conversation: 10/7/2024  Conducted with: Patient with Decision Making Capacity, Healthcare Decision Maker, and Legal next of kin  Other persons present: Spouse, Sister, Daughter, Son    Healthcare Decision Maker:   Primary Decision Maker: AlfredoShailesh - Child - [phone]     Today we discussed code status, started conversation about GOC, talked about Palliative Medicine involvment  Reviewed DNR/DNI and patient elects Full Code (Attempt Resuscitation)    Length of Voluntary ACP Conversation in minutes:  20 minutes    Trixie Roach MD

## 2024-10-09 LAB
ANION GAP SERPL CALC-SCNC: 8 MMOL/L (ref 2–12)
BASOPHILS # BLD: 0.1 K/UL (ref 0–0.1)
BASOPHILS NFR BLD: 1 % (ref 0–1)
BUN SERPL-MCNC: 19 MG/DL (ref 6–20)
BUN/CREAT SERPL: 16 (ref 12–20)
CALCIUM SERPL-MCNC: 10 MG/DL (ref 8.5–10.1)
CHLORIDE SERPL-SCNC: 114 MMOL/L (ref 97–108)
CO2 SERPL-SCNC: 22 MMOL/L (ref 21–32)
CREAT SERPL-MCNC: 1.18 MG/DL (ref 0.7–1.3)
DIFFERENTIAL METHOD BLD: ABNORMAL
EOSINOPHIL # BLD: 0.7 K/UL (ref 0–0.4)
EOSINOPHIL NFR BLD: 9 % (ref 0–7)
ERYTHROCYTE [DISTWIDTH] IN BLOOD BY AUTOMATED COUNT: 15.4 % (ref 11.5–14.5)
GLUCOSE SERPL-MCNC: 82 MG/DL (ref 65–100)
HCT VFR BLD AUTO: 29.2 % (ref 36.6–50.3)
HGB BLD-MCNC: 9.4 G/DL (ref 12.1–17)
IMM GRANULOCYTES # BLD AUTO: 0 K/UL (ref 0–0.04)
IMM GRANULOCYTES NFR BLD AUTO: 0 % (ref 0–0.5)
LYMPHOCYTES # BLD: 1.9 K/UL (ref 0.8–3.5)
LYMPHOCYTES NFR BLD: 28 % (ref 12–49)
MAGNESIUM SERPL-MCNC: 1.9 MG/DL (ref 1.6–2.4)
MCH RBC QN AUTO: 31.8 PG (ref 26–34)
MCHC RBC AUTO-ENTMCNC: 32.2 G/DL (ref 30–36.5)
MCV RBC AUTO: 98.6 FL (ref 80–99)
MONOCYTES # BLD: 0.4 K/UL (ref 0–1)
MONOCYTES NFR BLD: 6 % (ref 5–13)
NEUTS SEG # BLD: 3.9 K/UL (ref 1.8–8)
NEUTS SEG NFR BLD: 56 % (ref 32–75)
NRBC # BLD: 0 K/UL (ref 0–0.01)
NRBC BLD-RTO: 0 PER 100 WBC
PHOSPHATE SERPL-MCNC: 2.8 MG/DL (ref 2.6–4.7)
PLATELET # BLD AUTO: 175 K/UL (ref 150–400)
PMV BLD AUTO: 10.5 FL (ref 8.9–12.9)
POTASSIUM SERPL-SCNC: 3.9 MMOL/L (ref 3.5–5.1)
RBC # BLD AUTO: 2.96 M/UL (ref 4.1–5.7)
SODIUM SERPL-SCNC: 144 MMOL/L (ref 136–145)
WBC # BLD AUTO: 7 K/UL (ref 4.1–11.1)

## 2024-10-09 PROCEDURE — 85025 COMPLETE CBC W/AUTO DIFF WBC: CPT

## 2024-10-09 PROCEDURE — 2700000000 HC OXYGEN THERAPY PER DAY

## 2024-10-09 PROCEDURE — 94760 N-INVAS EAR/PLS OXIMETRY 1: CPT

## 2024-10-09 PROCEDURE — 83735 ASSAY OF MAGNESIUM: CPT

## 2024-10-09 PROCEDURE — 84100 ASSAY OF PHOSPHORUS: CPT

## 2024-10-09 PROCEDURE — 2580000003 HC RX 258: Performed by: INTERNAL MEDICINE

## 2024-10-09 PROCEDURE — 6370000000 HC RX 637 (ALT 250 FOR IP): Performed by: INTERNAL MEDICINE

## 2024-10-09 PROCEDURE — 1110000000 HC RM PRIVATE GYN

## 2024-10-09 PROCEDURE — 36415 COLL VENOUS BLD VENIPUNCTURE: CPT

## 2024-10-09 PROCEDURE — 80048 BASIC METABOLIC PNL TOTAL CA: CPT

## 2024-10-09 RX ADMIN — PANTOPRAZOLE SODIUM 40 MG: 40 TABLET, DELAYED RELEASE ORAL at 05:56

## 2024-10-09 RX ADMIN — SODIUM CHLORIDE, PRESERVATIVE FREE 5 ML: 5 INJECTION INTRAVENOUS at 21:46

## 2024-10-09 RX ADMIN — ATORVASTATIN CALCIUM 40 MG: 40 TABLET, FILM COATED ORAL at 21:45

## 2024-10-09 RX ADMIN — SODIUM CHLORIDE, PRESERVATIVE FREE 5 ML: 5 INJECTION INTRAVENOUS at 21:45

## 2024-10-09 NOTE — PLAN OF CARE
Problem: Discharge Planning  Goal: Discharge to home or other facility with appropriate resources  10/9/2024 0044 by Fiorella Valera, RN  Outcome: Progressing  Flowsheets (Taken 10/9/2024 0044)  Discharge to home or other facility with appropriate resources:   Identify barriers to discharge with patient and caregiver   Identify discharge learning needs (meds, wound care, etc)   Arrange for interpreters to assist at discharge as needed   Arrange for needed discharge resources and transportation as appropriate  10/8/2024 1151 by Mary Hawk, RN  Outcome: Progressing     Problem: Safety - Adult  Goal: Free from fall injury  10/9/2024 0044 by Fiorella Valera, RN  Outcome: Progressing  Flowsheets (Taken 10/9/2024 0044)  Free From Fall Injury:   Instruct family/caregiver on patient safety   Based on caregiver fall risk screen, instruct family/caregiver to ask for assistance with transferring infant if caregiver noted to have fall risk factors  10/8/2024 1151 by Mary Hawk, RN  Outcome: Progressing

## 2024-10-09 NOTE — PROGRESS NOTES
Hospitalist Progress Note  Odalys Castorena MD  Answering service: [phone] OR [phone] from in house phone        Date of Service:  10/9/2024  NAME:  Jose Fuentes  :  1940  MRN:  [id number]      Admission Summary:   HPI: \"Jose Fuentes is a 84 y.o. 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     Multiple family members at bedside. Pt has been confused since last night.  nurse reported hypoxia 80s, hypotension with SBP 80s. Reported SOB worse with exertion. Appetite and po fluid/food intake decreased, weakness increased, now unable to walk, intermittent nausea/vomiting, unintentional weight loss.       Pt denies pain, SOB, falls, injuries, LOC, f/c, diarrhea, constipation.      Pt is s/p [facility] hospitalization -2024 with severe sepsis, ESBL E coli bacteremia, E coli UTI, bilateral hydronephrosis and L hydroureter s/p L nephrostomy, elevated troponin due to demand ischemia.\"       Interval history / Subjective:   Patient seen examined at bedside earlier, stable on 3 L nasal cannula, patient's wife was present at bedside, feels well wishing to go home      Assessment & Plan:     Failure to thrive  -Patient has known metastatic prostate cancer has refused treatment, urology has evaluated he has been having progressive decline and deconditioning which is expected given his poor prognosis.  He did discuss extensively with the sons at bedside that he is at high risk for frequent rehospitalization's and this would not be a good quality of life moving forward.  Recommended palliative care discussion, completed 10/8 which involved  patient and his son as POA youngest  encounter    Debility  Resolved Problems:    * No resolved hospital problems. *            Review of Systems:   Pertinent items are noted in HPI.         Vital Signs:    Last 24hrs VS reviewed since prior progress note. Most recent are:  /71   Pulse 86   Temp 97.3 °F (36.3 °C) (Oral)   Resp 12   Ht 1.829 m (6')   Wt 70.6 kg (155 lb 10.3 oz)   SpO2 95%   BMI 21.11 kg/m²        Intake/Output Summary (Last 24 hours) at 10/9/2024 1353  Last data filed at 10/9/2024 1046  Gross per 24 hour   Intake 442 ml   Output 775 ml   Net -333 ml        Physical Examination:     I had a face to face encounter with this patient and independently examined them on 10/9/2024 as outlined below:          General : alert x 3, awake, no acute distress, chronically ill appearing   HEENT: PEERL, EOMI, moist mucus membrane  Neck: supple, no JVD, no meningeal signs  Chest: Clear to auscultation bilaterally   CVS: S1 S2 heard, Capillary refill less than 2 seconds  Abd: soft/ non tender, non distended, BS physiological,   Ext: no clubbing, no cyanosis, no edema, brisk 2+ DP pulses  Neuro/Psych: pleasant mood and affect, CN 2-12 grossly intact, sensory grossly within normal limit, Strength 5/5 in all extremities  Skin: warm            Data Review:    Review and/or order of clinical lab test  Review and/or order of tests in the radiology section of CPT  Review and/or order of tests in the medicine section of CPT    I have independently reviewed and interpreted patient's lab and all other diagnostic data    Notes reviewed from all clinical/nonclinical/nursing services involved in patient's clinical care. Care coordination discussions were held with appropriate clinical/nonclinical/ nursing providers based on care coordination needs.     Labs:     Recent Labs     10/08/24  0658 10/09/24  0553   WBC 7.2 7.0   HGB 9.6* 9.4*   HCT 29.8* 29.2*    175     Recent Labs     10/07/24  1054 10/08/24  0658 10/09/24  0553    142 144   K

## 2024-10-09 NOTE — PROGRESS NOTES
Spiritual Health History and Assessment/Progress Note  [facility]    Palliative Care,  ,  ,      Name: Jose Fuentes MRN: [id number]    Age: 84 y.o.     Sex: male   Language: English   Tenriism: Confucianist   Volume overload     Date: 10/9/2024            Total Time Calculated: 15 min              Spiritual Assessment began in [facility] 3N TELEMETRY        Referral/Consult From: Palliative Care   Encounter Overview/Reason: Palliative Care  Service Provided For: Patient not available    Lashaun, Belief, Meaning:   Patient unable to assess at this time  Family/Friends No family/friends present      Importance and Influence:  Patient unable to assess at this time  Family/Friends No family/friends present    Community:  Patient Other:    Family/Friends No family/friends present    Assessment and Plan of Care:     Patient Interventions include: Other:    Family/Friends Interventions include: No family/friends present    Patient Plan of Care:   Family/Friends Plan of Care: Spiritual Care available upon further referral  I attempted to visit Jose Fuentes for a palliative initial spiritual health assessment. The patient was sleeping and no family was present.   Psychosocial: Pt lives at home w/ wife Dayna and son Vane. Other children are son Sandi and daughter Jeanne. Dayna's sister Heaven involved, as is granddtr also named Dayna.     Electronically signed by SILAS GRADY on 10/9/2024 at 10:14 AM

## 2024-10-09 NOTE — PROGRESS NOTES
Physician Progress Note      PATIENT:               NUNO WALTERS  CSN #:                  [id number]  :                       1940  ADMIT DATE:       10/7/2024 10:46 AM  DISCH DATE:  RESPONDING  PROVIDER #:        Odalys Castorena MD          QUERY TEXT:    Patient admitted with Failure to thrive. Noted documentation of CHF. In order   to support the diagnosis of CHF, please include additional clinical indicators   in your documentation.  Or please document if the diagnosis of CHF has been   ruled out after further study.    The medical record reflects the following:  Risk Factors: CAD  volume overload    Clinical Indicators:  ED Working Diagnosis:  2. Acute congestive heart failure, unspecified heart failure type (HCC)    card consult 10/7  Elevated BNP/SOB: Probably low-grade heart failure symptoms.    pn 10/8  cancel tte not needed per cardiology, appreciate cardio recs no cardiac   etiology contributing to presentation, recent echo with normal EF    Pro BNP     No acute process on portable chest.    ECHO 24  Left?Ventricle: Normal left ventricular systolic function with a visually   estimated EF of 55 - 60%. Left ventricle size is normal. Normal wall   thickness. Normal wall motion.    Treatment:  card consult    Thank you,  Veronica Harris RN CDI CRCR  Clinical Documentation  [phone] or via [employer]  Options provided:  -- CHF was ruled out  -- CHF present as evidenced by, Please document evidence.  -- Other - I will add my own diagnosis  -- Disagree - Not applicable / Not valid  -- Disagree - Clinically unable to determine / Unknown  -- Refer to Clinical Documentation Reviewer    PROVIDER RESPONSE TEXT:    CHF was ruled out after study.    Query created by: Veronica Harris on 10/9/2024 10:22 AM      Electronically signed by:  Odalys Castorena MD 10/9/2024 12:16 PM

## 2024-10-10 VITALS
HEIGHT: 72 IN | TEMPERATURE: 97.9 F | OXYGEN SATURATION: 90 % | RESPIRATION RATE: 19 BRPM | HEART RATE: 91 BPM | DIASTOLIC BLOOD PRESSURE: 62 MMHG | WEIGHT: 150.4 LBS | SYSTOLIC BLOOD PRESSURE: 99 MMHG | BODY MASS INDEX: 20.37 KG/M2

## 2024-10-10 PROCEDURE — 6370000000 HC RX 637 (ALT 250 FOR IP): Performed by: INTERNAL MEDICINE

## 2024-10-10 PROCEDURE — 6370000000 HC RX 637 (ALT 250 FOR IP): Performed by: HOSPITALIST

## 2024-10-10 PROCEDURE — 94760 N-INVAS EAR/PLS OXIMETRY 1: CPT

## 2024-10-10 PROCEDURE — 2580000003 HC RX 258: Performed by: INTERNAL MEDICINE

## 2024-10-10 PROCEDURE — 2700000000 HC OXYGEN THERAPY PER DAY

## 2024-10-10 RX ORDER — ATORVASTATIN CALCIUM 40 MG/1
40 TABLET, FILM COATED ORAL DAILY
Qty: 30 TABLET | Refills: 0 | Status: SHIPPED | OUTPATIENT
Start: 2024-10-10 | End: 2024-11-09

## 2024-10-10 RX ORDER — MAG HYDROX/ALUMINUM HYD/SIMETH 400-400-40
320 SUSPENSION, ORAL (FINAL DOSE FORM) ORAL DAILY
Qty: 30 CAPSULE | Refills: 0 | Status: SHIPPED | OUTPATIENT
Start: 2024-10-10 | End: 2024-11-09

## 2024-10-10 RX ORDER — LACTOBACILLUS RHAMNOSUS GG 10B CELL
1 CAPSULE ORAL DAILY
Status: DISCONTINUED | OUTPATIENT
Start: 2024-10-10 | End: 2024-10-10 | Stop reason: HOSPADM

## 2024-10-10 RX ORDER — ASPIRIN 81 MG/1
81 TABLET ORAL DAILY
Status: DISCONTINUED | OUTPATIENT
Start: 2024-10-10 | End: 2024-10-10 | Stop reason: HOSPADM

## 2024-10-10 RX ORDER — SACCHAROMYCES BOULARDII 250 MG
500 CAPSULE ORAL DAILY
Qty: 14 CAPSULE | Refills: 0 | Status: SHIPPED | OUTPATIENT
Start: 2024-10-10 | End: 2024-10-17

## 2024-10-10 RX ORDER — ASPIRIN 81 MG/1
81 TABLET ORAL DAILY
Qty: 30 TABLET | Refills: 3 | Status: SHIPPED | OUTPATIENT
Start: 2024-10-10

## 2024-10-10 RX ORDER — MAG HYDROX/ALUMINUM HYD/SIMETH 400-400-40
320 SUSPENSION, ORAL (FINAL DOSE FORM) ORAL DAILY
Status: DISCONTINUED | OUTPATIENT
Start: 2024-10-10 | End: 2024-10-10 | Stop reason: CLARIF

## 2024-10-10 RX ADMIN — ASPIRIN 81 MG: 81 TABLET, COATED ORAL at 16:44

## 2024-10-10 RX ADMIN — SODIUM CHLORIDE, PRESERVATIVE FREE 10 ML: 5 INJECTION INTRAVENOUS at 11:31

## 2024-10-10 RX ADMIN — Medication 1 CAPSULE: at 16:44

## 2024-10-10 RX ADMIN — PANTOPRAZOLE SODIUM 40 MG: 40 TABLET, DELAYED RELEASE ORAL at 06:11

## 2024-10-10 ASSESSMENT — PAIN SCALES - GENERAL: PAINLEVEL_OUTOF10: 0

## 2024-10-10 NOTE — DISCHARGE SUMMARY
Discharge Summary       PATIENT ID: Jose Fuentes  MRN: [id number]   YOB: 1940    DATE OF ADMISSION: 10/7/2024 10:46 AM    DATE OF DISCHARGE: 10/10/24   PRIMARY CARE PROVIDER: Tez Srinivasan DO     ATTENDING PHYSICIAN: tamia hernandez  DISCHARGING PROVIDER: Tamia Hernandez MD    To contact this individual call [phone] and ask the  to page.  If unavailable ask to be transferred the Adult Hospitalist Department.    CONSULTATIONS: IP CONSULT TO DIETITIAN  IP CONSULT TO CARDIOLOGY  IP CONSULT TO PALLIATIVE CARE  IP CONSULT TO UROLOGY  IP CONSULT TO CASE MANAGEMENT    PROCEDURES/SURGERIES: * No surgery found *    ADMITTING DIAGNOSES & HOSPITAL COURSE:     Jose Fuentes is a 84 y.o. 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 Multiple family members at bedside. Pt has been confused since last night. HH nurse reported hypoxia 80s, hypotension with SBP 80s. Reported SOB worse with exertion. Appetite and po fluid/food intake decreased, weakness increased, now unable to walk, intermittent nausea/vomiting, unintentional weight loss.      Failure to thrive  -Patient has known metastatic prostate cancer has refused treatment, urology has evaluated he has been having progressive decline and deconditioning which is expected given his poor prognosis.  He did discuss extensively with the sons at bedside that he is at high risk for frequent rehospitalization's and this would not be a good quality of life moving forward.  Recommended palliative care discussion, completed 10/8 which involved  patient and his son as POA youngest son Shailesh they are agreeable to home hospice. Consulted placed     Volume overload with suspected IVVD  CAD s/p

## 2024-10-10 NOTE — CARE COORDINATION
CONSUELO:    RN discussing d/c instructions with family at bedside. They would like to speak with attending about medications. Dr. Hernandez can be at bedside around 3pm. Cm changed transport to 5pm.    [facility]  [facility] Department  Ph:[phone]

## 2024-10-10 NOTE — PLAN OF CARE
Problem: Discharge Planning  Goal: Discharge to home or other facility with appropriate resources  Outcome: Progressing  Flowsheets (Taken 10/9/2024 2000)  Discharge to home or other facility with appropriate resources:   Identify barriers to discharge with patient and caregiver   Arrange for needed discharge resources and transportation as appropriate   Identify discharge learning needs (meds, wound care, etc)   Refer to discharge planning if patient needs post-hospital services based on physician order or complex needs related to functional status, cognitive ability or social support system     Problem: Safety - Adult  Goal: Free from fall injury  Outcome: Progressing     Problem: Skin/Tissue Integrity  Goal: Absence of new skin breakdown  Description: 1.  Monitor for areas of redness and/or skin breakdown  2.  Assess vascular access sites hourly  3.  Every 4-6 hours minimum:  Change oxygen saturation probe site  4.  Every 4-6 hours:  If on nasal continuous positive airway pressure, respiratory therapy assess nares and determine need for appliance change or resting period.  Outcome: Progressing

## 2024-10-10 NOTE — PROGRESS NOTES
Herbal and Nutritional Product Restrictions      The following herbal, alternative, and/or nutritional/dietary supplement product(s) has been discontinued per [facility] approved policy:    Saw [place]    Please reorder upon discharge if appropriate.    Thank you,  ISMAEL DEVINE RP MS  10/10/2024 2:06 PM

## 2024-10-10 NOTE — CARE COORDINATION
Transition of Care: home with home hospice with [facility]; admission by hospice is at 4:30pm today 10/10/24    Contact at [facility] is Peri- [phone]        Transport Plan: CHANGE- pts transport delayed until 5pm with [facility]- stretcher [facility]- scheduled for 2pm to go home with hospice; son is in agreement    RUR: 24%    Main contact is son- Shailesh Fuentes- [phone]    1000: this CM called [facility] to confirm their admission today; no answer; left VM for callback    [id number]: [facility] they will [facility] today at 1630; this CM called pts son to update on discharge today with [facility] at 2pm with [facility] stret[facility]; he verbalized understanding; this CM uploaded the dc summary and the facesheet for [facility] to [facility]      1515: this CM was informed by other unit CM that pts family needs to talk to the attending prior to discharge; this is delaying the discharge-  stretcher transport has been changed to  5pm pickup; this CM called [facility] at [facility] to inform of delay- no answer; left VM with delay for callback     Prior Level of Functioning: lives in a home with spouse and son; is dependent in adls  Disposition: home with home hospice- [facility]  If SNF or IPR: Date FOC offered: n/a  Date FOC received: n/a  Accepting facility: n/a  Date authorization started with reference number: n/a  Date authorization received and expires: n/a  Follow up appointments: home hospice  DME needed: none ordered  Transportation at discharge: stretcher - [facility]  IM/IMM Medicare/ letter given: 1st on 10/8/24  Is patient a [place] and connected with [facility]? no   If yes, was  transfer form completed and VA notified? N/a  Caregiver Contact: son  Discharge Caregiver contacted prior to discharge? Yes- by phone on 10/10  Care Conference needed? No; palliative is following  Barriers to discharge:  none    CM following   Laura Cartagena RN

## 2024-10-10 NOTE — DISCHARGE INSTRUCTIONS
Discharge Instructions       PATIENT ID: Jose Fuentes  MRN: [id number]   YOB: 1940    DATE OF ADMISSION: 10/7/2024   DATE OF DISCHARGE: 10/10/2024    PRIMARY CARE PROVIDER: Tez Srinivasan     ATTENDING PHYSICIAN: Tamia Hernandez MD   DISCHARGING PROVIDER: Tamia Hernandez MD    To contact this individual call [phone] and ask the  to page.   If unavailable ask to be transferred the Adult Hospitalist Department.    DISCHARGE DIAGNOSES Bladder ca FTT     CONSULTATIONS: [unfilled]    PROCEDURES/SURGERIES: * No surgery found *    PENDING TEST RESULTS:   At the time of discharge the following test results are still pending:     [facility]   [facility]     ADDITIONAL CARE RECOMMENDATIONS:     DIET: regular diet      ACTIVITY: activity as tolerated    WOUND CARE:     EQUIPMENT needed:       DISCHARGE MEDICATIONS:   See Medication Reconciliation Form    It is important that you take the medication exactly as they are prescribed.   Keep your medication in the bottles provided by the pharmacist and keep a list of the medication names, dosages, and times to be taken in your wallet.   Do not take other medications without consulting your doctor.       NOTIFY YOUR PHYSICIAN FOR ANY OF THE FOLLOWING:   Fever over 101 degrees for 24 hours.   Chest pain, shortness of breath, fever, chills, nausea, vomiting, diarrhea, change in mentation, falling, weakness, bleeding. Severe pain or pain not relieved by medications.  Or, any other signs or symptoms that you may have questions about.      DISPOSITION:   x Home With:   OT  PT  HH  RN       SNF/Inpatient Rehab/LTAC    Independent/assisted living   x Hospice    Other:     CDMP Checked:   Yes x     PROBLEM LIST Updated:  Yes x       Signed:   Tamia Hernandez MD  10/10/2024  10:55 AM

## 2024-10-10 NOTE — DISCHARGE SUMMARY
1640: GÓMEZ Dunlap went over DC paperwork with son and pt (son was called on the phone). They verbalized understanding of DC instructions and RN answered all of their questions.   1645: RN took out pt's L arm midline. Tech took out pt's R arm peripheral IV earlier this afternoon.  1800: [facility] here to take pt home. GÓMEZ Dunlap gave SBAR report to transporter and answered all of her questions.   1810: pt leaving with transport. Pt stable, A&O x4, and on 2L NC when leaving.

## 2024-10-10 NOTE — PALLIATIVE CARE DISCHARGE
Goals of Care/Treatment Preferences    The Palliative Medicine team was consulted as part of your/your loved one's care in the hospital. Our team is a supportive service; we strive to relieve suffering and improve quality of life.    We reviewed advance care planning information, which includes the following:    Primary Decision Maker: Dayna Fuentes - Spouse  Patient's Healthcare Decision Maker is:: Legal Next of Kin    Patient/Health Care Proxy Stated Goals: Return home with support from [facility], spend time with family and engage in activities that bring you meenakshi    We reviewed / discussed your code status as:   Code Status: Full Code     “Full Code” means perform CPR in the event of cardiac arrest.      “DNR” means do NOT perform CPR in the event of cardiac arrest.      “Partial Code” means you have specific preferences, please discuss with your healthcare team.      “No Order” means this issue was not addressed / resolved during your stay            Because of the importance of this information, we are providing you with a printed copy to share with other healthcare providers after this hospitalization is complete.

## 2024-10-20 PROBLEM — E86.0 DEHYDRATION: Status: RESOLVED | Noted: 2024-09-20 | Resolved: 2024-10-20

## 2024-10-25 ENCOUNTER — TELEPHONE (OUTPATIENT)
Age: 84
End: 2024-10-25

## 2024-10-25 NOTE — TELEPHONE ENCOUNTER
[facility] hospice calls today stating patient has  today at 1541. Due to patients Yazdanism they need Dr Srinivasan to sign Death certificate today. She has been aware Dr Srinivasan is willing to sign however they were able to contact the hospice doctor Dr Dunne and he will be signing death certificate    : Dennise  Office # if any questions: [phone]

## (undated) DEVICE — RETRIEVAL DEVICE: Brand: RESCUENET™

## (undated) DEVICE — SNARE ENDOSCP L240CM LOOP W27MM SHTH DIA2.4MM WRK CHN 2.8MM

## (undated) DEVICE — TUBING IRRIG L10IN DISP PMP ENDOGATOR E

## (undated) DEVICE — TRAP SURG QUAD PARABOLA SLOT DSGN SFTY SCRN TRAPEASE